# Patient Record
Sex: FEMALE | Race: WHITE | NOT HISPANIC OR LATINO | URBAN - METROPOLITAN AREA
[De-identification: names, ages, dates, MRNs, and addresses within clinical notes are randomized per-mention and may not be internally consistent; named-entity substitution may affect disease eponyms.]

---

## 2017-03-27 ENCOUNTER — OUTPATIENT (OUTPATIENT)
Dept: OUTPATIENT SERVICES | Facility: HOSPITAL | Age: 82
LOS: 1 days | End: 2017-03-27
Payer: MEDICARE

## 2017-03-27 ENCOUNTER — APPOINTMENT (OUTPATIENT)
Dept: CT IMAGING | Facility: CLINIC | Age: 82
End: 2017-03-27

## 2017-03-27 DIAGNOSIS — Z00.8 ENCOUNTER FOR OTHER GENERAL EXAMINATION: ICD-10-CM

## 2017-03-27 PROCEDURE — 71250 CT THORAX DX C-: CPT

## 2018-05-21 ENCOUNTER — EMERGENCY (EMERGENCY)
Facility: HOSPITAL | Age: 83
LOS: 1 days | Discharge: HOME | End: 2018-05-21
Attending: EMERGENCY MEDICINE
Payer: MEDICARE

## 2018-05-21 VITALS
RESPIRATION RATE: 17 BRPM | TEMPERATURE: 98 F | DIASTOLIC BLOOD PRESSURE: 68 MMHG | SYSTOLIC BLOOD PRESSURE: 120 MMHG | HEART RATE: 92 BPM | OXYGEN SATURATION: 95 %

## 2018-05-21 VITALS
WEIGHT: 95.02 LBS | TEMPERATURE: 98 F | OXYGEN SATURATION: 94 % | SYSTOLIC BLOOD PRESSURE: 114 MMHG | DIASTOLIC BLOOD PRESSURE: 70 MMHG | HEART RATE: 98 BPM | HEIGHT: 59 IN | RESPIRATION RATE: 20 BRPM

## 2018-05-21 LAB
ALBUMIN SERPL ELPH-MCNC: 3.5 G/DL — SIGNIFICANT CHANGE UP (ref 3.3–5)
ALP SERPL-CCNC: 94 U/L — SIGNIFICANT CHANGE UP (ref 40–120)
ALT FLD-CCNC: 9 U/L — LOW (ref 10–45)
ANION GAP SERPL CALC-SCNC: 11 MMOL/L — SIGNIFICANT CHANGE UP (ref 5–17)
AST SERPL-CCNC: 12 U/L — SIGNIFICANT CHANGE UP (ref 10–40)
BASOPHILS # BLD AUTO: 0 K/UL — SIGNIFICANT CHANGE UP (ref 0–0.2)
BASOPHILS NFR BLD AUTO: 0.3 % — SIGNIFICANT CHANGE UP (ref 0–2)
BILIRUB SERPL-MCNC: 0.3 MG/DL — SIGNIFICANT CHANGE UP (ref 0.2–1.2)
BUN SERPL-MCNC: 18 MG/DL — SIGNIFICANT CHANGE UP (ref 7–23)
CALCIUM SERPL-MCNC: 9.5 MG/DL — SIGNIFICANT CHANGE UP (ref 8.4–10.5)
CHLORIDE SERPL-SCNC: 100 MMOL/L — SIGNIFICANT CHANGE UP (ref 96–108)
CO2 SERPL-SCNC: 27 MMOL/L — SIGNIFICANT CHANGE UP (ref 22–31)
CREAT SERPL-MCNC: 1.09 MG/DL — SIGNIFICANT CHANGE UP (ref 0.5–1.3)
EOSINOPHIL # BLD AUTO: 0.1 K/UL — SIGNIFICANT CHANGE UP (ref 0–0.5)
EOSINOPHIL NFR BLD AUTO: 1.7 % — SIGNIFICANT CHANGE UP (ref 0–6)
GAS PNL BLDV: SIGNIFICANT CHANGE UP
GLUCOSE SERPL-MCNC: 107 MG/DL — HIGH (ref 70–99)
HCT VFR BLD CALC: 41.6 % — SIGNIFICANT CHANGE UP (ref 34.5–45)
HGB BLD-MCNC: 13.1 G/DL — SIGNIFICANT CHANGE UP (ref 11.5–15.5)
LYMPHOCYTES # BLD AUTO: 0.7 K/UL — LOW (ref 1–3.3)
LYMPHOCYTES # BLD AUTO: 8.4 % — LOW (ref 13–44)
MCHC RBC-ENTMCNC: 31.6 GM/DL — LOW (ref 32–36)
MCHC RBC-ENTMCNC: 31.9 PG — SIGNIFICANT CHANGE UP (ref 27–34)
MCV RBC AUTO: 101 FL — HIGH (ref 80–100)
MONOCYTES # BLD AUTO: 0.5 K/UL — SIGNIFICANT CHANGE UP (ref 0–0.9)
MONOCYTES NFR BLD AUTO: 6 % — SIGNIFICANT CHANGE UP (ref 2–14)
NEUTROPHILS # BLD AUTO: 6.6 K/UL — SIGNIFICANT CHANGE UP (ref 1.8–7.4)
NEUTROPHILS NFR BLD AUTO: 83.6 % — HIGH (ref 43–77)
NT-PROBNP SERPL-SCNC: 405 PG/ML — HIGH (ref 0–300)
PLATELET # BLD AUTO: 270 K/UL — SIGNIFICANT CHANGE UP (ref 150–400)
POTASSIUM SERPL-MCNC: 4.5 MMOL/L — SIGNIFICANT CHANGE UP (ref 3.5–5.3)
POTASSIUM SERPL-SCNC: 4.5 MMOL/L — SIGNIFICANT CHANGE UP (ref 3.5–5.3)
PROT SERPL-MCNC: 6.7 G/DL — SIGNIFICANT CHANGE UP (ref 6–8.3)
RBC # BLD: 4.12 M/UL — SIGNIFICANT CHANGE UP (ref 3.8–5.2)
RBC # FLD: 12.7 % — SIGNIFICANT CHANGE UP (ref 10.3–14.5)
SODIUM SERPL-SCNC: 138 MMOL/L — SIGNIFICANT CHANGE UP (ref 135–145)
TROPONIN T SERPL-MCNC: <0.01 NG/ML — SIGNIFICANT CHANGE UP (ref 0–0.06)
WBC # BLD: 7.8 K/UL — SIGNIFICANT CHANGE UP (ref 3.8–10.5)
WBC # FLD AUTO: 7.8 K/UL — SIGNIFICANT CHANGE UP (ref 3.8–10.5)

## 2018-05-21 PROCEDURE — 71046 X-RAY EXAM CHEST 2 VIEWS: CPT | Mod: 26

## 2018-05-21 PROCEDURE — 85027 COMPLETE CBC AUTOMATED: CPT

## 2018-05-21 PROCEDURE — 99283 EMERGENCY DEPT VISIT LOW MDM: CPT

## 2018-05-21 PROCEDURE — 83605 ASSAY OF LACTIC ACID: CPT

## 2018-05-21 PROCEDURE — 82435 ASSAY OF BLOOD CHLORIDE: CPT

## 2018-05-21 PROCEDURE — 82803 BLOOD GASES ANY COMBINATION: CPT

## 2018-05-21 PROCEDURE — 80053 COMPREHEN METABOLIC PANEL: CPT

## 2018-05-21 PROCEDURE — 84132 ASSAY OF SERUM POTASSIUM: CPT

## 2018-05-21 PROCEDURE — 84295 ASSAY OF SERUM SODIUM: CPT

## 2018-05-21 PROCEDURE — 99284 EMERGENCY DEPT VISIT MOD MDM: CPT

## 2018-05-21 PROCEDURE — 84484 ASSAY OF TROPONIN QUANT: CPT

## 2018-05-21 PROCEDURE — 83880 ASSAY OF NATRIURETIC PEPTIDE: CPT

## 2018-05-21 PROCEDURE — 82947 ASSAY GLUCOSE BLOOD QUANT: CPT

## 2018-05-21 PROCEDURE — 82330 ASSAY OF CALCIUM: CPT

## 2018-05-21 PROCEDURE — 85014 HEMATOCRIT: CPT

## 2018-05-21 PROCEDURE — 71046 X-RAY EXAM CHEST 2 VIEWS: CPT

## 2018-05-21 RX ORDER — ONDANSETRON 8 MG/1
4 TABLET, FILM COATED ORAL ONCE
Qty: 0 | Refills: 0 | Status: COMPLETED | OUTPATIENT
Start: 2018-05-21 | End: 2018-05-21

## 2018-05-21 NOTE — ED ADULT TRIAGE NOTE - CHIEF COMPLAINT QUOTE
Patient sent by Dr. Virgilio William (pulmonologist) for shortness of breath and subjective fever at home

## 2018-05-21 NOTE — ED PROVIDER NOTE - ATTENDING CONTRIBUTION TO CARE
I performed a history and physical exam of the patient and discussed their management with the resident and /or advanced care provider. I reviewed the resident and /or ACP's note and agree with the documented findings and plan of care. My medical decison making and observations are found above.  Abd soft, rales/ronchie on rt lungs

## 2018-05-21 NOTE — ED PROVIDER NOTE - PROGRESS NOTE DETAILS
Pt states she feels well and would like to go home. afebrile, cxr does not show consolidation, will have pt follow up with her pulmonologist.

## 2018-05-21 NOTE — ED PROVIDER NOTE - PMH
Adult Hypothyroidism    COPD Exacerbation    Fall  S/P fall with 3 RT rib fractures 12/30/2013  H/O: Lung Cancer    High cholesterol    Peripheral Vascular Disease    Pneumonia  Nov 2013  PVD (peripheral vascular disease)  S/P ;ower ext stents more than 10 years

## 2018-05-21 NOTE — ED ADULT NURSE NOTE - OBJECTIVE STATEMENT
83 yo female with PMH lung CA in remission presents to the ED from home c/o SOB since last night with N/V and fevers. patient states her highest fever of 100. patient is AAOx3. lung sounds clear to the left, rales on the lower right base of lung. patient is 88% RA and placed on 2L NC with an O2 of 100%. patient abdomen is soft, non tender, non-distended. patient denies chest pain, chills, diarrhea, HA, dizziness, cough, dysuria, hematuria, abdominal pain. SEAN. MD at the bedside.

## 2018-05-21 NOTE — ED PROVIDER NOTE - OBJECTIVE STATEMENT
85 yo F hx COPD on advair and spiriva (no home O2), lung cancer s/p L lobectomy, hx recurrent pneumonia (last episode several years ago, no episodes since prevna shot 5 years ago) presenting with 2d sob, nauseous, HA. Pt went to urgent care yesterday with cxr clear, started on amoxicillin, came in today for fever overnight to 100 and episode of vomiting as well as generalized weakness. Walks with walker at baseline, typically does not get SOB with walking but in last few days has been SOB as soon as she stands up.     Pulmonologist Dr. William  PCP Dr. Felipe Layne      hx spine surgery, shoulder replacement, cataracts, PAD w/ stents,   fall 6 weeks ago tripped over chair,pelvic fracture,  s/p physical therapy,  chronic constipation

## 2018-09-30 ENCOUNTER — INPATIENT (INPATIENT)
Facility: HOSPITAL | Age: 83
LOS: 4 days | Discharge: HOME | DRG: 280 | End: 2018-10-05
Attending: INTERNAL MEDICINE | Admitting: INTERNAL MEDICINE
Payer: MEDICARE

## 2018-09-30 VITALS
TEMPERATURE: 99 F | SYSTOLIC BLOOD PRESSURE: 119 MMHG | OXYGEN SATURATION: 90 % | RESPIRATION RATE: 21 BRPM | WEIGHT: 93.92 LBS | DIASTOLIC BLOOD PRESSURE: 72 MMHG | HEART RATE: 101 BPM | HEIGHT: 59 IN

## 2018-09-30 DIAGNOSIS — E03.9 HYPOTHYROIDISM, UNSPECIFIED: ICD-10-CM

## 2018-09-30 DIAGNOSIS — I21.4 NON-ST ELEVATION (NSTEMI) MYOCARDIAL INFARCTION: ICD-10-CM

## 2018-09-30 DIAGNOSIS — J44.9 CHRONIC OBSTRUCTIVE PULMONARY DISEASE, UNSPECIFIED: ICD-10-CM

## 2018-09-30 DIAGNOSIS — I73.9 PERIPHERAL VASCULAR DISEASE, UNSPECIFIED: ICD-10-CM

## 2018-09-30 DIAGNOSIS — R53.1 WEAKNESS: ICD-10-CM

## 2018-09-30 DIAGNOSIS — Z29.9 ENCOUNTER FOR PROPHYLACTIC MEASURES, UNSPECIFIED: ICD-10-CM

## 2018-09-30 DIAGNOSIS — R06.02 SHORTNESS OF BREATH: ICD-10-CM

## 2018-09-30 LAB
ALBUMIN SERPL ELPH-MCNC: 4 G/DL — SIGNIFICANT CHANGE UP (ref 3.3–5)
ALP SERPL-CCNC: 61 U/L — SIGNIFICANT CHANGE UP (ref 40–120)
ALT FLD-CCNC: 14 U/L — SIGNIFICANT CHANGE UP (ref 10–45)
ANION GAP SERPL CALC-SCNC: 14 MMOL/L — SIGNIFICANT CHANGE UP (ref 5–17)
APTT BLD: 29.9 SEC — SIGNIFICANT CHANGE UP (ref 27.5–37.4)
AST SERPL-CCNC: 24 U/L — SIGNIFICANT CHANGE UP (ref 10–40)
BASOPHILS # BLD AUTO: 0 K/UL — SIGNIFICANT CHANGE UP (ref 0–0.2)
BASOPHILS NFR BLD AUTO: 0.3 % — SIGNIFICANT CHANGE UP (ref 0–2)
BILIRUB SERPL-MCNC: 0.3 MG/DL — SIGNIFICANT CHANGE UP (ref 0.2–1.2)
BUN SERPL-MCNC: 25 MG/DL — HIGH (ref 7–23)
CALCIUM SERPL-MCNC: 9.9 MG/DL — SIGNIFICANT CHANGE UP (ref 8.4–10.5)
CHLORIDE SERPL-SCNC: 100 MMOL/L — SIGNIFICANT CHANGE UP (ref 96–108)
CO2 SERPL-SCNC: 25 MMOL/L — SIGNIFICANT CHANGE UP (ref 22–31)
CREAT SERPL-MCNC: 0.97 MG/DL — SIGNIFICANT CHANGE UP (ref 0.5–1.3)
EOSINOPHIL # BLD AUTO: 0 K/UL — SIGNIFICANT CHANGE UP (ref 0–0.5)
EOSINOPHIL NFR BLD AUTO: 0.4 % — SIGNIFICANT CHANGE UP (ref 0–6)
GLUCOSE SERPL-MCNC: 100 MG/DL — HIGH (ref 70–99)
HCT VFR BLD CALC: 45.5 % — HIGH (ref 34.5–45)
HGB BLD-MCNC: 15 G/DL — SIGNIFICANT CHANGE UP (ref 11.5–15.5)
INR BLD: 0.96 RATIO — SIGNIFICANT CHANGE UP (ref 0.88–1.16)
LYMPHOCYTES # BLD AUTO: 1.1 K/UL — SIGNIFICANT CHANGE UP (ref 1–3.3)
LYMPHOCYTES # BLD AUTO: 12.7 % — LOW (ref 13–44)
MCHC RBC-ENTMCNC: 32 PG — SIGNIFICANT CHANGE UP (ref 27–34)
MCHC RBC-ENTMCNC: 32.9 GM/DL — SIGNIFICANT CHANGE UP (ref 32–36)
MCV RBC AUTO: 97.3 FL — SIGNIFICANT CHANGE UP (ref 80–100)
MONOCYTES # BLD AUTO: 0.6 K/UL — SIGNIFICANT CHANGE UP (ref 0–0.9)
MONOCYTES NFR BLD AUTO: 6.6 % — SIGNIFICANT CHANGE UP (ref 2–14)
NEUTROPHILS # BLD AUTO: 7.2 K/UL — SIGNIFICANT CHANGE UP (ref 1.8–7.4)
NEUTROPHILS NFR BLD AUTO: 80.1 % — HIGH (ref 43–77)
PLATELET # BLD AUTO: 287 K/UL — SIGNIFICANT CHANGE UP (ref 150–400)
POTASSIUM SERPL-MCNC: 4.4 MMOL/L — SIGNIFICANT CHANGE UP (ref 3.5–5.3)
POTASSIUM SERPL-SCNC: 4.4 MMOL/L — SIGNIFICANT CHANGE UP (ref 3.5–5.3)
PROT SERPL-MCNC: 7.2 G/DL — SIGNIFICANT CHANGE UP (ref 6–8.3)
PROTHROM AB SERPL-ACNC: 10.5 SEC — SIGNIFICANT CHANGE UP (ref 9.8–12.7)
RAPID RVP RESULT: DETECTED
RBC # BLD: 4.68 M/UL — SIGNIFICANT CHANGE UP (ref 3.8–5.2)
RBC # FLD: 12.9 % — SIGNIFICANT CHANGE UP (ref 10.3–14.5)
RV+EV RNA SPEC QL NAA+PROBE: DETECTED
SODIUM SERPL-SCNC: 139 MMOL/L — SIGNIFICANT CHANGE UP (ref 135–145)
TROPONIN T, HIGH SENSITIVITY RESULT: 100 NG/L — HIGH (ref 0–51)
TROPONIN T, HIGH SENSITIVITY RESULT: 102 NG/L — HIGH (ref 0–51)
WBC # BLD: 9 K/UL — SIGNIFICANT CHANGE UP (ref 3.8–10.5)
WBC # FLD AUTO: 9 K/UL — SIGNIFICANT CHANGE UP (ref 3.8–10.5)

## 2018-09-30 PROCEDURE — 93010 ELECTROCARDIOGRAM REPORT: CPT

## 2018-09-30 PROCEDURE — 99223 1ST HOSP IP/OBS HIGH 75: CPT

## 2018-09-30 PROCEDURE — 99285 EMERGENCY DEPT VISIT HI MDM: CPT | Mod: GC,25

## 2018-09-30 PROCEDURE — 71045 X-RAY EXAM CHEST 1 VIEW: CPT | Mod: 26

## 2018-09-30 PROCEDURE — 99285 EMERGENCY DEPT VISIT HI MDM: CPT | Mod: GC

## 2018-09-30 PROCEDURE — 93306 TTE W/DOPPLER COMPLETE: CPT | Mod: 26

## 2018-09-30 RX ORDER — CLOPIDOGREL BISULFATE 75 MG/1
75 TABLET, FILM COATED ORAL DAILY
Qty: 0 | Refills: 0 | Status: DISCONTINUED | OUTPATIENT
Start: 2018-10-01 | End: 2018-10-03

## 2018-09-30 RX ORDER — INFLUENZA VIRUS VACCINE 15; 15; 15; 15 UG/.5ML; UG/.5ML; UG/.5ML; UG/.5ML
0.5 SUSPENSION INTRAMUSCULAR ONCE
Qty: 0 | Refills: 0 | Status: DISCONTINUED | OUTPATIENT
Start: 2018-09-30 | End: 2018-10-05

## 2018-09-30 RX ORDER — ASPIRIN/CALCIUM CARB/MAGNESIUM 324 MG
324 TABLET ORAL DAILY
Qty: 0 | Refills: 0 | Status: DISCONTINUED | OUTPATIENT
Start: 2018-09-30 | End: 2018-09-30

## 2018-09-30 RX ORDER — CLOPIDOGREL BISULFATE 75 MG/1
300 TABLET, FILM COATED ORAL ONCE
Qty: 0 | Refills: 0 | Status: COMPLETED | OUTPATIENT
Start: 2018-09-30 | End: 2018-09-30

## 2018-09-30 RX ORDER — SENNA PLUS 8.6 MG/1
2 TABLET ORAL AT BEDTIME
Qty: 0 | Refills: 0 | Status: DISCONTINUED | OUTPATIENT
Start: 2018-09-30 | End: 2018-10-05

## 2018-09-30 RX ORDER — HEPARIN SODIUM 5000 [USP'U]/ML
INJECTION INTRAVENOUS; SUBCUTANEOUS
Qty: 25000 | Refills: 0 | Status: DISCONTINUED | OUTPATIENT
Start: 2018-09-30 | End: 2018-10-01

## 2018-09-30 RX ORDER — ACETAMINOPHEN 500 MG
500 TABLET ORAL EVERY 6 HOURS
Qty: 0 | Refills: 0 | Status: DISCONTINUED | OUTPATIENT
Start: 2018-09-30 | End: 2018-10-05

## 2018-09-30 RX ORDER — HEPARIN SODIUM 5000 [USP'U]/ML
2700 INJECTION INTRAVENOUS; SUBCUTANEOUS ONCE
Qty: 0 | Refills: 0 | Status: COMPLETED | OUTPATIENT
Start: 2018-09-30 | End: 2018-09-30

## 2018-09-30 RX ORDER — METOPROLOL TARTRATE 50 MG
12.5 TABLET ORAL EVERY 12 HOURS
Qty: 0 | Refills: 0 | Status: DISCONTINUED | OUTPATIENT
Start: 2018-09-30 | End: 2018-10-02

## 2018-09-30 RX ORDER — ONDANSETRON 8 MG/1
4 TABLET, FILM COATED ORAL ONCE
Qty: 0 | Refills: 0 | Status: COMPLETED | OUTPATIENT
Start: 2018-09-30 | End: 2018-10-01

## 2018-09-30 RX ORDER — ASPIRIN/CALCIUM CARB/MAGNESIUM 324 MG
81 TABLET ORAL DAILY
Qty: 0 | Refills: 0 | Status: DISCONTINUED | OUTPATIENT
Start: 2018-10-01 | End: 2018-10-05

## 2018-09-30 RX ORDER — HEPARIN SODIUM 5000 [USP'U]/ML
2700 INJECTION INTRAVENOUS; SUBCUTANEOUS EVERY 6 HOURS
Qty: 0 | Refills: 0 | Status: DISCONTINUED | OUTPATIENT
Start: 2018-09-30 | End: 2018-10-03

## 2018-09-30 RX ORDER — BUDESONIDE AND FORMOTEROL FUMARATE DIHYDRATE 160; 4.5 UG/1; UG/1
2 AEROSOL RESPIRATORY (INHALATION)
Qty: 0 | Refills: 0 | Status: DISCONTINUED | OUTPATIENT
Start: 2018-09-30 | End: 2018-10-05

## 2018-09-30 RX ORDER — ATORVASTATIN CALCIUM 80 MG/1
20 TABLET, FILM COATED ORAL AT BEDTIME
Qty: 0 | Refills: 0 | Status: DISCONTINUED | OUTPATIENT
Start: 2018-09-30 | End: 2018-10-03

## 2018-09-30 RX ORDER — LEVOTHYROXINE SODIUM 125 MCG
75 TABLET ORAL DAILY
Qty: 0 | Refills: 0 | Status: DISCONTINUED | OUTPATIENT
Start: 2018-09-30 | End: 2018-10-05

## 2018-09-30 RX ADMIN — ATORVASTATIN CALCIUM 20 MILLIGRAM(S): 80 TABLET, FILM COATED ORAL at 20:59

## 2018-09-30 RX ADMIN — BUDESONIDE AND FORMOTEROL FUMARATE DIHYDRATE 2 PUFF(S): 160; 4.5 AEROSOL RESPIRATORY (INHALATION) at 20:58

## 2018-09-30 RX ADMIN — Medication 12.5 MILLIGRAM(S): at 21:00

## 2018-09-30 RX ADMIN — SENNA PLUS 2 TABLET(S): 8.6 TABLET ORAL at 20:59

## 2018-09-30 RX ADMIN — HEPARIN SODIUM 500 UNIT(S)/HR: 5000 INJECTION INTRAVENOUS; SUBCUTANEOUS at 17:54

## 2018-09-30 RX ADMIN — HEPARIN SODIUM 2700 UNIT(S): 5000 INJECTION INTRAVENOUS; SUBCUTANEOUS at 17:53

## 2018-09-30 RX ADMIN — CLOPIDOGREL BISULFATE 300 MILLIGRAM(S): 75 TABLET, FILM COATED ORAL at 12:15

## 2018-09-30 RX ADMIN — Medication 324 MILLIGRAM(S): at 12:15

## 2018-09-30 NOTE — H&P ADULT - NSHPLABSRESULTS_GEN_ALL_CORE
< from: TTE with Doppler (w/Cont) (09.30.18 @ 11:26) >    Conclusions:  1. Mitral annular calcification, otherwise normal mitral  valve. Minimal mitral regurgitation.  2. Aortic valve not well visualized; appears calcified.  Minimal aortic regurgitation.  3. Endocardial visualization enhanced with intravenous  injection of Ultrasonic Enhancing Agent (Definity).  Moderate segmental left ventricular systolic dysfunction.  EF approximately 40%. The mid inferolateral, mid lateral,  mid anterior, distal segments and the apical cap are  akinetic. The basal segments are hyperkinetic. No left  ventricular thrombus.  4. Mild diastolic dysfunction (Stage I).  5. The right ventricle is not well visualized; grossly  normal right ventricular size and systolic function.  6. Right pleural effusion.    < end of copied text >    < from: Xray Chest 1 View AP/PA (09.30.18 @ 13:05) >    IMPRESSION:     Clear lungs.    < end of copied text >    EKG: NSR@97, QTc 482, JASMIN in V2

## 2018-09-30 NOTE — H&P ADULT - PROBLEM SELECTOR PLAN 2
likely with URI last week.  Currently afebrile, normal wbc, non toxic, cxr clear without evidence of pna.  Hold abx at this time, if spikes fever low threshold to resume.  May also be component of late presentation MI.  Plan per problem 1  after cath will benefit from PT eval

## 2018-09-30 NOTE — H&P ADULT - COMMENTS
Gen: +NS/chills.  Decreased po intake  ENT: no dysphagia/odynophagia  Vision: no vision changes  CV: no CP/palpitations. +orthopnea chronic since lobectomy  Resp: +cough productive of yellow phlegm.  +dyspnea  GI: +n/v.  No diarrhea.  +constipation  : no dysuria  MSK: +muscle aches from coughing  Psych: no anxiety/depression  Neuro: no syncope/HA  Skin: no rash 2L

## 2018-09-30 NOTE — ED PROVIDER NOTE - PHYSICAL EXAMINATION
Resident:   Gen: elderly, frail, NAD  Head: NC, AT  ENT: PERRL, MMM, no uvular deviation, no tonsilar erythema  Neck: supple with full ROM   Chest: coarse BS on right, no retractions, rate normal, appears to breathe comfortably  Heart: RRR S1S2, No peripheral edema, bilateral pulses in arms and legs  Abd: Soft non-tender, no rebound or guarding  Back: No spinal deformity, no CVAT  Ext: Moving all 4 extremities without obvious impairment to ROM, no obvious weakness  Neuro: fluid speech  Psych: No anxiety, depression or pressured speech noted  Skin: no urticaria, no diffuse rash

## 2018-09-30 NOTE — CONSULT NOTE ADULT - ASSESSMENT
84F w/ pmhx noted below presenting with cough, low grade fevers and weakness for the past week. She had been seen by her PCP Dr. Negrete who has been prescribing her PO antibiotics. Despite the medication she has continued to feel weak w/ continued cough and shortness of breath. Denies any chest pain, pressure or palpitations. Consult called for ST elevation in V2.     #ST elevation - does not clearly meet STEMI criteria as her EKG does not have elevation in contiguous leads. She also, clinically, denies chest pain or pressure. Remains hemodynamically stable  - will get stat ECHO  - agree w/ ACS protocol medications for now  - f/u hST    DW Dr. Rebeca Hodges MD  Cardiology Fellow 84F w/ pmhx noted below presenting with cough, low grade fevers and weakness for the past week. She had been seen by her PCP Dr. Negrete who has been prescribing her PO antibiotics. Despite the medication she has continued to feel weak w/ continued cough and shortness of breath. Denies any chest pain, pressure or palpitations. Consult called for ST elevation in V2.     #ST elevation - does not clearly meet STEMI criteria as her EKG does not have elevation in contiguous leads. She also, clinically, denies chest pain or pressure. Remains hemodynamically stable  - will get stat ECHO  - agree w/ ACS protocol medications for now  - f/u hST    DW Dr. Rebeca Hodges MD  Cardiology Fellow    Addendum: Reviewed echocardiogram with attending. Patient does have anterior wall motion abnormalities and a moderately reduced EF. Full read to follow shortly. After reassessing the patient is possible that amidst the viral illness she had a MI and/or the symptoms she had been feeling were 2/2 to the MI and were mistaken for a viral illness. She has no chest pain at this time and appears comfortable. Cardiology service to follow and to arrange for cardiac cath this week. Admit to angel.     KIT Hodges MD  Cardiology Fellow 84F w/ pmhx noted below presenting with cough, low grade fevers and weakness for the past week. She had been seen by her PCP Dr. Negrete who has been prescribing her PO antibiotics. Despite the medication she has continued to feel weak w/ continued cough and shortness of breath. Denies any chest pain, pressure or palpitations. Consult called for ST elevation in V2.     #ST elevation - does not clearly meet STEMI criteria as her EKG does not have elevation in contiguous leads. She also, clinically, denies chest pain or pressure. Remains hemodynamically stable  - will get stat ECHO  - agree w/ ACS protocol medications for now  - f/u hST    DW Dr. Rebeca Hodges MD  Cardiology Fellow    Addendum: Reviewed echocardiogram with attending. Patient does have anterior wall motion abnormalities and a moderately reduced EF. Full read to follow shortly. After reassessing the patient is possible that amidst the viral illness she had a MI and/or the symptoms she had been feeling were 2/2 to the MI and were mistaken for a viral illness. She has no chest pain at this time and appears comfortable. Cardiology service to follow and to arrange for cardiac cath this week. Admit to tele.     KIRBY Hodges MD  Cardiology Fellow 84F w/ pmhx noted below presenting with cough, low grade fevers and weakness for the past week. She had been seen by her PCP Dr. Negrete who has been prescribing her PO antibiotics. Despite the medication she has continued to feel weak w/ continued cough and shortness of breath. Denies any chest pain, pressure or palpitations. Consult called for ST elevation in V2.     #ST elevation - does not clearly meet STEMI criteria as her EKG does not have elevation in contiguous leads. She also, clinically, denies chest pain or pressure. Remains hemodynamically stable  - will get stat ECHO  - agree w/ ACS protocol medications for now  - f/u hST    DW Dr. Rebeca Hodges MD  Cardiology Fellow    Addendum: Reviewed echocardiogram with attending. Patient does have anterior wall motion abnormalities and a moderately reduced EF. Full read to follow shortly. After reassessing the patient is possible that amidst the viral illness she had a MI and/or the symptoms she had been feeling were 2/2 to the MI and were mistaken for a viral illness. She has no chest pain at this time and appears comfortable. Cardiology service to follow and to arrange for cardiac cath this week. Medical management for now. Admit to tele.     DW Dr. Rebeca Hodges MD  Cardiology Fellow 84F w/ pmhx noted below presenting with cough, low grade fevers and weakness for the past week. She had been seen by her PCP Dr. Negrete who has been prescribing her PO antibiotics. Despite the medication she has continued to feel weak w/ continued cough and shortness of breath. Denies any chest pain, pressure or palpitations. Consult called for ST elevation in V2.     #ST elevation - does not clearly meet STEMI criteria as her EKG does not have elevation in contiguous leads. She also, clinically, denies chest pain or pressure. Remains hemodynamically stable  - will get stat ECHO  - agree w/ ACS protocol medications for now  - f/u hST    DW Dr. Rebeca Hodges MD  Cardiology Fellow    Addendum: Reviewed echocardiogram with attending. Patient does have anterior wall motion abnormalities and a moderately reduced EF. Full read to follow shortly. After reassessing the patient it is possible that amidst the viral illness she had an atypical presentation for MI, however, differential includes possible myopericarditis and stress induced cardiomyopathy. She has no chest pain at this time and appears comfortable. Cardiology service to follow and to arrange for cardiac cath this week. Medical management for now. Admit to tele. Trend CE's. Repeat EKG in AM.     KIRBY Hodges MD  Cardiology Fellow 84F w/ pmhx noted below presenting with cough, low grade fevers and weakness for the past week. She had been seen by her PCP Dr. Negrete who has been prescribing her PO antibiotics. Despite the medication she has continued to feel weak w/ continued cough and shortness of breath. Denies any chest pain, pressure or palpitations. Consult called for ST elevation in V2.     #ST elevation - does not clearly meet STEMI criteria as her EKG does not have elevation in contiguous leads. She also, clinically, denies chest pain or pressure. Remains hemodynamically stable  - will get stat ECHO  - agree w/ ACS protocol medications for now  - f/u hST    DW Dr. Rebeca Hodges MD  Cardiology Fellow    Addendum: Reviewed echocardiogram with attending. Patient does have anterior wall motion abnormalities and a moderately reduced EF. Full read to follow shortly. After reassessing the patient it is possible that amidst the viral illness she had an atypical presentation for MI, however, differential includes possible myopericarditis and stress induced cardiomyopathy. She has no chest pain at this time and appears comfortable. There is acute requirement for cardiac cath at this time. Cardiology service to follow and to arrange for cath this week. Medical management for now. Admit to tele. Trend CE's. Repeat EKG in AM.     KIRBY Hodges MD  Cardiology Fellow

## 2018-09-30 NOTE — H&P ADULT - RS GEN PE MLT RESP DETAILS PC
breath sounds equal/respirations non-labored/no intercostal retractions/no rhonchi/no wheezes/clear to auscultation bilaterally/normal/good air movement

## 2018-09-30 NOTE — ED ADULT NURSE NOTE - NSIMPLEMENTINTERV_GEN_ALL_ED
Implemented All Fall Risk Interventions:  [place] to call system. Call bell, personal items and telephone within reach. Instruct patient to call for assistance. Room bathroom lighting operational. Non-slip footwear when patient is off stretcher. Physically safe environment: no spills, clutter or unnecessary equipment. Stretcher in lowest position, wheels locked, appropriate side rails in place. Provide visual cue, wrist band, yellow gown, etc. Monitor gait and stability. Monitor for mental status changes and reorient to person, place, and time. Review medications for side effects contributing to fall risk. Reinforce activity limits and safety measures with patient and family.

## 2018-09-30 NOTE — H&P ADULT - ASSESSMENT
85yo F pmh COPD, lung cancer s/p lobectomy 13 years ago, PVD, hypothyroid, spinal stenosis recent URI treated with abx p/w fatigue, found to have elevated troponin, JASMIN in 1 lead, and TTE with wall motion abnormalities, concerning for late presentation MI vs NSTEMI vs myocarditis, on heparin gtt/ACS protocol plan for cath this week by cardiology.

## 2018-09-30 NOTE — H&P ADULT - HISTORY OF PRESENT ILLNESS
85yo F pmh COPD, lung cancer s/p lobectomy 13 years ago, PVD, hypothyroid, spinal stenosis presents feeling like she's 'been hit by a truck.'  She was in her usual state of health until 1 week PTA when she noted URI symptoms of cough/sneeze/dyspnea.  She went to an urgent care, was swabbed for flu which was negative, told she did not have pna, and was given zpac which she finished on Thursday PTA.  She felt no improvement and went to her PCP who prescribed doxycycline.  Since starting the abx, she has c/o daily nausea/vomiting.  Also reports night sweats/chills.  She denies CP/palpitations but feels overwhelmingly weak.    In the ED, EKG was concerning for JASMIN in 1 lead (not contiguous) and subsequent TTE with wall motion abnormalities: asa 324mg, plavix 300mg x 2, heparin gtt ACS protocol.

## 2018-09-30 NOTE — H&P ADULT - PROBLEM SELECTOR PLAN 1
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  Per d/w Dr Holcomb will trial lipitor 20mg  oxgyen as needed to maintain O2>92%  monitor on tele  cath planning per cardiology given wall motion abnormalities noted on TTE

## 2018-09-30 NOTE — CONSULT NOTE ADULT - SUBJECTIVE AND OBJECTIVE BOX
Registration Time:  Called by ED:  Saw patient at bedside:  Called Cath Attending:    Patient seen and evaluated at bedside    Chief Complaint:    HPI:      PMH:   PVD (peripheral vascular disease)  Fall  Pneumonia  High cholesterol  Peripheral Vascular Disease  Adult Hypothyroidism  COPD Exacerbation  H/O: Lung Cancer      PSH:   S/P Laminectomy with Spinal Fusion  S/P Shoulder Surgery  S/P Lobectomy of Lung      Medications:   aspirin  chewable 324 milliGRAM(s) Oral daily  clopidogrel Tablet 300 milliGRAM(s) Oral once      Allergies:  Levaquin (Unknown)  predniSONE (Unknown)  tetanus immune globulin (Rash)  tetnus (Rash)      FAMILY HISTORY:      Social History:  Smoking History:  Alcohol Use:  Drug Use:    Review of Systems:  REVIEW OF SYSTEMS:  CONSTITUTIONAL: No weakness, fevers or chills  EYES/ENT: No visual changes;  No dysphagia  NECK: No pain or stiffness  RESPIRATORY: No cough, wheezing, hemoptysis; No shortness of breath  CARDIOVASCULAR: No chest pain or palpitations; No lower extremity edema  GASTROINTESTINAL: No abdominal or epigastric pain. No nausea, vomiting, or hematemesis; No diarrhea or constipation. No melena or hematochezia.  BACK: No back pain  GENITOURINARY: No dysuria, frequency or hematuria  NEUROLOGICAL: No numbness or weakness  SKIN: No itching, burning, rashes, or lesions   All other review of systems is negative unless indicated above.    Physical Exam:  T(F): 98.9 (09-30), Max: 98.9 (09-30)  HR: 101 (09-30) (101 - 101)  BP: 119/72 (09-30) (119/72 - 119/72)  RR: 21 (09-30)  SpO2: 90% (09-30)  GENERAL: No acute distress, well-developed  HEAD:  Atraumatic, Normocephalic  ENT: EOMI, PERRLA, conjunctiva and sclera clear, Neck supple, No JVD, moist mucosa  CHEST/LUNG: Clear to auscultation bilaterally; No wheeze, equal breath sounds bilaterally   BACK: No spinal tenderness  HEART: Regular rate and rhythm; No murmurs, rubs, or gallops  ABDOMEN: Soft, Nontender, Nondistended; Bowel sounds present  EXTREMITIES:  No clubbing, cyanosis, or edema  PSYCH: Nl behavior, nl affect  NEUROLOGY: AAOx3, non-focal, cranial nerves intact  SKIN: Normal color, No rashes or lesions  LINES:    Cardiovascular Diagnostic Testing:    ECG: Personally reviewed    Echo:    Stress Testing:    Cath:    Interpretation of Telemetry:    Imaging:    Labs: Personally reviewed Registration Time: 10:15  Called by ED: 10:50  Saw patient at bedside:10:44  Called Cath Attendin:15    Patient seen and evaluated at bedside    Chief Complaint: Lethargy, weakness    HPI: 84F w/ pmhx noted below presenting with cough, low grade fevers and weakness for the past week. She had been seen by her PCP Dr. Negrete who has been prescribing her PO antibiotics. Despite the medication she has continued to feel weak w/ continued cough and shortness of breath. Denies any chest pain, pressure or palpitations. Consult called for ST elevation in V2.       PMH:   PVD (peripheral vascular disease)  Fall  Pneumonia  High cholesterol  Peripheral Vascular Disease  Adult Hypothyroidism  COPD Exacerbation  H/O: Lung Cancer      PSH:   S/P Laminectomy with Spinal Fusion  S/P Shoulder Surgery  S/P Lobectomy of Lung      Medications:   aspirin  chewable 324 milliGRAM(s) Oral daily  clopidogrel Tablet 300 milliGRAM(s) Oral once      Allergies:  Levaquin (Unknown)  predniSONE (Unknown)  tetanus immune globulin (Rash)  tetnus (Rash)      FAMILY HISTORY: unable to obtain      Social History:  Smoking History: prior history  Alcohol Use: social  Drug Use: no    Review of Systems:  REVIEW OF SYSTEMS:  CONSTITUTIONAL: No weakness, fevers or chills  EYES/ENT: No visual changes;  No dysphagia  NECK: No pain or stiffness  RESPIRATORY: No cough, wheezing, hemoptysis; No shortness of breath  CARDIOVASCULAR: No chest pain or palpitations; No lower extremity edema  GASTROINTESTINAL: No abdominal or epigastric pain. No nausea, vomiting, or hematemesis; No diarrhea or constipation. No melena or hematochezia.  BACK: No back pain  GENITOURINARY: No dysuria, frequency or hematuria  NEUROLOGICAL: No numbness or weakness  SKIN: No itching, burning, rashes, or lesions   All other review of systems is negative unless indicated above.    Physical Exam:  T(F): 98.9 (-), Max: 98.9 ()  HR: 101 () (101 - 101)  BP: 119/72 (-) (119/72 - 119/72)  RR: 21 (-)  SpO2: 90% ()  GENERAL: No acute distress, well-developed  HEAD:  Atraumatic, Normocephalic  ENT: EOMI, PERRLA, conjunctiva and sclera clear, Neck supple, No JVD, moist mucosa  CHEST/LUNG: Clear to auscultation bilaterally; No wheeze, equal breath sounds bilaterally   BACK: No spinal tenderness  HEART: Regular rate and rhythm; No murmurs, rubs, or gallops  ABDOMEN: Soft, Nontender, Nondistended; Bowel sounds present  EXTREMITIES:  No clubbing, cyanosis, or edema  PSYCH: Nl behavior, nl affect  NEUROLOGY: AAOx3, non-focal, cranial nerves intact  SKIN: Normal color, No rashes or lesions  LINES:    Cardiovascular Diagnostic Testing:    ECG: Personally reviewed    Echo:    Stress Testing:    Cath:    Interpretation of Telemetry:    Imaging:    Labs: Personally reviewed Registration Time: 10:15  Called by ED: 10:50  Saw patient at bedside:10:44  Called Cath Attendin:15    Patient seen and evaluated at bedside    Chief Complaint: Lethargy, weakness    HPI: 84F w/ pmhx noted below presenting with cough, low grade fevers and weakness for the past week. She had been seen by her PCP Dr. Negrete who has been prescribing her PO antibiotics. Despite the medication she has continued to feel weak w/ continued cough and shortness of breath. Denies any chest pain, pressure or palpitations. Consult called for ST elevation in V2.       PMH:   PVD (peripheral vascular disease)  Fall  Pneumonia  High cholesterol  Peripheral Vascular Disease  Adult Hypothyroidism  COPD Exacerbation  H/O: Lung Cancer      PSH:   S/P Laminectomy with Spinal Fusion  S/P Shoulder Surgery  S/P Lobectomy of Lung      Medications:   aspirin  chewable 324 milliGRAM(s) Oral daily  clopidogrel Tablet 300 milliGRAM(s) Oral once      Allergies:  Levaquin (Unknown)  predniSONE (Unknown)  tetanus immune globulin (Rash)  tetnus (Rash)      FAMILY HISTORY: unable to obtain      Social History:  Smoking History: prior history  Alcohol Use: social  Drug Use: no    Review of Systems:  REVIEW OF SYSTEMS:  CONSTITUTIONAL: No weakness, fevers or chills  EYES/ENT: No visual changes;  No dysphagia  NECK: No pain or stiffness  RESPIRATORY: No cough, wheezing, hemoptysis; No shortness of breath  CARDIOVASCULAR: No chest pain or palpitations; No lower extremity edema  GASTROINTESTINAL: No abdominal or epigastric pain. No nausea, vomiting, or hematemesis; No diarrhea or constipation. No melena or hematochezia.  BACK: No back pain  GENITOURINARY: No dysuria, frequency or hematuria  NEUROLOGICAL: No numbness or weakness  SKIN: No itching, burning, rashes, or lesions   All other review of systems is negative unless indicated above.    Physical Exam:  T(F): 98.9 (-), Max: 98.9 ()  HR: 101 () (101 - 101)  BP: 119/72 (-) (119/72 - 119/72)  RR: 21 (-)  SpO2: 90% ()  GENERAL: No acute distress, well-developed  HEAD:  Atraumatic, Normocephalic  ENT: EOMI, PERRLA, conjunctiva and sclera clear, Neck supple, No JVD, moist mucosa  CHEST/LUNG: Clear to auscultation bilaterally; No wheeze, equal breath sounds bilaterally   BACK: No spinal tenderness  HEART: Regular rate and rhythm; No murmurs, rubs, or gallops  ABDOMEN: Soft, Nontender, Nondistended; Bowel sounds present  EXTREMITIES:  No clubbing, cyanosis, or edema  PSYCH: Nl behavior, nl affect  NEUROLOGY: AAOx3, non-focal, cranial nerves intact  SKIN: Normal color, No rashes or lesions  LINES:    Cardiovascular Diagnostic Testing:    ECG: Personally reviewed        Labs: Personally reviewed

## 2018-09-30 NOTE — ED ADULT NURSE NOTE - OBJECTIVE STATEMENT
84 year old female presents to ED via wheel chair through waiting room from home with  and son complaining of shortness of breath & cough x 1 week. History of PVD, PNA, HLD, hypothyroid, COPD, lung ca 12 years ago. States she went to an urgent care a week ago and was told she had a URI and given z-pack. followed up with PMD who placed her on doxy. States she still has cough, SOB, sneezing despite abx. Denies HA, CP, abd pain, NVD, chills, body aches, dysuria, hematuria. 18g PIV placed in R AC. Lives home with , ambulates with walker at baseline, had single mechanical fall a few months ago. Patient undressed and placed into gown, call bell in hand and side rails up with bed in lowest position for safety. blanket provided. Comfort and safety provided.

## 2018-09-30 NOTE — ED PROVIDER NOTE - PROGRESS NOTE DETAILS
Resident: EKG concerning for STEMI, cards called, dual antiplatelet therapy ordered. Chika: cards recommend echo. TROP is 100. cards aware. Diya: echo shows anterior wall defect. no cath emergently. will admit for further eval and likely cath tomorrow.

## 2018-09-30 NOTE — ED PROVIDER NOTE - MEDICAL DECISION MAKING DETAILS
Resident: concern for pna. labs, cxr, ekg, reassess. Resident: concern for pna. labs, cxr, ekg, reassess.  Diya: Patient with sob, runny nose, worsening cough, here for r/o pna. ekg concerning for STEMI. cards consulted immediately. recommend echo at this time. no emergent cath. will get labs, cardiac enzymes, cxr, admit.

## 2018-09-30 NOTE — ED PROVIDER NOTE - OBJECTIVE STATEMENT
Resident: 84y F PMH hypothyroidism presents with worsening shortness of breath x 1 wk. Complains of cough, runny nose, sneezing x 1 wk Resident: 84y F PM hypothyroidism presents with worsening shortness of breath x 1 wk. Complains of cough, runny nose, sneezing x 1 wk, symptoms did not improve with z-apk, started on doxycycline, sent in by PMD with concerns for pna.    PMD Virgilio Paniagua

## 2018-10-01 DIAGNOSIS — J44.9 CHRONIC OBSTRUCTIVE PULMONARY DISEASE, UNSPECIFIED: ICD-10-CM

## 2018-10-01 DIAGNOSIS — J06.9 ACUTE UPPER RESPIRATORY INFECTION, UNSPECIFIED: ICD-10-CM

## 2018-10-01 LAB
ANION GAP SERPL CALC-SCNC: 9 MMOL/L — SIGNIFICANT CHANGE UP (ref 5–17)
APTT BLD: 42.8 SEC — HIGH (ref 27.5–37.4)
APTT BLD: 49.8 SEC — HIGH (ref 27.5–37.4)
APTT BLD: 49.9 SEC — HIGH (ref 27.5–37.4)
APTT BLD: 60.9 SEC — HIGH (ref 27.5–37.4)
BASOPHILS # BLD AUTO: 0.05 K/UL — SIGNIFICANT CHANGE UP (ref 0–0.2)
BASOPHILS NFR BLD AUTO: 0.5 % — SIGNIFICANT CHANGE UP (ref 0–2)
BUN SERPL-MCNC: 26 MG/DL — HIGH (ref 7–23)
CALCIUM SERPL-MCNC: 9.3 MG/DL — SIGNIFICANT CHANGE UP (ref 8.4–10.5)
CHLORIDE SERPL-SCNC: 103 MMOL/L — SIGNIFICANT CHANGE UP (ref 96–108)
CK MB CFR SERPL CALC: 5 NG/ML — HIGH (ref 0–3.8)
CK MB CFR SERPL CALC: 5.5 NG/ML — HIGH (ref 0–3.8)
CK SERPL-CCNC: 171 U/L — HIGH (ref 25–170)
CO2 SERPL-SCNC: 27 MMOL/L — SIGNIFICANT CHANGE UP (ref 22–31)
CREAT SERPL-MCNC: 0.98 MG/DL — SIGNIFICANT CHANGE UP (ref 0.5–1.3)
EOSINOPHIL # BLD AUTO: 0.02 K/UL — SIGNIFICANT CHANGE UP (ref 0–0.5)
EOSINOPHIL NFR BLD AUTO: 0.2 % — SIGNIFICANT CHANGE UP (ref 0–6)
GLUCOSE SERPL-MCNC: 103 MG/DL — HIGH (ref 70–99)
HCT VFR BLD CALC: 40.2 % — SIGNIFICANT CHANGE UP (ref 34.5–45)
HCT VFR BLD CALC: 41.2 % — SIGNIFICANT CHANGE UP (ref 34.5–45)
HCT VFR BLD CALC: 41.5 % — SIGNIFICANT CHANGE UP (ref 34.5–45)
HGB BLD-MCNC: 13 G/DL — SIGNIFICANT CHANGE UP (ref 11.5–15.5)
HGB BLD-MCNC: 13.2 G/DL — SIGNIFICANT CHANGE UP (ref 11.5–15.5)
HGB BLD-MCNC: 13.6 G/DL — SIGNIFICANT CHANGE UP (ref 11.5–15.5)
IMM GRANULOCYTES NFR BLD AUTO: 0.8 % — SIGNIFICANT CHANGE UP (ref 0–1.5)
LYMPHOCYTES # BLD AUTO: 1.27 K/UL — SIGNIFICANT CHANGE UP (ref 1–3.3)
LYMPHOCYTES # BLD AUTO: 12.8 % — LOW (ref 13–44)
MAGNESIUM SERPL-MCNC: 1.5 MG/DL — LOW (ref 1.6–2.6)
MCHC RBC-ENTMCNC: 30.7 PG — SIGNIFICANT CHANGE UP (ref 27–34)
MCHC RBC-ENTMCNC: 31.3 GM/DL — LOW (ref 32–36)
MCHC RBC-ENTMCNC: 32 PG — SIGNIFICANT CHANGE UP (ref 27–34)
MCHC RBC-ENTMCNC: 32.1 PG — SIGNIFICANT CHANGE UP (ref 27–34)
MCHC RBC-ENTMCNC: 32.9 GM/DL — SIGNIFICANT CHANGE UP (ref 32–36)
MCHC RBC-ENTMCNC: 33.1 GM/DL — SIGNIFICANT CHANGE UP (ref 32–36)
MCV RBC AUTO: 96.7 FL — SIGNIFICANT CHANGE UP (ref 80–100)
MCV RBC AUTO: 97.4 FL — SIGNIFICANT CHANGE UP (ref 80–100)
MCV RBC AUTO: 97.9 FL — SIGNIFICANT CHANGE UP (ref 80–100)
MONOCYTES # BLD AUTO: 0.97 K/UL — HIGH (ref 0–0.9)
MONOCYTES NFR BLD AUTO: 9.7 % — SIGNIFICANT CHANGE UP (ref 2–14)
NEUTROPHILS # BLD AUTO: 7.56 K/UL — HIGH (ref 1.8–7.4)
NEUTROPHILS NFR BLD AUTO: 76 % — SIGNIFICANT CHANGE UP (ref 43–77)
PHOSPHATE SERPL-MCNC: 3.6 MG/DL — SIGNIFICANT CHANGE UP (ref 2.5–4.5)
PLATELET # BLD AUTO: 276 K/UL — SIGNIFICANT CHANGE UP (ref 150–400)
PLATELET # BLD AUTO: 279 K/UL — SIGNIFICANT CHANGE UP (ref 150–400)
PLATELET # BLD AUTO: 295 K/UL — SIGNIFICANT CHANGE UP (ref 150–400)
POTASSIUM SERPL-MCNC: 4.3 MMOL/L — SIGNIFICANT CHANGE UP (ref 3.5–5.3)
POTASSIUM SERPL-SCNC: 4.3 MMOL/L — SIGNIFICANT CHANGE UP (ref 3.5–5.3)
RBC # BLD: 4.13 M/UL — SIGNIFICANT CHANGE UP (ref 3.8–5.2)
RBC # BLD: 4.24 M/UL — SIGNIFICANT CHANGE UP (ref 3.8–5.2)
RBC # BLD: 4.26 M/UL — SIGNIFICANT CHANGE UP (ref 3.8–5.2)
RBC # FLD: 12.4 % — SIGNIFICANT CHANGE UP (ref 10.3–14.5)
RBC # FLD: 12.8 % — SIGNIFICANT CHANGE UP (ref 10.3–14.5)
RBC # FLD: 13.9 % — SIGNIFICANT CHANGE UP (ref 10.3–14.5)
SODIUM SERPL-SCNC: 139 MMOL/L — SIGNIFICANT CHANGE UP (ref 135–145)
TROPONIN T, HIGH SENSITIVITY RESULT: 88 NG/L — HIGH (ref 0–51)
TROPONIN T, HIGH SENSITIVITY RESULT: 94 NG/L — HIGH (ref 0–51)
TSH SERPL-MCNC: 10.29 UIU/ML — HIGH (ref 0.27–4.2)
WBC # BLD: 9.2 K/UL — SIGNIFICANT CHANGE UP (ref 3.8–10.5)
WBC # BLD: 9.3 K/UL — SIGNIFICANT CHANGE UP (ref 3.8–10.5)
WBC # BLD: 9.95 K/UL — SIGNIFICANT CHANGE UP (ref 3.8–10.5)
WBC # FLD AUTO: 9.2 K/UL — SIGNIFICANT CHANGE UP (ref 3.8–10.5)
WBC # FLD AUTO: 9.3 K/UL — SIGNIFICANT CHANGE UP (ref 3.8–10.5)
WBC # FLD AUTO: 9.95 K/UL — SIGNIFICANT CHANGE UP (ref 3.8–10.5)

## 2018-10-01 PROCEDURE — 99233 SBSQ HOSP IP/OBS HIGH 50: CPT

## 2018-10-01 RX ORDER — HEPARIN SODIUM 5000 [USP'U]/ML
600 INJECTION INTRAVENOUS; SUBCUTANEOUS
Qty: 25000 | Refills: 0 | Status: DISCONTINUED | OUTPATIENT
Start: 2018-10-01 | End: 2018-10-03

## 2018-10-01 RX ORDER — ONDANSETRON 8 MG/1
4 TABLET, FILM COATED ORAL ONCE
Qty: 0 | Refills: 0 | Status: COMPLETED | OUTPATIENT
Start: 2018-10-01 | End: 2018-10-01

## 2018-10-01 RX ORDER — IPRATROPIUM/ALBUTEROL SULFATE 18-103MCG
3 AEROSOL WITH ADAPTER (GRAM) INHALATION EVERY 6 HOURS
Qty: 0 | Refills: 0 | Status: DISCONTINUED | OUTPATIENT
Start: 2018-10-01 | End: 2018-10-05

## 2018-10-01 RX ORDER — MAGNESIUM SULFATE 500 MG/ML
2 VIAL (ML) INJECTION ONCE
Qty: 0 | Refills: 0 | Status: COMPLETED | OUTPATIENT
Start: 2018-10-01 | End: 2018-10-01

## 2018-10-01 RX ORDER — SODIUM CHLORIDE 9 MG/ML
1000 INJECTION INTRAMUSCULAR; INTRAVENOUS; SUBCUTANEOUS
Qty: 0 | Refills: 0 | Status: DISCONTINUED | OUTPATIENT
Start: 2018-10-01 | End: 2018-10-05

## 2018-10-01 RX ORDER — ONDANSETRON 8 MG/1
4 TABLET, FILM COATED ORAL EVERY 6 HOURS
Qty: 0 | Refills: 0 | Status: DISCONTINUED | OUTPATIENT
Start: 2018-10-01 | End: 2018-10-05

## 2018-10-01 RX ORDER — PANTOPRAZOLE SODIUM 20 MG/1
40 TABLET, DELAYED RELEASE ORAL ONCE
Qty: 0 | Refills: 0 | Status: COMPLETED | OUTPATIENT
Start: 2018-10-01 | End: 2018-10-01

## 2018-10-01 RX ADMIN — BUDESONIDE AND FORMOTEROL FUMARATE DIHYDRATE 2 PUFF(S): 160; 4.5 AEROSOL RESPIRATORY (INHALATION) at 17:25

## 2018-10-01 RX ADMIN — ONDANSETRON 4 MILLIGRAM(S): 8 TABLET, FILM COATED ORAL at 01:21

## 2018-10-01 RX ADMIN — Medication 1 DROP(S): at 11:10

## 2018-10-01 RX ADMIN — HEPARIN SODIUM 600 UNIT(S)/HR: 5000 INJECTION INTRAVENOUS; SUBCUTANEOUS at 01:26

## 2018-10-01 RX ADMIN — Medication 1200 MILLIGRAM(S): at 17:25

## 2018-10-01 RX ADMIN — Medication 50 GRAM(S): at 15:41

## 2018-10-01 RX ADMIN — ONDANSETRON 4 MILLIGRAM(S): 8 TABLET, FILM COATED ORAL at 12:31

## 2018-10-01 RX ADMIN — Medication 100 MILLIGRAM(S): at 01:21

## 2018-10-01 RX ADMIN — Medication 75 MICROGRAM(S): at 05:10

## 2018-10-01 RX ADMIN — BUDESONIDE AND FORMOTEROL FUMARATE DIHYDRATE 2 PUFF(S): 160; 4.5 AEROSOL RESPIRATORY (INHALATION) at 05:10

## 2018-10-01 RX ADMIN — ATORVASTATIN CALCIUM 20 MILLIGRAM(S): 80 TABLET, FILM COATED ORAL at 21:55

## 2018-10-01 RX ADMIN — Medication 12.5 MILLIGRAM(S): at 05:10

## 2018-10-01 RX ADMIN — HEPARIN SODIUM 800 UNIT(S)/HR: 5000 INJECTION INTRAVENOUS; SUBCUTANEOUS at 22:25

## 2018-10-01 RX ADMIN — ONDANSETRON 4 MILLIGRAM(S): 8 TABLET, FILM COATED ORAL at 15:42

## 2018-10-01 RX ADMIN — SENNA PLUS 2 TABLET(S): 8.6 TABLET ORAL at 21:55

## 2018-10-01 RX ADMIN — CLOPIDOGREL BISULFATE 75 MILLIGRAM(S): 75 TABLET, FILM COATED ORAL at 11:10

## 2018-10-01 RX ADMIN — Medication 81 MILLIGRAM(S): at 11:10

## 2018-10-01 RX ADMIN — SODIUM CHLORIDE 50 MILLILITER(S): 9 INJECTION INTRAMUSCULAR; INTRAVENOUS; SUBCUTANEOUS at 06:44

## 2018-10-01 RX ADMIN — Medication 3 MILLILITER(S): at 17:24

## 2018-10-01 RX ADMIN — HEPARIN SODIUM 700 UNIT(S)/HR: 5000 INJECTION INTRAVENOUS; SUBCUTANEOUS at 09:07

## 2018-10-01 RX ADMIN — HEPARIN SODIUM 700 UNIT(S)/HR: 5000 INJECTION INTRAVENOUS; SUBCUTANEOUS at 15:42

## 2018-10-01 RX ADMIN — Medication 12.5 MILLIGRAM(S): at 17:24

## 2018-10-01 RX ADMIN — PANTOPRAZOLE SODIUM 40 MILLIGRAM(S): 20 TABLET, DELAYED RELEASE ORAL at 06:44

## 2018-10-01 RX ADMIN — Medication 3 MILLILITER(S): at 23:20

## 2018-10-01 NOTE — PROVIDER CONTACT NOTE (OTHER) - ASSESSMENT
Upon assessment, patient is resting comfortably in bed, NAD, with no complaints at this time.  Patient denies presence of chest pain, palpitations, lightheadness/dizziness, dyspnea at this time.

## 2018-10-01 NOTE — CONSULT NOTE ADULT - ASSESSMENT
85 y/o F with PMH of COPD, lung cancer s/p lobectomy 13 years ago, PVD, hypothyroid, spinal stenosis presents feeling like she's 'been hit by a truck.'  She was in her usual state of health until 1 week PTA when she noted URI symptoms of cough/sneeze/dyspnea.  She went to an urgent care, was swabbed for flu which was negative, given 1 week of Azithromycin which she finished on Thursday PTA.  She felt no improvement and went to her PCP who prescribed doxycycline. She woke up on Sunday feeling as if she had been "hit by a truck" and came to ER. RVP +rhino-enterovirus. EKG changes and elevated troponin-concern for ACS.

## 2018-10-01 NOTE — CONSULT NOTE ADULT - ASSESSMENT
85yo F pmh COPD, lung cancer s/p lobectomy 13 years ago, PVD, hypothyroid, spinal stenosis recent URI treated with abx p/w fatigue, found to have elevated troponin, JASMIN in 1 lead, and TTE with wall motion abnormalities, concerning for late presentation MI vs NSTEMI vs myocarditis, on heparin gtt/ACS protocol plan for cath this week by cardiology.      NSTEMI (non-ST elevated myocardial infarction).    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  Per d/w Dr Holcomb will trial lipitor 20mg  - oxgyen as needed to maintain O2>92%  - monitor on tele  - cath planning per cardiology given wall motion abnormalities noted on TTE.      Weaknes secondary to URI    -Currently afebrile, normal wbc, non toxic, cxr clear without evidence of pna.  Hold abx at this time,    Chronic obstructive pulmonary disease   - no  acute exacerbation  - continue home advair equivalent.     PVD (peripheral vascular disease).    - no home meds.  Has h/o stents 2004    Hypothyroidism,  - check tsh  - continue home synthroid.     DVT px  - on hep gtt acs protocol 83yo F pmh COPD, lung cancer s/p lobectomy 13 years ago, PVD, hypothyroid, spinal stenosis recent URI treated with abx p/w fatigue, found to have elevated troponin, JASMIN in 1 lead, and TTE with wall motion abnormalities, concerning for late presentation MI vs NSTEMI vs myocarditis, on heparin gtt/ACS protocol plan for cath this week by cardiology.      NSTEMI (non-ST elevated myocardial infarction).    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  Per d/w Dr Holcomb will trial lipitor 20mg  - oxgyen as needed to maintain O2>92%  - monitor on tele  - cath planning per cardiology given wall motion abnormalities noted on TTE.      Weaknes secondary to URI    -Currently afebrile, normal wbc, non toxic, cxr clear without evidence of pna.  Hold abx at this time,    Chronic obstructive pulmonary disease   - no  acute exacerbation  - continue home advair equivalent.   - pt is known to dr simons    PVD (peripheral vascular disease).    - no home meds.  Has h/o stents 2004    Hypothyroidism,  - check tsh  - continue home synthroid.     DVT px  - on hep gtt acs protocol

## 2018-10-01 NOTE — PROVIDER CONTACT NOTE (OTHER) - BACKGROUND
Patient admitted for shortness of breath.  PMH of PVD, fall, pneumonia, high cholesterol, PVD, adult hypothyroidism, COPD exacerbation.

## 2018-10-01 NOTE — CHART NOTE - NSCHARTNOTEFT_GEN_A_CORE
HPI: Notified by RN that patient c/o nausea. Seen and examined the patient at bedside. Patient c/o being nausea for a week since started to have symptoms of URI. Patient expresses that she is not drinking enough water due to nausea and c/o cough and feeling weak. Denies fever, chills, chest pain, SOB, abdominal pain, constipation, and dysuria.    CC" nausea, poor oral intake"    Vital Signs Last 24 Hrs  T(C): 36.6 (01 Oct 2018 04:11), Max: 37.2 (30 Sep 2018 10:27)  T(F): 97.9 (01 Oct 2018 04:11), Max: 99 (30 Sep 2018 19:17)  HR: 84 (01 Oct 2018 04:11) (84 - 101)  BP: 105/65 (01 Oct 2018 04:11) (105/65 - 154/85)  BP(mean): 78 (01 Oct 2018 04:11) (78 - 103)  RR: 18 (01 Oct 2018 04:11) (16 - 21)  SpO2: 96% (01 Oct 2018 04:11) (90% - 98%)      Labs:                          13.6   9.2   )-----------( 279      ( 30 Sep 2018 23:53 )             41.2     09-30    139  |  100  |  25<H>  ----------------------------<  100<H>  4.4   |  25  |  0.97    Ca    9.9      30 Sep 2018 12:06    TPro  7.2  /  Alb  4.0  /  TBili  0.3  /  DBili  x   /  AST  24  /  ALT  14  /  AlkPhos  61  09-30    Radiology:< from: Xray Chest 1 View AP/PA (09.30.18 @ 13:05) >    MEDICATIONS  (STANDING):  artificial tears (preservative free) Ophthalmic Solution 1 Drop(s) Both EYES daily  aspirin enteric coated 81 milliGRAM(s) Oral daily  atorvastatin 20 milliGRAM(s) Oral at bedtime  buDESOnide 160 MICROgram(s)/formoterol 4.5 MICROgram(s) Inhaler 2 Puff(s) Inhalation two times a day  clopidogrel Tablet 75 milliGRAM(s) Oral daily  heparin  Infusion. 600 Unit(s)/Hr (6 mL/Hr) IV Continuous <Continuous>  influenza   Vaccine 0.5 milliLiter(s) IntraMuscular once  levothyroxine 75 MICROGram(s) Oral daily  metoprolol tartrate 12.5 milliGRAM(s) Oral every 12 hours  pantoprazole  Injectable 40 milliGRAM(s) IV Push once  senna 2 Tablet(s) Oral at bedtime  sodium chloride 0.9%. 1000 milliLiter(s) (50 mL/Hr) IV Continuous <Continuous>    MEDICATIONS  (PRN):  acetaminophen   Tablet .. 500 milliGRAM(s) Oral every 6 hours PRN Temp greater or equal to 38C (100.4F), Moderate Pain (4 - 6)  heparin  Injectable 2700 Unit(s) IV Push every 6 hours PRN For aPTT less than 40    Patient History:    Past Medical History:  Adult Hypothyroidism    COPD Exacerbation    Fall  S/P fall with 3 RT rib fractures 12/30/2013  H/O: Lung Cancer    High cholesterol    Peripheral Vascular Disease    Pneumonia  Nov 2013  PVD (peripheral vascular disease)  S/P ;ower ext stents more than 10 years.     Past Surgical History:  S/P Laminectomy with Spinal Fusion    S/P Lobectomy of Lung    S/P Shoulder Surgery.    Gen:  Decreased po intake  ENT: no dysphagia/odynophagia  Vision: no vision changes  CV: no CP/palpitations. +orthopnea chronic since lobectomy  Resp: +cough productive of yellow phlegm.  +dyspnea  GI: +n/v.  No diarrhea.  +constipation  : no dysuria  MSK: +muscle aches from coughing  Psych: no anxiety/depression  Neuro: no syncope/HA    Physical Exam:  General: Lying in bed, alert, fatigued  Neurology: A&Ox3, nonfocal, TURNER x 4  Respiratory: CTA B/L  CV: RRR, S1S2, no murmur  Abdominal: Soft, NT, ND no palpable mass  MSK: No edema, + peripheral pulses, FROM all 4 extremity    Assessment & Plan:    85yo F pmh COPD, lung cancer s/p lobectomy 13 years ago, PVD, hypothyroid, spinal stenosis recent URI treated with abx p/w fatigue, found to have elevated troponin, JASMIN in 1 lead, and TTE with wall motion abnormalities, concerning for late presentation MI vs NSTEMI vs myocarditis, on heparin gtt/ACS protocol plan for cath this week by cardiology. patient now with c/o feeling weak, nausea, poor oral intake, RVP sent overnight came back positive for Entero/Rhino virus.     URI /viral syndrome/RVP positive for Enterovirus: patient with no leukocytosis, CXR clear, no fever  Zofran 4 mg IVP received for nausea  Gentle hydration with Iv fluid Ns @ 50ml/hr started for 12 hours (EF 40%)  Protonix 40mg IV one dose now  Continue heparin gtt/aspirin/statin  Will follow closely  Will update with primary team    Time spent with the patient: 25 minutes    Asa Comer P BC  [id number]

## 2018-10-01 NOTE — CONSULT NOTE ADULT - SUBJECTIVE AND OBJECTIVE BOX
CARDIOLOGY CONSULT - Dr. Holcomb     CHIEF COMPLAINT: NSTEMI     HPI:  85yo F pmh COPD, lung cancer s/p lobectomy 13 years ago, PVD, hypothyroid, spinal stenosis presents with upper respiratory infection found to have elevated troponins, JASMIN in V2 on EKG, and echo revealing moderate segmental LV systolic dysfunction, +motion wall abnormalities, mild diastolic dysfunction, EF 40%. Pt. states she has been experiencing sudden nausea that wakes her up from her sleep for the past week and overall weakness. Pt. cardiologist is Dr. Negrete, he recently saw him in the office one week ago and was empirically treted with antibiotics for URI , poss pna. Pt. denies cp/palafox, back pain, abd pain, States she is SOB and chronically sleeps with two pillow every since her lobectomy. Also reports night sweats and chills.   In the ED, EKG was concerning for JASMIN in 1 lead (not contiguous) and subsequent TTE with wall motion abnormalities: asa 324mg, plavix 300mg x 2, heparin gtt ACS protocol.     PAST MEDICAL & SURGICAL HISTORY:  PVD (peripheral vascular disease): S/P ;ower ext stents more than 10 years  Fall: S/P fall with 3 RT rib fractures 12/30/2013  Pneumonia: Nov 2013  High cholesterol  Peripheral Vascular Disease  Adult Hypothyroidism  COPD Exacerbation  H/O: Lung Cancer  S/P Laminectomy with Spinal Fusion  S/P Shoulder Surgery  S/P Lobectomy of Lung          PREVIOUS DIAGNOSTIC TESTING:    [ ] Echocardiogram: < from: TTE with Doppler (w/Cont) (09.30.18 @ 11:26) >    Conclusions:  1. Mitral annular calcification, otherwise normal mitral  valve. Minimal mitral regurgitation.  2. Aortic valve not well visualized; appears calcified.  Minimal aortic regurgitation.  3. Endocardial visualization enhanced with intravenous  injection of Ultrasonic Enhancing Agent (Definity).  Moderate segmental left ventricular systolic dysfunction.  EF approximately 40%. The mid inferolateral, mid lateral,  mid anterior, distal segments and the apical cap are  akinetic. The basal segments are hyperkinetic. No left  ventricular thrombus.  4. Mild diastolic dysfunction (Stage I).  5. The right ventricle is not well visualized; grossly  normal right ventricular size and systolic function.  6. Right pleural effusion.  Results discussed with cardiology fellow, Dr. Hodges.  *** Compared with echocardiogram of 11/14/2013, moderate LV  dysfunction with new wall motion abnormalities are seen on  today's study.    < end of copied text >    [ ]  Catheterization:   [ ] Stress Test:     MEDICATIONS:  MEDICATIONS  (STANDING):  artificial tears (preservative free) Ophthalmic Solution 1 Drop(s) Both EYES daily  aspirin enteric coated 81 milliGRAM(s) Oral daily  atorvastatin 20 milliGRAM(s) Oral at bedtime  buDESOnide 160 MICROgram(s)/formoterol 4.5 MICROgram(s) Inhaler 2 Puff(s) Inhalation two times a day  clopidogrel Tablet 75 milliGRAM(s) Oral daily  heparin  Infusion. 600 Unit(s)/Hr (6 mL/Hr) IV Continuous <Continuous>  influenza   Vaccine 0.5 milliLiter(s) IntraMuscular once  levothyroxine 75 MICROGram(s) Oral daily  metoprolol tartrate 12.5 milliGRAM(s) Oral every 12 hours  senna 2 Tablet(s) Oral at bedtime  sodium chloride 0.9%. 1000 milliLiter(s) (50 mL/Hr) IV Continuous <Continuous>      FAMILY HISTORY:  No pertinent family history in first degree relatives      SOCIAL HISTORY:    [ ] Non-smoker  [x ] former Smoker  [ ] Alcohol    Allergies    Levaquin (Unknown)  predniSONE (Unknown)  tetanus immune globulin (Rash)  tetnus (Rash)    Intolerances    	    REVIEW OF SYSTEMS:  CONSTITUTIONAL: + fever, weight loss, + fatigue  EYES: No eye pain, visual disturbances, or discharge  ENMT:  No difficulty hearing, tinnitus, vertigo; No sinus or throat pain  NECK: No pain or stiffness  RESPIRATORY: + cough, wheezing, chills or hemoptysis; , + Shortness of Breath  CARDIOVASCULAR: No chest pain, palpitations, passing out, dizziness, or leg swelling  GASTROINTESTINAL: No abdominal or epigastric pain. No nausea, vomiting, or hematemesis; No diarrhea or constipation. No melena or hematochezia.  GENITOURINARY: No dysuria, frequency, hematuria, or incontinence  NEUROLOGICAL: No headaches, memory loss, loss of strength, numbness, or tremors  SKIN: No itching, burning, rashes, or lesions   	    [x] All others negative	  [ ] Unable to obtain    PHYSICAL EXAM:  T(C): 36.6 (10-01-18 @ 04:11), Max: 37.2 (09-30-18 @ 19:17)  HR: 84 (10-01-18 @ 04:11) (84 - 99)  BP: 105/65 (10-01-18 @ 04:11) (105/65 - 154/85)  RR: 18 (10-01-18 @ 04:11) (16 - 18)  SpO2: 96% (10-01-18 @ 04:11) (93% - 98%)  Wt(kg): --  I&O's Summary    30 Sep 2018 07:01  -  01 Oct 2018 07:00  --------------------------------------------------------  IN: 350 mL / OUT: 1400 mL / NET: -1050 mL    01 Oct 2018 07:01  -  01 Oct 2018 11:02  --------------------------------------------------------  IN: 220 mL / OUT: 100 mL / NET: 120 mL        Appearance: Normal	  Psychiatry: A & O x 3, Mood & affect appropriate  HEENT:   Normal oral mucosa, PERRL, EOMI	  Lymphatic: No lymphadenopathy  Cardiovascular: Normal S1 S2,RRR, No JVD, No murmurs  Respiratory: Lungs clear to auscultation	  Gastrointestinal:  Soft, Non-tender, + BS	  Skin: No rashes, No ecchymoses, No cyanosis	  Neurologic: Non-focal  Extremities: Normal range of motion, No clubbing, cyanosis or edema  Vascular: Peripheral pulses palpable 2+ bilaterally    TELEMETRY: NSR 70-80s	    ECG:  	NSR, 97, JASMIN in V2 ( not contiguous)  RADIOLOGY:   OTHER: 	  	< from: Xray Chest 1 View AP/PA (09.30.18 @ 13:05) >    IMPRESSION:     Clear lungs.    < end of copied text >    LABS:	 	    CARDIAC MARKERS:    HST - 100, 102                                13.2   9.3   )-----------( 276      ( 01 Oct 2018 08:00 )             40.2     10-01    139  |  103  |  26<H>  ----------------------------<  103<H>  4.3   |  27  |  0.98    Ca    9.3      01 Oct 2018 06:01  Phos  3.6     10-01  Mg     1.5     10-01    TPro  7.2  /  Alb  4.0  /  TBili  0.3  /  DBili  x   /  AST  24  /  ALT  14  /  AlkPhos  61  09-30    PT/INR - ( 30 Sep 2018 12:06 )   PT: 10.5 sec;   INR: 0.96 ratio         PTT - ( 01 Oct 2018 08:00 )  PTT:49.8 sec  proBNP:   Lipid Profile:   HgA1c:   TSH: Thyroid Stimulating Hormone, Serum: 10.29 uIU/mL (10-01 @ 07:54)

## 2018-10-01 NOTE — CONSULT NOTE ADULT - SUBJECTIVE AND OBJECTIVE BOX
83yo F pmh COPD, lung cancer s/p lobectomy 13 years ago, PVD, hypothyroid, spinal stenosis presents feeling like she's 'been hit by a truck.'  She was in her usual state of health until 1 week PTA when she noted URI symptoms of cough/sneeze/dyspnea.  She went to an urgent care, was swabbed for flu which was negative, told she did not have pna, and was given zpac which she finished on Thursday PTA.  She felt no improvement and went to her PCP who prescribed doxycycline.  Since starting the abx, she has c/o daily nausea/vomiting.  Also reports night sweats/chills.  She denies CP/palpitations but feels overwhelmingly weak.    In the ED, EKG was concerning for JASMIN in 1 lead (not contiguous) and subsequent TTE with wall motion abnormalities: asa 324mg, plavix 300mg x 2, heparin gtt ACS protocol. (30 Sep 2018 19:17)      PAST MEDICAL & SURGICAL HISTORY:  PVD (peripheral vascular disease): S/P ;ower ext stents more than 10 years  Fall: S/P fall with 3 RT rib fractures 12/30/2013  Pneumonia: Nov 2013  High cholesterol  Peripheral Vascular Disease  Adult Hypothyroidism  COPD Exacerbation  H/O: Lung Cancer  S/P Laminectomy with Spinal Fusion  S/P Shoulder Surgery  S/P Lobectomy of Lung      Review of Systems:   CONSTITUTIONAL: No fever, weight loss, or fatigue  EYES: No eye pain, visual disturbances, or discharge  ENMT:  No difficulty hearing, tinnitus, vertigo; No sinus or throat pain  NECK: No pain or stiffness  BREASTS: No pain, masses, or nipple discharge  RESPIRATORY: No cough, wheezing, chills or hemoptysis; No shortness of breath  CARDIOVASCULAR: No chest pain, palpitations, dizziness, or leg swelling  GASTROINTESTINAL: No abdominal or epigastric pain. No nausea, vomiting, or hematemesis; No diarrhea or constipation. No melena or hematochezia.  GENITOURINARY: No dysuria, frequency, hematuria, or incontinence  NEUROLOGICAL: No headaches, memory loss, loss of strength, numbness, or tremors  SKIN: No itching, burning, rashes, or lesions   LYMPH NODES: No enlarged glands  ENDOCRINE: No heat or cold intolerance; No hair loss  MUSCULOSKELETAL: No joint pain or swelling; No muscle, back, or extremity pain  PSYCHIATRIC: No depression, anxiety, mood swings, or difficulty sleeping  HEME/LYMPH: No easy bruising, or bleeding gums  ALLERY AND IMMUNOLOGIC: No hives or eczema    Allergies    Levaquin (Unknown)  predniSONE (Unknown)  tetanus immune globulin (Rash)  tetnus (Rash)    Intolerances        Social History:     FAMILY HISTORY:  No pertinent family history in first degree relatives      MEDICATIONS  (STANDING):  artificial tears (preservative free) Ophthalmic Solution 1 Drop(s) Both EYES daily  aspirin enteric coated 81 milliGRAM(s) Oral daily  atorvastatin 20 milliGRAM(s) Oral at bedtime  buDESOnide 160 MICROgram(s)/formoterol 4.5 MICROgram(s) Inhaler 2 Puff(s) Inhalation two times a day  clopidogrel Tablet 75 milliGRAM(s) Oral daily  heparin  Infusion. 600 Unit(s)/Hr (6 mL/Hr) IV Continuous <Continuous>  influenza   Vaccine 0.5 milliLiter(s) IntraMuscular once  levothyroxine 75 MICROGram(s) Oral daily  metoprolol tartrate 12.5 milliGRAM(s) Oral every 12 hours  senna 2 Tablet(s) Oral at bedtime  sodium chloride 0.9%. 1000 milliLiter(s) (50 mL/Hr) IV Continuous <Continuous>    MEDICATIONS  (PRN):  acetaminophen   Tablet .. 500 milliGRAM(s) Oral every 6 hours PRN Temp greater or equal to 38C (100.4F), Moderate Pain (4 - 6)  heparin  Injectable 2700 Unit(s) IV Push every 6 hours PRN For aPTT less than 40      Vital Signs Last 24 Hrs  T(C): 36.6 (01 Oct 2018 04:11), Max: 37.2 (30 Sep 2018 10:27)  T(F): 97.9 (01 Oct 2018 04:11), Max: 99 (30 Sep 2018 19:17)  HR: 84 (01 Oct 2018 04:11) (84 - 101)  BP: 105/65 (01 Oct 2018 04:11) (105/65 - 154/85)  BP(mean): 78 (01 Oct 2018 04:11) (78 - 103)  RR: 18 (01 Oct 2018 04:11) (16 - 21)  SpO2: 96% (01 Oct 2018 04:11) (90% - 98%)  CAPILLARY BLOOD GLUCOSE      POCT Blood Glucose.: 103 mg/dL (30 Sep 2018 11:16)    I&O's Summary    30 Sep 2018 07:01  -  01 Oct 2018 07:00  --------------------------------------------------------  IN: 350 mL / OUT: 1400 mL / NET: -1050 mL    01 Oct 2018 07:01  -  01 Oct 2018 10:07  --------------------------------------------------------  IN: 220 mL / OUT: 100 mL / NET: 120 mL        PHYSICAL EXAM:  GENERAL: NAD, well-developed  HEAD:  Atraumatic, Normocephalic  EYES: EOMI, PERRLA, conjunctiva and sclera clear  NECK: Supple, No JVD  CHEST/LUNG: Clear to auscultation bilaterally; No wheeze  HEART: Regular rate and rhythm; No murmurs, rubs, or gallops  ABDOMEN: Soft, Nontender, Nondistended; Bowel sounds present  EXTREMITIES:  2+ Peripheral Pulses, No clubbing, cyanosis, or edema  PSYCH: AAOx3  NEUROLOGY: non focal  skin : noleisions

## 2018-10-01 NOTE — PROVIDER CONTACT NOTE (OTHER) - SITUATION
Received a call from Araceli Hurley in Chem Lab regarding patient's high sensitivity troponin. hsTrop = 88.

## 2018-10-01 NOTE — CONSULT NOTE ADULT - SUBJECTIVE AND OBJECTIVE BOX
PULMONARY CONSULT    HPI: 83 y/o F with PMH of COPD, lung cancer s/p lobectomy 13 years ago, PVD, hypothyroid, spinal stenosis presents feeling like she's 'been hit by a truck.'  She was in her usual state of health until 1 week PTA when she noted URI symptoms of cough/sneeze/dyspnea.  She went to an urgent care, was swabbed for flu which was negative, given 1 week of Azithromycin which she finished on Thursday PTA.  She felt no improvement and went to her PCP who prescribed doxycycline.  Since starting the abx, she has c/o daily nausea/vomiting.  Also reports night sweats/chills.  She denies CP/palpitations but feels overwhelmingly weak. c/o non-productive cough, retching, nausea and 2 episodes of vomiting.         PAST MEDICAL & SURGICAL HISTORY:  PVD (peripheral vascular disease): S/P ;ower ext stents more than 10 years  Fall: S/P fall with 3 RT rib fractures 12/30/2013  Pneumonia: Nov 2013  High cholesterol  Peripheral Vascular Disease  Adult Hypothyroidism  COPD Exacerbation  H/O: Lung Cancer  S/P Laminectomy with Spinal Fusion  S/P Shoulder Surgery  S/P Lobectomy of Lung    Allergies    Levaquin (Unknown)  predniSONE (Unknown)  tetanus immune globulin (Rash)  tetnus (Rash)    Intolerances      FAMILY HISTORY:  No pertinent family history in first degree relatives    Social history: Former smoker     Review of Systems:  CONSTITUTIONAL: No fever, chills, or fatigue  EYES: No eye pain, visual disturbances, or discharge  ENMT:  No difficulty hearing, tinnitus, vertigo; No sinus or throat pain  NECK: No pain or stiffness  RESPIRATORY: Per above  CARDIOVASCULAR: No chest pain, palpitations, dizziness, or leg swelling  GASTROINTESTINAL: No abdominal or epigastric pain. No nausea, vomiting, or hematemesis; No diarrhea or constipation. No melena or hematochezia.  GENITOURINARY: No dysuria, frequency, hematuria, or incontinence  NEUROLOGICAL: No headaches, memory loss, loss of strength, numbness, or tremors  SKIN: No itching, burning, rashes, or lesions   MUSCULOSKELETAL: No joint pain or swelling; No muscle, back, or extremity pain  PSYCHIATRIC: No depression, anxiety, mood swings, or difficulty sleeping      Medications:  MEDICATIONS  (STANDING):  ALBUTerol/ipratropium for Nebulization 3 milliLiter(s) Nebulizer every 6 hours  artificial tears (preservative free) Ophthalmic Solution 1 Drop(s) Both EYES daily  aspirin enteric coated 81 milliGRAM(s) Oral daily  atorvastatin 20 milliGRAM(s) Oral at bedtime  buDESOnide 160 MICROgram(s)/formoterol 4.5 MICROgram(s) Inhaler 2 Puff(s) Inhalation two times a day  clopidogrel Tablet 75 milliGRAM(s) Oral daily  guaiFENesin ER 1200 milliGRAM(s) Oral every 12 hours  heparin  Infusion. 600 Unit(s)/Hr (6 mL/Hr) IV Continuous <Continuous>  influenza   Vaccine 0.5 milliLiter(s) IntraMuscular once  levothyroxine 75 MICROGram(s) Oral daily  metoprolol tartrate 12.5 milliGRAM(s) Oral every 12 hours  ondansetron Injectable 4 milliGRAM(s) IV Push once  senna 2 Tablet(s) Oral at bedtime  sodium chloride 0.9%. 1000 milliLiter(s) (50 mL/Hr) IV Continuous <Continuous>    MEDICATIONS  (PRN):  acetaminophen   Tablet .. 500 milliGRAM(s) Oral every 6 hours PRN Temp greater or equal to 38C (100.4F), Moderate Pain (4 - 6)  heparin  Injectable 2700 Unit(s) IV Push every 6 hours PRN For aPTT less than 40  ondansetron Injectable 4 milliGRAM(s) IV Push every 6 hours PRN Nausea and/or Vomiting            Vital Signs Last 24 Hrs  T(C): 36.5 (01 Oct 2018 12:02), Max: 37.2 (30 Sep 2018 19:17)  T(F): 97.7 (01 Oct 2018 12:02), Max: 99 (30 Sep 2018 19:17)  HR: 81 (01 Oct 2018 12:02) (81 - 99)  BP: 94/60 (01 Oct 2018 12:02) (94/60 - 151/80)  BP(mean): 78 (01 Oct 2018 04:11) (78 - 103)  RR: 18 (01 Oct 2018 12:02) (16 - 18)  SpO2: 98% (01 Oct 2018 12:02) (93% - 98%) on 2L NC            09-30 @ 07:01  -  10-01 @ 07:00  --------------------------------------------------------  IN: 350 mL / OUT: 1400 mL / NET: -1050 mL          LABS:                        13.2   9.3   )-----------( 276      ( 01 Oct 2018 08:00 )             40.2     10-01    139  |  103  |  26<H>  ----------------------------<  103<H>  4.3   |  27  |  0.98    Ca    9.3      01 Oct 2018 06:01  Phos  3.6     10-01  Mg     1.5     10-01    TPro  7.2  /  Alb  4.0  /  TBili  0.3  /  DBili  x   /  AST  24  /  ALT  14  /  AlkPhos  61  09-30      CARDIAC MARKERS ( 01 Oct 2018 06:01 )  x     / x     / x     / x     / 5.5 ng/mL      CAPILLARY BLOOD GLUCOSE      POCT Blood Glucose.: 103 mg/dL (30 Sep 2018 11:16)    PT/INR - ( 30 Sep 2018 12:06 )   PT: 10.5 sec;   INR: 0.96 ratio         PTT - ( 01 Oct 2018 08:00 )  PTT:49.8 sec                  CULTURES: (if applicable)  RVP: +rhino-enterovirus       Physical Examination:    General: No acute distress.      HEENT: Pupils equal, reactive to light.  Symmetric.    PULM: Rhonchi bilaterally     CVS: S1, S2    ABD: Soft, nondistended, nontender, normoactive bowel sounds, no masses    EXT: No edema, nontender    SKIN: Warm and well perfused, no rashes noted.    NEURO: Alert, oriented, interactive, nonfocal    RADIOLOGY REVIEWED  CXR: Elevated L hemidiaphragm   Grossly clear PULMONARY CONSULT    HPI: 83 y/o F with PMH of COPD, lung cancer s/p lobectomy 13 years ago, PVD, hypothyroid, spinal stenosis presents feeling like she's 'been hit by a truck.'  She was in her usual state of health until 1 week PTA when she noted URI symptoms of cough/sneeze/dyspnea.  She went to an urgent care, was swabbed for flu which was negative, given 1 week of Azithromycin which she finished on Thursday PTA.  She felt no improvement and went to her PCP who prescribed doxycycline.  Since starting the abx, she has c/o daily nausea/vomiting.  Also reports night sweats/chills. She denies CP/palpitations but feels overwhelmingly weak. c/o non-productive cough, retching, nausea and 2 episodes of vomiting.     PAST MEDICAL & SURGICAL HISTORY:  PVD (peripheral vascular disease): S/P ext stents more than 10 years  Fall: S/P fall with 3 RT rib fractures 12/30/2013  Pneumonia: Nov 2013  High cholesterol  Peripheral Vascular Disease  Adult Hypothyroidism  COPD Exacerbation  H/O: Lung Cancer  S/P Laminectomy with Spinal Fusion  S/P Shoulder Surgery  S/P Lobectomy of Lung    Allergies    Levaquin (Unknown)  predniSONE (Unknown)  tetanus immune globulin (Rash)  tetnus (Rash)    Intolerances      FAMILY HISTORY:  No pertinent family history in first degree relatives    Social history: Former smoker     Review of Systems:  CONSTITUTIONAL: No fever, chills, or fatigue  EYES: No eye pain, visual disturbances, or discharge  ENMT:  No difficulty hearing, tinnitus, vertigo; No sinus or throat pain  NECK: No pain or stiffness  RESPIRATORY: Per above  CARDIOVASCULAR: No chest pain, palpitations, dizziness, or leg swelling  GASTROINTESTINAL: No abdominal or epigastric pain. No nausea, vomiting, or hematemesis; No diarrhea or constipation. No melena or hematochezia.  GENITOURINARY: No dysuria, frequency, hematuria, or incontinence  NEUROLOGICAL: No headaches, memory loss, loss of strength, numbness, or tremors  SKIN: No itching, burning, rashes, or lesions   MUSCULOSKELETAL: weakness  PSYCHIATRIC: No depression, anxiety, mood swings, or difficulty sleeping      Medications:  MEDICATIONS  (STANDING):  ALBUTerol/ipratropium for Nebulization 3 milliLiter(s) Nebulizer every 6 hours  artificial tears (preservative free) Ophthalmic Solution 1 Drop(s) Both EYES daily  aspirin enteric coated 81 milliGRAM(s) Oral daily  atorvastatin 20 milliGRAM(s) Oral at bedtime  buDESOnide 160 MICROgram(s)/formoterol 4.5 MICROgram(s) Inhaler 2 Puff(s) Inhalation two times a day  clopidogrel Tablet 75 milliGRAM(s) Oral daily  guaiFENesin ER 1200 milliGRAM(s) Oral every 12 hours  heparin  Infusion. 600 Unit(s)/Hr (6 mL/Hr) IV Continuous <Continuous>  influenza   Vaccine 0.5 milliLiter(s) IntraMuscular once  levothyroxine 75 MICROGram(s) Oral daily  metoprolol tartrate 12.5 milliGRAM(s) Oral every 12 hours  ondansetron Injectable 4 milliGRAM(s) IV Push once  senna 2 Tablet(s) Oral at bedtime  sodium chloride 0.9%. 1000 milliLiter(s) (50 mL/Hr) IV Continuous <Continuous>    MEDICATIONS  (PRN):  acetaminophen   Tablet .. 500 milliGRAM(s) Oral every 6 hours PRN Temp greater or equal to 38C (100.4F), Moderate Pain (4 - 6)  heparin  Injectable 2700 Unit(s) IV Push every 6 hours PRN For aPTT less than 40  ondansetron Injectable 4 milliGRAM(s) IV Push every 6 hours PRN Nausea and/or Vomiting    Vital Signs Last 24 Hrs  T(C): 36.5 (01 Oct 2018 12:02), Max: 37.2 (30 Sep 2018 19:17)  T(F): 97.7 (01 Oct 2018 12:02), Max: 99 (30 Sep 2018 19:17)  HR: 81 (01 Oct 2018 12:02) (81 - 99)  BP: 94/60 (01 Oct 2018 12:02) (94/60 - 151/80)  BP(mean): 78 (01 Oct 2018 04:11) (78 - 103)  RR: 18 (01 Oct 2018 12:02) (16 - 18)  SpO2: 98% (01 Oct 2018 12:02) (93% - 98%) on 2L NC    09-30 @ 07:01  -  10-01 @ 07:00  --------------------------------------------------------  IN: 350 mL / OUT: 1400 mL / NET: -1050 mL    LABS:                        13.2   9.3   )-----------( 276      ( 01 Oct 2018 08:00 )             40.2     10-01    139  |  103  |  26<H>  ----------------------------<  103<H>  4.3   |  27  |  0.98    Ca    9.3      01 Oct 2018 06:01  Phos  3.6     10-01  Mg     1.5     10-01    TPro  7.2  /  Alb  4.0  /  TBili  0.3  /  DBili  x   /  AST  24  /  ALT  14  /  AlkPhos  61  09-30      CARDIAC MARKERS ( 01 Oct 2018 06:01 )  x     / x     / x     / x     / 5.5 ng/mL      CAPILLARY BLOOD GLUCOSE      POCT Blood Glucose.: 103 mg/dL (30 Sep 2018 11:16)    PT/INR - ( 30 Sep 2018 12:06 )   PT: 10.5 sec;   INR: 0.96 ratio         PTT - ( 01 Oct 2018 08:00 )  PTT:49.8 sec    CULTURES: (if applicable)  RVP: +rhino-enterovirus       Physical Examination:    General: No acute distress.      HEENT: Pupils equal, reactive to light.  Symmetric.    PULM: Rhonchi bilaterally     CVS: S1, S2    ABD: Soft, nondistended, nontender, normoactive bowel sounds, no masses    EXT: No edema, nontender    SKIN: Warm and well perfused, no rashes noted.    NEURO: Alert, oriented, interactive, nonfocal    RADIOLOGY REVIEWED  CXR: Elevated L hemidiaphragm   Grossly clear

## 2018-10-01 NOTE — CONSULT NOTE ADULT - ASSESSMENT
83yo F pmh COPD, lung cancer s/p lobectomy 13 years ago, PVD, hypothyroid, spinal stenosis recent URI treated with abx p/w fatigue, found to have elevated troponin, JASMIN in 1 lead, and TTE with wall motion abnormalities, concerning for late presentation MI vs NSTEMI vs myocarditis, on heparin gtt/ACS protocol.     1. NSTEMI   atypical presentation, no cp/sob, +weakness   +HST with moderate LV dysfunction and + wall motion abnormalities on EDUARDO   add CK, CKMB   s/p plavix load  continue plavix, asa, statin, bb  continue heparin gtt   will plan for cardiac cath tomorrow am     2. URI infection   stable  continue to observe off abx     3. COPD  stable  continue inhalers     dvt ppx 85yo F pmh COPD, lung cancer s/p lobectomy 13 years ago, PVD, hypothyroid, spinal stenosis recent URI treated with abx p/w fatigue, found to have elevated troponin, JASMIN in 1 lead, and TTE with wall motion abnormalities, concerning for late presentation MI vs NSTEMI vs myocarditis, on heparin gtt/ACS protocol.     1. NSTEMI   atypical presentation, no cp/sob, +weakness   +HST with moderate segmental LV dysfunction and + wall motion abnormalities on EDUARDO   add CK, CKMB   s/p plavix load  continue plavix, asa, statin, bb  continue heparin gtt   will plan for cardiac cath tomorrow am     2. URI infection   stable  continue to observe off abx     3. COPD  stable  continue inhalers     dvt ppx 85yo F pmh COPD, lung cancer s/p lobectomy 13 years ago, PVD, hypothyroid, spinal stenosis recent URI treated with abx p/w fatigue, found to have elevated troponin, JASMIN in 1 lead, and TTE with wall motion abnormalities, concerning for late presentation MI vs NSTEMI vs myocarditis, on heparin gtt/ACS protocol.     1. NSTEMI   atypical presentation, no cp/sob, +weakness   +HST with moderate segmental LV dysfunction and + wall motion abnormalities on EDUARDO   add CK, CKMB   s/p plavix load  continue plavix, asa, statin, bb  continue heparin gtt   will plan for coronary CT tomorrow   will consider cath pending coronary CT results     2. URI infection   stable  continue to observe off abx     3. COPD  stable  continue inhalers     dvt ppx

## 2018-10-01 NOTE — CONSULT NOTE ADULT - PROBLEM SELECTOR RECOMMENDATION 9
Concern for PNA given the duration of her symptoms and her suddenly feeling worse over the weekend with purulent sputum   -CT chest non-contrast   -Check procalcitonin in AM   -Check sputum culture

## 2018-10-02 LAB
ANION GAP SERPL CALC-SCNC: 12 MMOL/L — SIGNIFICANT CHANGE UP (ref 5–17)
APTT BLD: 38.3 SEC — HIGH (ref 27.5–37.4)
APTT BLD: 54.3 SEC — HIGH (ref 27.5–37.4)
APTT BLD: 84.8 SEC — HIGH (ref 27.5–37.4)
BUN SERPL-MCNC: 23 MG/DL — SIGNIFICANT CHANGE UP (ref 7–23)
CALCIUM SERPL-MCNC: 9.1 MG/DL — SIGNIFICANT CHANGE UP (ref 8.4–10.5)
CHLORIDE SERPL-SCNC: 103 MMOL/L — SIGNIFICANT CHANGE UP (ref 96–108)
CK SERPL-CCNC: 182 U/L — HIGH (ref 25–170)
CO2 SERPL-SCNC: 24 MMOL/L — SIGNIFICANT CHANGE UP (ref 22–31)
CREAT SERPL-MCNC: 1.03 MG/DL — SIGNIFICANT CHANGE UP (ref 0.5–1.3)
FOLATE SERPL-MCNC: 14.6 NG/ML — SIGNIFICANT CHANGE UP
GLUCOSE SERPL-MCNC: 116 MG/DL — HIGH (ref 70–99)
GRAM STN FLD: SIGNIFICANT CHANGE UP
HCT VFR BLD CALC: 39.5 % — SIGNIFICANT CHANGE UP (ref 34.5–45)
HGB BLD-MCNC: 12.1 G/DL — SIGNIFICANT CHANGE UP (ref 11.5–15.5)
LEGIONELLA AG UR QL: NEGATIVE — SIGNIFICANT CHANGE UP
MAGNESIUM SERPL-MCNC: 1.8 MG/DL — SIGNIFICANT CHANGE UP (ref 1.6–2.6)
MCHC RBC-ENTMCNC: 30.4 PG — SIGNIFICANT CHANGE UP (ref 27–34)
MCHC RBC-ENTMCNC: 30.6 GM/DL — LOW (ref 32–36)
MCV RBC AUTO: 99.2 FL — SIGNIFICANT CHANGE UP (ref 80–100)
PHOSPHATE SERPL-MCNC: 2.8 MG/DL — SIGNIFICANT CHANGE UP (ref 2.5–4.5)
PLATELET # BLD AUTO: 260 K/UL — SIGNIFICANT CHANGE UP (ref 150–400)
POTASSIUM SERPL-MCNC: 4.4 MMOL/L — SIGNIFICANT CHANGE UP (ref 3.5–5.3)
POTASSIUM SERPL-SCNC: 4.4 MMOL/L — SIGNIFICANT CHANGE UP (ref 3.5–5.3)
PROCALCITONIN SERPL-MCNC: 0.1 NG/ML — SIGNIFICANT CHANGE UP (ref 0.02–0.1)
RBC # BLD: 3.98 M/UL — SIGNIFICANT CHANGE UP (ref 3.8–5.2)
RBC # FLD: 13.9 % — SIGNIFICANT CHANGE UP (ref 10.3–14.5)
SODIUM SERPL-SCNC: 139 MMOL/L — SIGNIFICANT CHANGE UP (ref 135–145)
SPECIMEN SOURCE: SIGNIFICANT CHANGE UP
T3 SERPL-MCNC: 47 NG/DL — LOW (ref 80–200)
T4 AB SER-ACNC: 6.5 UG/DL — SIGNIFICANT CHANGE UP (ref 4.6–12)
TSH SERPL-MCNC: 6.63 UIU/ML — HIGH (ref 0.27–4.2)
TSH SERPL-MCNC: 6.73 UIU/ML — HIGH (ref 0.27–4.2)
VIT B12 SERPL-MCNC: 512 PG/ML — SIGNIFICANT CHANGE UP (ref 232–1245)
WBC # BLD: 11.04 K/UL — HIGH (ref 3.8–10.5)
WBC # FLD AUTO: 11.04 K/UL — HIGH (ref 3.8–10.5)

## 2018-10-02 PROCEDURE — 75574 CT ANGIO HRT W/3D IMAGE: CPT | Mod: 26

## 2018-10-02 PROCEDURE — 93010 ELECTROCARDIOGRAM REPORT: CPT

## 2018-10-02 RX ORDER — METOPROLOL TARTRATE 50 MG
12.5 TABLET ORAL ONCE
Qty: 0 | Refills: 0 | Status: DISCONTINUED | OUTPATIENT
Start: 2018-10-02 | End: 2018-10-02

## 2018-10-02 RX ORDER — METOPROLOL TARTRATE 50 MG
25 TABLET ORAL
Qty: 0 | Refills: 0 | Status: DISCONTINUED | OUTPATIENT
Start: 2018-10-02 | End: 2018-10-05

## 2018-10-02 RX ORDER — SODIUM CHLORIDE 9 MG/ML
250 INJECTION INTRAMUSCULAR; INTRAVENOUS; SUBCUTANEOUS ONCE
Qty: 0 | Refills: 0 | Status: COMPLETED | OUTPATIENT
Start: 2018-10-02 | End: 2018-10-02

## 2018-10-02 RX ORDER — METOPROLOL TARTRATE 50 MG
5 TABLET ORAL ONCE
Qty: 0 | Refills: 0 | Status: COMPLETED | OUTPATIENT
Start: 2018-10-02 | End: 2018-10-03

## 2018-10-02 RX ORDER — METOPROLOL TARTRATE 50 MG
12.5 TABLET ORAL ONCE
Qty: 0 | Refills: 0 | Status: COMPLETED | OUTPATIENT
Start: 2018-10-02 | End: 2018-10-02

## 2018-10-02 RX ORDER — METOPROLOL TARTRATE 50 MG
5 TABLET ORAL ONCE
Qty: 0 | Refills: 0 | Status: COMPLETED | OUTPATIENT
Start: 2018-10-02 | End: 2018-10-02

## 2018-10-02 RX ADMIN — Medication 25 MILLIGRAM(S): at 19:11

## 2018-10-02 RX ADMIN — Medication 75 MICROGRAM(S): at 05:40

## 2018-10-02 RX ADMIN — HEPARIN SODIUM 700 UNIT(S)/HR: 5000 INJECTION INTRAVENOUS; SUBCUTANEOUS at 13:00

## 2018-10-02 RX ADMIN — Medication 3 MILLILITER(S): at 12:00

## 2018-10-02 RX ADMIN — SODIUM CHLORIDE 250 MILLILITER(S): 9 INJECTION INTRAMUSCULAR; INTRAVENOUS; SUBCUTANEOUS at 16:39

## 2018-10-02 RX ADMIN — Medication 3 MILLILITER(S): at 17:19

## 2018-10-02 RX ADMIN — ONDANSETRON 4 MILLIGRAM(S): 8 TABLET, FILM COATED ORAL at 02:58

## 2018-10-02 RX ADMIN — ONDANSETRON 4 MILLIGRAM(S): 8 TABLET, FILM COATED ORAL at 23:36

## 2018-10-02 RX ADMIN — Medication 3 MILLILITER(S): at 05:40

## 2018-10-02 RX ADMIN — HEPARIN SODIUM 2700 UNIT(S): 5000 INJECTION INTRAVENOUS; SUBCUTANEOUS at 19:49

## 2018-10-02 RX ADMIN — BUDESONIDE AND FORMOTEROL FUMARATE DIHYDRATE 2 PUFF(S): 160; 4.5 AEROSOL RESPIRATORY (INHALATION) at 17:19

## 2018-10-02 RX ADMIN — HEPARIN SODIUM 0 UNIT(S)/HR: 5000 INJECTION INTRAVENOUS; SUBCUTANEOUS at 05:40

## 2018-10-02 RX ADMIN — Medication 5 MILLIGRAM(S): at 16:39

## 2018-10-02 RX ADMIN — ATORVASTATIN CALCIUM 20 MILLIGRAM(S): 80 TABLET, FILM COATED ORAL at 21:42

## 2018-10-02 RX ADMIN — Medication 12.5 MILLIGRAM(S): at 16:39

## 2018-10-02 RX ADMIN — BUDESONIDE AND FORMOTEROL FUMARATE DIHYDRATE 2 PUFF(S): 160; 4.5 AEROSOL RESPIRATORY (INHALATION) at 05:40

## 2018-10-02 RX ADMIN — CLOPIDOGREL BISULFATE 75 MILLIGRAM(S): 75 TABLET, FILM COATED ORAL at 12:00

## 2018-10-02 RX ADMIN — HEPARIN SODIUM 700 UNIT(S)/HR: 5000 INJECTION INTRAVENOUS; SUBCUTANEOUS at 06:14

## 2018-10-02 RX ADMIN — Medication 1200 MILLIGRAM(S): at 05:40

## 2018-10-02 RX ADMIN — Medication 1200 MILLIGRAM(S): at 17:20

## 2018-10-02 RX ADMIN — Medication 1 DROP(S): at 21:42

## 2018-10-02 RX ADMIN — HEPARIN SODIUM 850 UNIT(S)/HR: 5000 INJECTION INTRAVENOUS; SUBCUTANEOUS at 19:48

## 2018-10-02 RX ADMIN — Medication 12.5 MILLIGRAM(S): at 10:29

## 2018-10-02 RX ADMIN — SENNA PLUS 2 TABLET(S): 8.6 TABLET ORAL at 21:42

## 2018-10-02 RX ADMIN — Medication 12.5 MILLIGRAM(S): at 05:40

## 2018-10-02 RX ADMIN — Medication 81 MILLIGRAM(S): at 12:00

## 2018-10-02 NOTE — PROGRESS NOTE ADULT - SUBJECTIVE AND OBJECTIVE BOX
Patient is a 84y old  Female who presents with a chief complaint of 'I feel like I was hit by a truck' (02 Oct 2018 10:48)      SUBJECTIVE / OVERNIGHT EVENTS:  coughing.  feels weal    MEDICATIONS  (STANDING):  ALBUTerol/ipratropium for Nebulization 3 milliLiter(s) Nebulizer every 6 hours  artificial tears (preservative free) Ophthalmic Solution 1 Drop(s) Both EYES daily  aspirin enteric coated 81 milliGRAM(s) Oral daily  atorvastatin 20 milliGRAM(s) Oral at bedtime  buDESOnide 160 MICROgram(s)/formoterol 4.5 MICROgram(s) Inhaler 2 Puff(s) Inhalation two times a day  clopidogrel Tablet 75 milliGRAM(s) Oral daily  guaiFENesin ER 1200 milliGRAM(s) Oral every 12 hours  heparin  Infusion. 600 Unit(s)/Hr (6 mL/Hr) IV Continuous <Continuous>  influenza   Vaccine 0.5 milliLiter(s) IntraMuscular once  levothyroxine 75 MICROGram(s) Oral daily  metoprolol tartrate 12.5 milliGRAM(s) Oral every 12 hours  senna 2 Tablet(s) Oral at bedtime  sodium chloride 0.9%. 1000 milliLiter(s) (50 mL/Hr) IV Continuous <Continuous>    MEDICATIONS  (PRN):  acetaminophen   Tablet .. 500 milliGRAM(s) Oral every 6 hours PRN Temp greater or equal to 38C (100.4F), Moderate Pain (4 - 6)  heparin  Injectable 2700 Unit(s) IV Push every 6 hours PRN For aPTT less than 40  ondansetron Injectable 4 milliGRAM(s) IV Push every 6 hours PRN Nausea and/or Vomiting      Vital Signs Last 24 Hrs  T(C): 36.9 (02 Oct 2018 03:58), Max: 36.9 (02 Oct 2018 03:58)  T(F): 98.4 (02 Oct 2018 03:58), Max: 98.4 (02 Oct 2018 03:58)  HR: 80 (02 Oct 2018 10:12) (70 - 93)  BP: 109/62 (02 Oct 2018 10:12) (94/60 - 119/72)  BP(mean): --  RR: 18 (02 Oct 2018 03:58) (18 - 18)  SpO2: 95% (02 Oct 2018 03:58) (95% - 98%)  CAPILLARY BLOOD GLUCOSE        I&O's Summary    01 Oct 2018 07:01  -  02 Oct 2018 07:00  --------------------------------------------------------  IN: 750 mL / OUT: 200 mL / NET: 550 mL    02 Oct 2018 07:01  -  02 Oct 2018 11:36  --------------------------------------------------------  IN: 240 mL / OUT: 200 mL / NET: 40 mL        PHYSICAL EXAM:  GENERAL: NAD, well-developed  HEAD:  Atraumatic, Normocephalic  EYES: EOMI, PERRLA, conjunctiva and sclera clear  NECK: Supple, No JVD  CHEST/LUNG: Clear to auscultation bilaterally; No wheeze  HEART: Regular rate and rhythm; No murmurs, rubs, or gallops  ABDOMEN: Soft, Nontender, Nondistended; Bowel sounds present  EXTREMITIES:  2+ Peripheral Pulses, No clubbing, cyanosis, or edema  PSYCH: AAOx3  NEUROLOGY: non-focal  SKIN: No rashes or lesions    LABS:                        12.1   11.04 )-----------( 260      ( 02 Oct 2018 07:51 )             39.5     10-02    139  |  103  |  23  ----------------------------<  116<H>  4.4   |  24  |  1.03    Ca    9.1      02 Oct 2018 05:12  Phos  2.8     10-02  Mg     1.8     10-02    TPro  7.2  /  Alb  4.0  /  TBili  0.3  /  DBili  x   /  AST  24  /  ALT  14  /  AlkPhos  61  09-30    PT/INR - ( 30 Sep 2018 12:06 )   PT: 10.5 sec;   INR: 0.96 ratio         PTT - ( 02 Oct 2018 05:12 )  PTT:84.8 sec  CARDIAC MARKERS ( 01 Oct 2018 17:23 )  x     / x     / 171 U/L / x     / 5.0 ng/mL  CARDIAC MARKERS ( 01 Oct 2018 06:01 )  x     / x     / x     / x     / 5.5 ng/mL          RADIOLOGY & ADDITIONAL TESTS:    Imaging Personally Reviewed:    Consultant(s) Notes Reviewed:      Care Discussed with Consultants/Other Providers:

## 2018-10-02 NOTE — PROGRESS NOTE ADULT - SUBJECTIVE AND OBJECTIVE BOX
CARDIOLOGY FOLLOW UP - Dr. Holcomb    CC no cp/sob  breathing improved       PHYSICAL EXAM:  T(C): 36.9 (10-02-18 @ 03:58), Max: 36.9 (10-02-18 @ 03:58)  HR: 80 (10-02-18 @ 10:12) (70 - 93)  BP: 109/62 (10-02-18 @ 10:12) (104/54 - 119/72)  RR: 18 (10-02-18 @ 03:58) (18 - 18)  SpO2: 95% (10-02-18 @ 03:58) (95% - 96%)  Wt(kg): --  I&O's Summary    01 Oct 2018 07:01  -  02 Oct 2018 07:00  --------------------------------------------------------  IN: 750 mL / OUT: 200 mL / NET: 550 mL    02 Oct 2018 07:01  -  02 Oct 2018 12:13  --------------------------------------------------------  IN: 240 mL / OUT: 200 mL / NET: 40 mL        Appearance: Normal	  Cardiovascular: Normal S1 S2,RRR, No JVD, No murmurs  Respiratory: Lungs clear to auscultation	  Gastrointestinal:  Soft, Non-tender, + BS	  Extremities: Normal range of motion, No clubbing, cyanosis or edema        MEDICATIONS  (STANDING):  ALBUTerol/ipratropium for Nebulization 3 milliLiter(s) Nebulizer every 6 hours  artificial tears (preservative free) Ophthalmic Solution 1 Drop(s) Both EYES daily  aspirin enteric coated 81 milliGRAM(s) Oral daily  atorvastatin 20 milliGRAM(s) Oral at bedtime  buDESOnide 160 MICROgram(s)/formoterol 4.5 MICROgram(s) Inhaler 2 Puff(s) Inhalation two times a day  clopidogrel Tablet 75 milliGRAM(s) Oral daily  guaiFENesin ER 1200 milliGRAM(s) Oral every 12 hours  heparin  Infusion. 600 Unit(s)/Hr (6 mL/Hr) IV Continuous <Continuous>  influenza   Vaccine 0.5 milliLiter(s) IntraMuscular once  levothyroxine 75 MICROGram(s) Oral daily  metoprolol tartrate 12.5 milliGRAM(s) Oral every 12 hours  senna 2 Tablet(s) Oral at bedtime  sodium chloride 0.9%. 1000 milliLiter(s) (50 mL/Hr) IV Continuous <Continuous>      TELEMETRY: NSR 70-80	    ECG:  	  RADIOLOGY:   DIAGNOSTIC TESTING:  [ ] Echocardiogram:  [ ]  Catheterization:  [ ] Stress Test:    OTHER: 	    LABS:	 	                                12.1   11.04 )-----------( 260      ( 02 Oct 2018 07:51 )             39.5     10-02    139  |  103  |  23  ----------------------------<  116<H>  4.4   |  24  |  1.03    Ca    9.1      02 Oct 2018 05:12  Phos  2.8     10-02  Mg     1.8     10-02      PTT - ( 02 Oct 2018 05:12 )  PTT:84.8 sec

## 2018-10-02 NOTE — PROGRESS NOTE ADULT - SUBJECTIVE AND OBJECTIVE BOX
Follow-up Pulm Progress Note    Mostly non-productive cough  97% on 2L NC  Nausea slightly improved     Medications:  MEDICATIONS  (STANDING):  ALBUTerol/ipratropium for Nebulization 3 milliLiter(s) Nebulizer every 6 hours  artificial tears (preservative free) Ophthalmic Solution 1 Drop(s) Both EYES daily  aspirin enteric coated 81 milliGRAM(s) Oral daily  atorvastatin 20 milliGRAM(s) Oral at bedtime  buDESOnide 160 MICROgram(s)/formoterol 4.5 MICROgram(s) Inhaler 2 Puff(s) Inhalation two times a day  clopidogrel Tablet 75 milliGRAM(s) Oral daily  guaiFENesin ER 1200 milliGRAM(s) Oral every 12 hours  heparin  Infusion. 600 Unit(s)/Hr (6 mL/Hr) IV Continuous <Continuous>  influenza   Vaccine 0.5 milliLiter(s) IntraMuscular once  levothyroxine 75 MICROGram(s) Oral daily  metoprolol tartrate 12.5 milliGRAM(s) Oral every 12 hours  senna 2 Tablet(s) Oral at bedtime  sodium chloride 0.9%. 1000 milliLiter(s) (50 mL/Hr) IV Continuous <Continuous>    MEDICATIONS  (PRN):  acetaminophen   Tablet .. 500 milliGRAM(s) Oral every 6 hours PRN Temp greater or equal to 38C (100.4F), Moderate Pain (4 - 6)  heparin  Injectable 2700 Unit(s) IV Push every 6 hours PRN For aPTT less than 40  ondansetron Injectable 4 milliGRAM(s) IV Push every 6 hours PRN Nausea and/or Vomiting          Vital Signs Last 24 Hrs  T(C): 36.9 (02 Oct 2018 03:58), Max: 36.9 (02 Oct 2018 03:58)  T(F): 98.4 (02 Oct 2018 03:58), Max: 98.4 (02 Oct 2018 03:58)  HR: 80 (02 Oct 2018 10:12) (70 - 93)  BP: 109/62 (02 Oct 2018 10:12) (94/60 - 119/72)  BP(mean): --  RR: 18 (02 Oct 2018 03:58) (18 - 18)  SpO2: 95% (02 Oct 2018 03:58) (95% - 98%) on 2L NC          10-01 @ 07:01  -  10-02 @ 07:00  --------------------------------------------------------  IN: 750 mL / OUT: 200 mL / NET: 550 mL          LABS:                        12.1   11.04 )-----------( 260      ( 02 Oct 2018 07:51 )             39.5     10-02    139  |  103  |  23  ----------------------------<  116<H>  4.4   |  24  |  1.03    Ca    9.1      02 Oct 2018 05:12  Phos  2.8     10-02  Mg     1.8     10-02    TPro  7.2  /  Alb  4.0  /  TBili  0.3  /  DBili  x   /  AST  24  /  ALT  14  /  AlkPhos  61  09-30      CARDIAC MARKERS ( 01 Oct 2018 17:23 )  x     / x     / 171 U/L / x     / 5.0 ng/mL  CARDIAC MARKERS ( 01 Oct 2018 06:01 )  x     / x     / x     / x     / 5.5 ng/mL      CAPILLARY BLOOD GLUCOSE      POCT Blood Glucose.: 103 mg/dL (30 Sep 2018 11:16)    PT/INR - ( 30 Sep 2018 12:06 )   PT: 10.5 sec;   INR: 0.96 ratio         PTT - ( 02 Oct 2018 05:12 )  PTT:84.8 sec    Procalcitonin, Serum: 0.10 ng/mL (10-02-18 @ 05:12)                  CULTURES: (if applicable)  RVP: +rhino-enterovirus           Physical Examination:  PULM: Rhonchi R>L  CVS: S1, S2 heard    RADIOLOGY REVIEWED  CXR: Elevated L hemidiaphragm   Grossly clear

## 2018-10-02 NOTE — PROGRESS NOTE ADULT - PROBLEM SELECTOR PLAN 1
Concern for PNA given the duration of her symptoms and her suddenly feeling worse over the weekend with purulent sputum   -CT coronary will not visualize all lung braga. d/w Dr. UMM Michele from chest radiology-protocol CT coronary to open field of view to combine CT chest and CT coronary   -f/u sputum culture

## 2018-10-02 NOTE — PROGRESS NOTE ADULT - ASSESSMENT
85yo F pmh COPD, lung cancer s/p lobectomy 13 years ago, PVD, hypothyroid, spinal stenosis recent URI treated with abx p/w fatigue, found to have elevated troponin, JASMIN in 1 lead, and TTE with wall motion abnormalities, concerning for late presentation MI vs NSTEMI vs myocarditis, on heparin gtt/ACS protocol.     1. NSTEMI   atypical presentation, no cp/sob, +weakness   found to have elevated trop as well as abnl ECG with one isolated elevated st segment   echo with new segmental lv dysfxn  clinically pt without hf on exam, no chest pain, cough  echo findings c/w an acute nonischemic, likely stress induced cmp in light of multi territory wall motion abnl seen   continue asa, plavix, iv heparin for now  pending CT coronaries   if no sig disease, will d/c plavix, hep and treat for acute nicmp  cont bb    2. URI infection   stable  continue to observe off abx     3. COPD  stable  continue inhalers     dvt ppx

## 2018-10-02 NOTE — PROGRESS NOTE ADULT - PROBLEM SELECTOR PLAN 4
Elevated CE with new segmental LV dysfunction on TTE  Heparin gtt per cards  -Pending CT coronary and possible cardiac cath

## 2018-10-02 NOTE — PROGRESS NOTE ADULT - ASSESSMENT
85yo F pmh COPD, lung cancer s/p lobectomy 13 years ago, PVD, hypothyroid, spinal stenosis recent URI treated with abx p/w fatigue, found to have elevated troponin, JASMIN in 1 lead, and TTE with wall motion abnormalities, concerning for late presentation MI vs NSTEMI vs myocarditis, on heparin gtt/ACS protocol plan for cath this week by cardiology.      NSTEMI (non-ST elevated myocardial infarction).    - continue heparin gtt protocol  - s/p plavix loading, continue plavix 75qd  - continue asa 81qd  -  low dose metoprolol 12.5bid with hold parameters  -  lipitor 20mg  - oxgyen as needed to maintain O2>92%  - monitor on tele. tele sinus 70 to 80  - ct of the coronaries and poss cardiac cath     Weaknes secondary to URI    -Currently afebrile, normal wbc, non toxic, cxr clear without evidence of pna.  Hold abx at this time,  - check CT of chest to evaluate for pna    Chronic obstructive pulmonary disease   - no  acute exacerbation  - continue home advair equivalent.   - duonebs  - mucinex  - pt was seen by dr simons    PVD (peripheral vascular disease).    - no home meds.  Has h/o stents 2004    Hypothyroidism,  - continue home synthroid.     DVT px  - on hep gtt acs protocol

## 2018-10-02 NOTE — CHART NOTE - NSCHARTNOTEFT_GEN_A_CORE
ROB DANIEL  84y Female  83yo F pmh COPD, lung cancer s/p lobectomy 13 years ago, PVD, hypothyroid, spinal stenosis recent URI treated with abx p/w fatigue, found to have elevated troponin, JASMIN in 1 lead, and TTE with wall motion abnormalities, concerning for late presentation MI vs NSTEMI vs myocarditis, on heparin gtt/ACS protocol.     Event Summary:  Pt ordered for CT coronaries today, however with HR between 78-86 (Imaging requires goal HR closer to 60s). Pt currently on Metoprolol 12.5mg PO BID.  -Discussed with Cards-Dr. Holcomb, recommending additional Metoprolol 12.5mg PO x1, Lopressor 5mg IVP x 1 with NS 250ccs to prevent hypotension.   -Medications ordered and administered, evaluated for 2-3 hours on telemetry, remains with HR between 78-mid 80s.   -Spoke with CT coronaries imaging Dept at 1700, recommends rescheduling to tomorrow with appropriate rate control.   -Dr. Holcomb made aware, agrees with rescheduling tomorrow AM, recommends increasing BB to 25mg PO BID, with IV Lopressor 5mg IVP x 1 prior to imaging with parameters to hold for HR<60.      Nathaniel Saintus [facility]-BC  #[phone]

## 2018-10-03 LAB
-  AMIKACIN: SIGNIFICANT CHANGE UP
-  AZTREONAM: SIGNIFICANT CHANGE UP
-  CEFEPIME: SIGNIFICANT CHANGE UP
-  CEFTAZIDIME: SIGNIFICANT CHANGE UP
-  CIPROFLOXACIN: SIGNIFICANT CHANGE UP
-  GENTAMICIN: SIGNIFICANT CHANGE UP
-  IMIPENEM: SIGNIFICANT CHANGE UP
-  LEVOFLOXACIN: SIGNIFICANT CHANGE UP
-  MEROPENEM: SIGNIFICANT CHANGE UP
-  PIPERACILLIN/TAZOBACTAM: SIGNIFICANT CHANGE UP
-  TOBRAMYCIN: SIGNIFICANT CHANGE UP
APTT BLD: 126.2 SEC — CRITICAL HIGH (ref 27.5–37.4)
APTT BLD: 40.8 SEC — HIGH (ref 27.5–37.4)
APTT BLD: 54.1 SEC — HIGH (ref 27.5–37.4)
CULTURE RESULTS: SIGNIFICANT CHANGE UP
HCT VFR BLD CALC: 40.2 % — SIGNIFICANT CHANGE UP (ref 34.5–45)
HGB BLD-MCNC: 12.5 G/DL — SIGNIFICANT CHANGE UP (ref 11.5–15.5)
MCHC RBC-ENTMCNC: 31.1 GM/DL — LOW (ref 32–36)
MCHC RBC-ENTMCNC: 31.4 PG — SIGNIFICANT CHANGE UP (ref 27–34)
MCV RBC AUTO: 101 FL — HIGH (ref 80–100)
METHOD TYPE: SIGNIFICANT CHANGE UP
ORGANISM # SPEC MICROSCOPIC CNT: SIGNIFICANT CHANGE UP
ORGANISM # SPEC MICROSCOPIC CNT: SIGNIFICANT CHANGE UP
PLATELET # BLD AUTO: 262 K/UL — SIGNIFICANT CHANGE UP (ref 150–400)
RBC # BLD: 3.98 M/UL — SIGNIFICANT CHANGE UP (ref 3.8–5.2)
RBC # FLD: 13.7 % — SIGNIFICANT CHANGE UP (ref 10.3–14.5)
SPECIMEN SOURCE: SIGNIFICANT CHANGE UP
WBC # BLD: 11.21 K/UL — HIGH (ref 3.8–10.5)
WBC # FLD AUTO: 11.21 K/UL — HIGH (ref 3.8–10.5)

## 2018-10-03 PROCEDURE — 75574 CT ANGIO HRT W/3D IMAGE: CPT | Mod: 26

## 2018-10-03 PROCEDURE — 71250 CT THORAX DX C-: CPT | Mod: 26

## 2018-10-03 PROCEDURE — 99222 1ST HOSP IP/OBS MODERATE 55: CPT

## 2018-10-03 RX ORDER — HEPARIN SODIUM 5000 [USP'U]/ML
5000 INJECTION INTRAVENOUS; SUBCUTANEOUS EVERY 12 HOURS
Qty: 0 | Refills: 0 | Status: DISCONTINUED | OUTPATIENT
Start: 2018-10-03 | End: 2018-10-05

## 2018-10-03 RX ORDER — CEFEPIME 1 G/1
1000 INJECTION, POWDER, FOR SOLUTION INTRAMUSCULAR; INTRAVENOUS EVERY 12 HOURS
Qty: 0 | Refills: 0 | Status: DISCONTINUED | OUTPATIENT
Start: 2018-10-04 | End: 2018-10-05

## 2018-10-03 RX ORDER — CEFEPIME 1 G/1
1000 INJECTION, POWDER, FOR SOLUTION INTRAMUSCULAR; INTRAVENOUS ONCE
Qty: 0 | Refills: 0 | Status: COMPLETED | OUTPATIENT
Start: 2018-10-03 | End: 2018-10-03

## 2018-10-03 RX ORDER — MEROPENEM 1 G/30ML
1000 INJECTION INTRAVENOUS ONCE
Qty: 0 | Refills: 0 | Status: COMPLETED | OUTPATIENT
Start: 2018-10-03 | End: 2018-10-03

## 2018-10-03 RX ORDER — MEROPENEM 1 G/30ML
INJECTION INTRAVENOUS
Qty: 0 | Refills: 0 | Status: DISCONTINUED | OUTPATIENT
Start: 2018-10-03 | End: 2018-10-03

## 2018-10-03 RX ORDER — CEFEPIME 1 G/1
INJECTION, POWDER, FOR SOLUTION INTRAMUSCULAR; INTRAVENOUS
Qty: 0 | Refills: 0 | Status: DISCONTINUED | OUTPATIENT
Start: 2018-10-03 | End: 2018-10-05

## 2018-10-03 RX ORDER — CEFEPIME 1 G/1
2000 INJECTION, POWDER, FOR SOLUTION INTRAMUSCULAR; INTRAVENOUS DAILY
Qty: 0 | Refills: 0 | Status: DISCONTINUED | OUTPATIENT
Start: 2018-10-03 | End: 2018-10-03

## 2018-10-03 RX ADMIN — Medication 1 DROP(S): at 10:34

## 2018-10-03 RX ADMIN — Medication 3 MILLILITER(S): at 05:44

## 2018-10-03 RX ADMIN — CLOPIDOGREL BISULFATE 75 MILLIGRAM(S): 75 TABLET, FILM COATED ORAL at 10:33

## 2018-10-03 RX ADMIN — Medication 5 MILLIGRAM(S): at 10:23

## 2018-10-03 RX ADMIN — Medication 75 MICROGRAM(S): at 05:45

## 2018-10-03 RX ADMIN — HEPARIN SODIUM 700 UNIT(S)/HR: 5000 INJECTION INTRAVENOUS; SUBCUTANEOUS at 03:45

## 2018-10-03 RX ADMIN — Medication 25 MILLIGRAM(S): at 05:44

## 2018-10-03 RX ADMIN — CEFEPIME 100 MILLIGRAM(S): 1 INJECTION, POWDER, FOR SOLUTION INTRAMUSCULAR; INTRAVENOUS at 18:12

## 2018-10-03 RX ADMIN — MEROPENEM 100 MILLIGRAM(S): 1 INJECTION INTRAVENOUS at 17:49

## 2018-10-03 RX ADMIN — Medication 25 MILLIGRAM(S): at 21:30

## 2018-10-03 RX ADMIN — HEPARIN SODIUM 700 UNIT(S)/HR: 5000 INJECTION INTRAVENOUS; SUBCUTANEOUS at 10:33

## 2018-10-03 RX ADMIN — Medication 1200 MILLIGRAM(S): at 17:14

## 2018-10-03 RX ADMIN — HEPARIN SODIUM 0 UNIT(S)/HR: 5000 INJECTION INTRAVENOUS; SUBCUTANEOUS at 02:42

## 2018-10-03 RX ADMIN — SENNA PLUS 2 TABLET(S): 8.6 TABLET ORAL at 21:31

## 2018-10-03 RX ADMIN — Medication 3 MILLILITER(S): at 17:14

## 2018-10-03 RX ADMIN — Medication 81 MILLIGRAM(S): at 10:33

## 2018-10-03 RX ADMIN — BUDESONIDE AND FORMOTEROL FUMARATE DIHYDRATE 2 PUFF(S): 160; 4.5 AEROSOL RESPIRATORY (INHALATION) at 17:14

## 2018-10-03 RX ADMIN — BUDESONIDE AND FORMOTEROL FUMARATE DIHYDRATE 2 PUFF(S): 160; 4.5 AEROSOL RESPIRATORY (INHALATION) at 05:44

## 2018-10-03 RX ADMIN — Medication 1200 MILLIGRAM(S): at 05:41

## 2018-10-03 RX ADMIN — ONDANSETRON 4 MILLIGRAM(S): 8 TABLET, FILM COATED ORAL at 05:40

## 2018-10-03 RX ADMIN — Medication 3 MILLILITER(S): at 01:27

## 2018-10-03 NOTE — PROGRESS NOTE ADULT - ASSESSMENT
83yo F pmh COPD, lung cancer s/p lobectomy 13 years ago, PVD, hypothyroid, spinal stenosis recent URI treated with abx p/w fatigue, found to have elevated troponin, JASMIN in 1 lead, and TTE with wall motion abnormalities, concerning for late presentation MI vs NSTEMI vs myocarditis, on heparin gtt/ACS protocol plan for cath this week by cardiology.      NSTEMI (non-ST elevated myocardial infarction).    - continue heparin gtt protocol  - s/p plavix loading, continue plavix 75qd  - continue asa 81qd  -  low dose metoprolol 12.5bid with hold parameters  -  lipitor 20mg  - oxgyen as needed to maintain O2>92%  - monitor on tele. tele sinus 70 to 80  - ct of the coronaries and poss cardiac cath     Weaknes secondary to URI    -Currently afebrile, normal wbc, non toxic, cxr clear without evidence of pna.  Hold abx at this time,  - check CT of chest to evaluate for pna    Chronic obstructive pulmonary disease   - no  acute exacerbation  - continue home advair equivalent.   - duonebs  - mucinex  - pt was seen by dr simons    PVD (peripheral vascular disease).    - no home meds.  Has h/o stents 2004    Hypothyroidism,  - continue home synthroid.     DVT px  - on hep gtt acs protocol

## 2018-10-03 NOTE — CHART NOTE - NSCHARTNOTEFT_GEN_A_CORE
MEDICINE NP NOTE  Spectra [id number]      Received phone call from radiology with CT Chest results showing right lower lung bronchopneumonia. Recommend follow-up imaging in 4 weeks after treatment to ensure resolution.  Small right pleural effusion.  Results discussed with Мария KO from pulmonary, will start patient on Merepenem.  Recommend ID consult.  Dr Nazario aware.  House ID consult called.  Will continue to monitor.

## 2018-10-03 NOTE — CHART NOTE - NSCHARTNOTEFT_GEN_A_CORE
PTT - ( 03 Oct 2018 01:52 )  PTT:126.2 sec      Follow heparin gtt ACS normogram  monitor for bleeding  trend cbc, ptt  Will follow closely  will update with primary team    Asa Comer FNP [place]  [phone]

## 2018-10-03 NOTE — PROGRESS NOTE ADULT - PROBLEM SELECTOR PLAN 4
Elevated CE with new segmental LV dysfunction on TTE  Heparin gtt per cards  -Pending CT coronary and possible cardiac cath Elevated CE with new segmental LV dysfunction on TTE  Heparin gtt per cards  -CT coronary done- f/u cards recs

## 2018-10-03 NOTE — PROVIDER CONTACT NOTE (CRITICAL VALUE NOTIFICATION) - BACKGROUND
Patient admitted with SOB and NSTEMI. Previously on heparin gtt running at 8.5 ml/hr. PMH: PVD, COPD, URI.

## 2018-10-03 NOTE — PROVIDER CONTACT NOTE (CRITICAL VALUE NOTIFICATION) - ACTION/TREATMENT ORDERED:
NP aware. Follow nomogram as ordered. Heparin gtt held for 1h, now restarted to 7 ml/hr. Will continue to monitor.

## 2018-10-03 NOTE — CONSULT NOTE ADULT - ASSESSMENT
83yo F pmh COPD, lung cancer s/p lobectomy 13 years ago, PVD, hypothyroid, spinal stenosis recent URI treated with abx p/w fatigue, found to have elevated troponin, JASMIN in 1 lead, and TTE with wall motion abnormalities, concerning for late presentation MI vs NSTEMI vs myocarditis. She also has evidence of pneumonia on CT chest with recent URI symptoms and is positive for Rhinovirus/Enterovirus. Sputum cultures positive for mod. pseudomonas. Recent abx use is azithro and doxy, has intolerance not a true allergy to quinolones.    Suggest: 85yo F pmh COPD, lung cancer s/p lobectomy 13 years ago, PVD, hypothyroid, spinal stenosis recent URI treated with abx p/w fatigue, found to have elevated troponin, JASMIN in 1 lead, and TTE with wall motion abnormalities, concerning for late presentation MI vs NSTEMI vs myocarditis. She also has evidence of pneumonia on CT chest with recent URI symptoms and is positive for Rhinovirus/Enterovirus. Sputum cultures positive for mod. pseudomonas. Recent abx use is azithro and doxy, has intolerance not a true allergy to quinolones.    Suggest:  -Stop Meropenem  -Start Cefepime 2g q24hrs likely for 5-7 days   -follow up on sputum culture sensitivities   -trend WBC and fever curve  -case discussed with NP 83yo F pmh COPD, lung cancer s/p lobectomy 13 years ago, PVD, hypothyroid, spinal stenosis recent URI treated with abx p/w fatigue, found to have elevated troponin, JASMIN in 1 lead, and TTE with wall motion abnormalities, concerning for late presentation MI vs NSTEMI vs myocarditis. She also has evidence of pneumonia on CT chest with recent URI symptoms and is positive for Rhinovirus/Enterovirus. Sputum cultures positive for mod. pseudomonas. Recent abx use is azithro and doxy, has intolerance not a true allergy to quinolones.    Suggest:  -Stop Meropenem  -Start Cefepime 1g q12hrs (based on creatinine clearance) likely for 5-7 days   -follow up on sputum culture sensitivities   -trend WBC and fever curve  -case discussed with NP 85yo F pmh COPD, lung cancer s/p lobectomy 13 years ago, PVD, hypothyroid, spinal stenosis recent URI treated with abx p/w fatigue, found to have elevated troponin, JASMIN in 1 lead, and TTE with wall motion abnormalities, concerning for late presentation MI vs NSTEMI vs myocarditis. She also has evidence of pneumonia on CT chest with recent URI symptoms and is positive for Rhinovirus/Enterovirus. Sputum cultures positive for mod. pseudomonas. Recent abx use is azithro and doxy, has intolerance not a true allergy to quinolones.  Overall Sepsis (tachycardia, tachypnea, hypoxia) due to pseudomonas pneumonia superimposed on viral infection. Leucocytosis.       Suggest:  -Stop Meropenem  -Start Cefepime 1g q12hrs (based on creatinine clearance) likely for 7-8 days   -follow up on sputum culture sensitivities   -trend WBC and fever curve  -case discussed with NP

## 2018-10-03 NOTE — CONSULT NOTE ADULT - ATTENDING COMMENTS
Patient seen and examined.  Agree with above NP note.  patient is an 84 year old woman with copd hx admitted with increased fatigue, dyspnea, nausea  recently tx as outpt for poss PNA  found to have elevated trop as well as abnl ECG with one isolated elevated st segment   echo with new segmental lv dysfxn  clinically pt without hf on exam  no chest pain, cough  echo findings c/w an acute nonischemic, likely stress induced cmp in light of multi territory wall motion abnl seen   will treat as NSTEMI for now with asa, plavix, iv heparin for now  d/w dtr and son regarding ischemic eval   all decided on ct angio of coronaries in light of mother's age, frailty, and very sensitive nature to any meds taken etc  awaits ct non chest to eval for infiltrate  will await ct angio cors and eval both lung and coronaries   if no sig disease, will d/c plavix, hep and treat for acute nicmp  cont bb  d/c if sbp can not tolerate  no ace/arb due to low bp
Andrez Latham  Pager: [phone]. If no response or past 5 pm call [phone].
Agree with plan as outlined above.  Asymptomatic with JASMIN and new WMAs.   Possibilities include MI, or Stress CM, Focal Myocarditis.  Will plan for C.   Dr. Holcomb to take over case.
as above  no steroids 2 to patient side effects and upcoming cath

## 2018-10-03 NOTE — PROGRESS NOTE ADULT - SUBJECTIVE AND OBJECTIVE BOX
CARDIOLOGY FOLLOW UP - Dr. Holcomb    CC no cp/sob       PHYSICAL EXAM:  T(C): 36.4 (10-03-18 @ 04:05), Max: 36.9 (10-02-18 @ 12:50)  HR: 91 (10-03-18 @ 10:21) (81 - 91)  BP: 114/66 (10-03-18 @ 10:21) (91/48 - 119/68)  RR: 18 (10-03-18 @ 04:05) (18 - 19)  SpO2: 97% (10-03-18 @ 04:05) (92% - 97%)  Wt(kg): --  I&O's Summary    02 Oct 2018 07:01  -  03 Oct 2018 07:00  --------------------------------------------------------  IN: 1267.5 mL / OUT: 1080 mL / NET: 187.5 mL    03 Oct 2018 07:01  -  03 Oct 2018 10:27  --------------------------------------------------------  IN: 240 mL / OUT: 600 mL / NET: -360 mL        Appearance: Normal	  Cardiovascular: Normal S1 S2,RRR, No JVD, No murmurs  Respiratory: Lungs clear to auscultation	  Gastrointestinal:  Soft, Non-tender, + BS	  Extremities: Normal range of motion, No clubbing, cyanosis or edema        MEDICATIONS  (STANDING):  ALBUTerol/ipratropium for Nebulization 3 milliLiter(s) Nebulizer every 6 hours  artificial tears (preservative free) Ophthalmic Solution 1 Drop(s) Both EYES daily  aspirin enteric coated 81 milliGRAM(s) Oral daily  atorvastatin 20 milliGRAM(s) Oral at bedtime  buDESOnide 160 MICROgram(s)/formoterol 4.5 MICROgram(s) Inhaler 2 Puff(s) Inhalation two times a day  clopidogrel Tablet 75 milliGRAM(s) Oral daily  guaiFENesin ER 1200 milliGRAM(s) Oral every 12 hours  heparin  Infusion. 600 Unit(s)/Hr (6 mL/Hr) IV Continuous <Continuous>  influenza   Vaccine 0.5 milliLiter(s) IntraMuscular once  levothyroxine 75 MICROGram(s) Oral daily  metoprolol tartrate 25 milliGRAM(s) Oral two times a day  senna 2 Tablet(s) Oral at bedtime  sodium chloride 0.9%. 1000 milliLiter(s) (50 mL/Hr) IV Continuous <Continuous>      TELEMETRY: NSR 70-90s 	    ECG:  	  RADIOLOGY:   DIAGNOSTIC TESTING:  [ ] Echocardiogram:  [ ]  Catheterization:  [ ] Stress Test:    OTHER: 	    LABS:	 	                                12.1   11.04 )-----------( 260      ( 02 Oct 2018 07:51 )             39.5     10-02    139  |  103  |  23  ----------------------------<  116<H>  4.4   |  24  |  1.03    Ca    9.1      02 Oct 2018 05:12  Phos  2.8     10-02  Mg     1.8     10-02      PTT - ( 03 Oct 2018 09:41 )  PTT:54.1 sec

## 2018-10-03 NOTE — PROGRESS NOTE ADULT - ASSESSMENT
83yo F pmh COPD, lung cancer s/p lobectomy 13 years ago, PVD, hypothyroid, spinal stenosis recent URI treated with abx p/w fatigue, found to have elevated troponin, JASMIN in 1 lead, and TTE with wall motion abnormalities, concerning for late presentation MI vs NSTEMI vs myocarditis, on heparin gtt/ACS protocol.     1. NSTEMI   atypical presentation, no cp/sob, +weakness   found to have elevated trop as well as abnl ECG with one isolated elevated st segment   echo with new segmental lv dysfxn  clinically pt without hf on exam, no chest pain, cough  echo findings c/w an acute nonischemic, likely stress induced cmp in light of multi territory wall motion abnl seen   continue asa, plavix, iv heparin for now  pending CT coronaries   if no sig disease, will d/c plavix, hep and treat for acute nicmp  cont bb    2. URI infection   stable  continue to observe off abx     3. COPD  stable  continue inhalers     dvt ppx

## 2018-10-03 NOTE — PROGRESS NOTE ADULT - SUBJECTIVE AND OBJECTIVE BOX
Patient is a 84y old  Female who presents with a chief complaint of 'I feel like I was hit by a truck' (03 Oct 2018 11:38)      SUBJECTIVE / OVERNIGHT EVENTS:  No chest pain. No shortness of breath. No complaints. No events overnight.     MEDICATIONS  (STANDING):  ALBUTerol/ipratropium for Nebulization 3 milliLiter(s) Nebulizer every 6 hours  artificial tears (preservative free) Ophthalmic Solution 1 Drop(s) Both EYES daily  aspirin enteric coated 81 milliGRAM(s) Oral daily  atorvastatin 20 milliGRAM(s) Oral at bedtime  buDESOnide 160 MICROgram(s)/formoterol 4.5 MICROgram(s) Inhaler 2 Puff(s) Inhalation two times a day  clopidogrel Tablet 75 milliGRAM(s) Oral daily  guaiFENesin ER 1200 milliGRAM(s) Oral every 12 hours  heparin  Infusion. 600 Unit(s)/Hr (6 mL/Hr) IV Continuous <Continuous>  influenza   Vaccine 0.5 milliLiter(s) IntraMuscular once  levothyroxine 75 MICROGram(s) Oral daily  metoprolol tartrate 25 milliGRAM(s) Oral two times a day  senna 2 Tablet(s) Oral at bedtime  sodium chloride 0.9%. 1000 milliLiter(s) (50 mL/Hr) IV Continuous <Continuous>    MEDICATIONS  (PRN):  acetaminophen   Tablet .. 500 milliGRAM(s) Oral every 6 hours PRN Temp greater or equal to 38C (100.4F), Moderate Pain (4 - 6)  heparin  Injectable 2700 Unit(s) IV Push every 6 hours PRN For aPTT less than 40  ondansetron Injectable 4 milliGRAM(s) IV Push every 6 hours PRN Nausea and/or Vomiting      Vital Signs Last 24 Hrs  T(C): 36.4 (03 Oct 2018 12:46), Max: 36.7 (02 Oct 2018 20:18)  T(F): 97.6 (03 Oct 2018 12:46), Max: 98 (02 Oct 2018 20:18)  HR: 77 (03 Oct 2018 12:46) (72 - 91)  BP: 100/61 (03 Oct 2018 12:46) (91/48 - 119/68)  BP(mean): --  RR: 18 (03 Oct 2018 12:46) (18 - 19)  SpO2: 97% (03 Oct 2018 12:46) (92% - 97%)  CAPILLARY BLOOD GLUCOSE        I&O's Summary    02 Oct 2018 07:01  -  03 Oct 2018 07:00  --------------------------------------------------------  IN: 1267.5 mL / OUT: 1080 mL / NET: 187.5 mL    03 Oct 2018 07:01  -  03 Oct 2018 13:07  --------------------------------------------------------  IN: 240 mL / OUT: 600 mL / NET: -360 mL        PHYSICAL EXAM:  GENERAL: NAD, well-developed  HEAD:  Atraumatic, Normocephalic  EYES: EOMI, PERRLA, conjunctiva and sclera clear  NECK: Supple, No JVD  CHEST/LUNG: Clear to auscultation bilaterally; No wheeze  HEART: Regular rate and rhythm; No murmurs, rubs, or gallops  ABDOMEN: Soft, Nontender, Nondistended; Bowel sounds present  EXTREMITIES:  2+ Peripheral Pulses, No clubbing, cyanosis, or edema  PSYCH: AAOx3  NEUROLOGY: non-focal  SKIN: No rashes or lesions    LABS:                        12.1   11.04 )-----------( 260      ( 02 Oct 2018 07:51 )             39.5     10-02    139  |  103  |  23  ----------------------------<  116<H>  4.4   |  24  |  1.03    Ca    9.1      02 Oct 2018 05:12  Phos  2.8     10-02  Mg     1.8     10-02      PTT - ( 03 Oct 2018 09:41 )  PTT:54.1 sec  CARDIAC MARKERS ( 01 Oct 2018 17:23 )  x     / x     / 171 U/L / x     / 5.0 ng/mL          RADIOLOGY & ADDITIONAL TESTS:    Imaging Personally Reviewed:    Consultant(s) Notes Reviewed:      Care Discussed with Consultants/Other Providers:

## 2018-10-03 NOTE — PROGRESS NOTE ADULT - PROBLEM SELECTOR PLAN 1
Concern for PNA given the duration of her symptoms and her suddenly feeling worse over the weekend with purulent sputum   -CT coronary will not visualize all lung braga. d/w Dr. UMM Michele from chest radiology-protocol CT coronary to open field of view to combine CT chest and CT coronary   -Sputum culture: Pseudomonas   -Still awaiting CT chest ct chest c/w RLL PNA  abx as per ID

## 2018-10-03 NOTE — CONSULT NOTE ADULT - REASON FOR ADMISSION
'I feel like I was hit by a truck'

## 2018-10-03 NOTE — PROGRESS NOTE ADULT - SUBJECTIVE AND OBJECTIVE BOX
Follow-up Pulm Progress Note    Cough is slowly improving but still junky   92% on 4L NC    Medications:  MEDICATIONS  (STANDING):  ALBUTerol/ipratropium for Nebulization 3 milliLiter(s) Nebulizer every 6 hours  artificial tears (preservative free) Ophthalmic Solution 1 Drop(s) Both EYES daily  aspirin enteric coated 81 milliGRAM(s) Oral daily  atorvastatin 20 milliGRAM(s) Oral at bedtime  buDESOnide 160 MICROgram(s)/formoterol 4.5 MICROgram(s) Inhaler 2 Puff(s) Inhalation two times a day  clopidogrel Tablet 75 milliGRAM(s) Oral daily  guaiFENesin ER 1200 milliGRAM(s) Oral every 12 hours  heparin  Infusion. 600 Unit(s)/Hr (6 mL/Hr) IV Continuous <Continuous>  influenza   Vaccine 0.5 milliLiter(s) IntraMuscular once  levothyroxine 75 MICROGram(s) Oral daily  metoprolol tartrate 25 milliGRAM(s) Oral two times a day  senna 2 Tablet(s) Oral at bedtime  sodium chloride 0.9%. 1000 milliLiter(s) (50 mL/Hr) IV Continuous <Continuous>    MEDICATIONS  (PRN):  acetaminophen   Tablet .. 500 milliGRAM(s) Oral every 6 hours PRN Temp greater or equal to 38C (100.4F), Moderate Pain (4 - 6)  heparin  Injectable 2700 Unit(s) IV Push every 6 hours PRN For aPTT less than 40  ondansetron Injectable 4 milliGRAM(s) IV Push every 6 hours PRN Nausea and/or Vomiting          Vital Signs Last 24 Hrs  T(C): 36.4 (03 Oct 2018 04:05), Max: 36.9 (02 Oct 2018 12:50)  T(F): 97.5 (03 Oct 2018 04:05), Max: 98.4 (02 Oct 2018 12:50)  HR: 72 (03 Oct 2018 10:45) (72 - 91)  BP: 110/72 (03 Oct 2018 10:45) (91/48 - 119/68)  BP(mean): --  RR: 18 (03 Oct 2018 04:05) (18 - 19)  SpO2: 97% (03 Oct 2018 04:05) (92% - 97%) on 4L NC          10-02 @ 07:01  -  10-03 @ 07:00  --------------------------------------------------------  IN: 1267.5 mL / OUT: 1080 mL / NET: 187.5 mL          LABS:                        12.1   11.04 )-----------( 260      ( 02 Oct 2018 07:51 )             39.5     10-02    139  |  103  |  23  ----------------------------<  116<H>  4.4   |  24  |  1.03    Ca    9.1      02 Oct 2018 05:12  Phos  2.8     10-02  Mg     1.8     10-02        CARDIAC MARKERS ( 01 Oct 2018 17:23 )  x     / x     / 171 U/L / x     / 5.0 ng/mL      CAPILLARY BLOOD GLUCOSE        PTT - ( 03 Oct 2018 09:41 )  PTT:54.1 sec    Procalcitonin, Serum: 0.10 ng/mL (10-02-18 @ 05:12)                  CULTURES: (if applicable)  Culture Results:   Moderate Pseudomonas aeruginosa  Normal Respiratory Vijaya present (10-02 @ 02:18)    Most recent blood culture -- 10-02 @ 02:18   -- -- .Sputum Sputum 10-02 @ 02:18    Blood culture 10-02 @ 02:18  --    Numerous polymorphonuclear leukocytes  Few Squamous epithelial cells per low power field  Numerous Gram Negative Rods  Numerous Gram positive cocci in pairs, chains and clusters per oil power  field  --  --  --    Urine culture    -->      Physical Examination:  PULM: Rhonchi R>L  CVS: S1, S2 heard    RADIOLOGY REVIEWED  CXR: Elevated L hemidiaphragm Follow-up Pulm Progress Note    Cough is slowly improving but still junky   92% on 4L NC    Medications:  MEDICATIONS  (STANDING):  ALBUTerol/ipratropium for Nebulization 3 milliLiter(s) Nebulizer every 6 hours  artificial tears (preservative free) Ophthalmic Solution 1 Drop(s) Both EYES daily  aspirin enteric coated 81 milliGRAM(s) Oral daily  atorvastatin 20 milliGRAM(s) Oral at bedtime  buDESOnide 160 MICROgram(s)/formoterol 4.5 MICROgram(s) Inhaler 2 Puff(s) Inhalation two times a day  clopidogrel Tablet 75 milliGRAM(s) Oral daily  guaiFENesin ER 1200 milliGRAM(s) Oral every 12 hours  heparin  Infusion. 600 Unit(s)/Hr (6 mL/Hr) IV Continuous <Continuous>  influenza   Vaccine 0.5 milliLiter(s) IntraMuscular once  levothyroxine 75 MICROGram(s) Oral daily  metoprolol tartrate 25 milliGRAM(s) Oral two times a day  senna 2 Tablet(s) Oral at bedtime  sodium chloride 0.9%. 1000 milliLiter(s) (50 mL/Hr) IV Continuous <Continuous>    MEDICATIONS  (PRN):  acetaminophen   Tablet .. 500 milliGRAM(s) Oral every 6 hours PRN Temp greater or equal to 38C (100.4F), Moderate Pain (4 - 6)  heparin  Injectable 2700 Unit(s) IV Push every 6 hours PRN For aPTT less than 40  ondansetron Injectable 4 milliGRAM(s) IV Push every 6 hours PRN Nausea and/or Vomiting          Vital Signs Last 24 Hrs  T(C): 36.4 (03 Oct 2018 04:05), Max: 36.9 (02 Oct 2018 12:50)  T(F): 97.5 (03 Oct 2018 04:05), Max: 98.4 (02 Oct 2018 12:50)  HR: 72 (03 Oct 2018 10:45) (72 - 91)  BP: 110/72 (03 Oct 2018 10:45) (91/48 - 119/68)  BP(mean): --  RR: 18 (03 Oct 2018 04:05) (18 - 19)  SpO2: 97% (03 Oct 2018 04:05) (92% - 97%) on 4L NC          10-02 @ 07:01  -  10-03 @ 07:00  --------------------------------------------------------  IN: 1267.5 mL / OUT: 1080 mL / NET: 187.5 mL    LABS:                        12.1   11.04 )-----------( 260      ( 02 Oct 2018 07:51 )             39.5     10-02    139  |  103  |  23  ----------------------------<  116<H>  4.4   |  24  |  1.03    Ca    9.1      02 Oct 2018 05:12  Phos  2.8     10-02  Mg     1.8     10-02        CARDIAC MARKERS ( 01 Oct 2018 17:23 )  x     / x     / 171 U/L / x     / 5.0 ng/mL      CAPILLARY BLOOD GLUCOSE        PTT - ( 03 Oct 2018 09:41 )  PTT:54.1 sec    Procalcitonin, Serum: 0.10 ng/mL (10-02-18 @ 05:12)    CULTURES: (if applicable)  Culture Results:   Moderate Pseudomonas aeruginosa  Normal Respiratory Vijaya present (10-02 @ 02:18)    Most recent blood culture -- 10-02 @ 02:18   -- -- .Sputum Sputum 10-02 @ 02:18    Physical Examination:  PULM: Rhonchi R>L  CVS: S1, S2 heard    RADIOLOGY REVIEWED  CXR: Elevated L hemidiaphragm

## 2018-10-04 DIAGNOSIS — J18.9 PNEUMONIA, UNSPECIFIED ORGANISM: ICD-10-CM

## 2018-10-04 LAB
HCT VFR BLD CALC: 37.5 % — SIGNIFICANT CHANGE UP (ref 34.5–45)
HGB BLD-MCNC: 11.9 G/DL — SIGNIFICANT CHANGE UP (ref 11.5–15.5)
MCHC RBC-ENTMCNC: 31.7 GM/DL — LOW (ref 32–36)
MCHC RBC-ENTMCNC: 31.9 PG — SIGNIFICANT CHANGE UP (ref 27–34)
MCV RBC AUTO: 100.5 FL — HIGH (ref 80–100)
PLATELET # BLD AUTO: 259 K/UL — SIGNIFICANT CHANGE UP (ref 150–400)
RBC # BLD: 3.73 M/UL — LOW (ref 3.8–5.2)
RBC # FLD: 13.9 % — SIGNIFICANT CHANGE UP (ref 10.3–14.5)
WBC # BLD: 11.88 K/UL — HIGH (ref 3.8–10.5)
WBC # FLD AUTO: 11.88 K/UL — HIGH (ref 3.8–10.5)

## 2018-10-04 PROCEDURE — 99232 SBSQ HOSP IP/OBS MODERATE 35: CPT

## 2018-10-04 RX ORDER — DOCUSATE SODIUM 100 MG
100 CAPSULE ORAL
Qty: 0 | Refills: 0 | Status: DISCONTINUED | OUTPATIENT
Start: 2018-10-04 | End: 2018-10-04

## 2018-10-04 RX ORDER — LACTULOSE 10 G/15ML
20 SOLUTION ORAL ONCE
Qty: 0 | Refills: 0 | Status: COMPLETED | OUTPATIENT
Start: 2018-10-04 | End: 2018-10-04

## 2018-10-04 RX ADMIN — HEPARIN SODIUM 5000 UNIT(S): 5000 INJECTION INTRAVENOUS; SUBCUTANEOUS at 17:42

## 2018-10-04 RX ADMIN — CEFEPIME 100 MILLIGRAM(S): 1 INJECTION, POWDER, FOR SOLUTION INTRAMUSCULAR; INTRAVENOUS at 17:43

## 2018-10-04 RX ADMIN — BUDESONIDE AND FORMOTEROL FUMARATE DIHYDRATE 2 PUFF(S): 160; 4.5 AEROSOL RESPIRATORY (INHALATION) at 17:43

## 2018-10-04 RX ADMIN — Medication 75 MICROGRAM(S): at 05:08

## 2018-10-04 RX ADMIN — Medication 1200 MILLIGRAM(S): at 05:08

## 2018-10-04 RX ADMIN — Medication 25 MILLIGRAM(S): at 05:08

## 2018-10-04 RX ADMIN — HEPARIN SODIUM 5000 UNIT(S): 5000 INJECTION INTRAVENOUS; SUBCUTANEOUS at 05:09

## 2018-10-04 RX ADMIN — Medication 3 MILLILITER(S): at 00:08

## 2018-10-04 RX ADMIN — Medication 3 MILLILITER(S): at 23:46

## 2018-10-04 RX ADMIN — CEFEPIME 100 MILLIGRAM(S): 1 INJECTION, POWDER, FOR SOLUTION INTRAMUSCULAR; INTRAVENOUS at 05:09

## 2018-10-04 RX ADMIN — SENNA PLUS 2 TABLET(S): 8.6 TABLET ORAL at 23:46

## 2018-10-04 RX ADMIN — LACTULOSE 20 GRAM(S): 10 SOLUTION ORAL at 11:15

## 2018-10-04 RX ADMIN — Medication 3 MILLILITER(S): at 05:08

## 2018-10-04 RX ADMIN — Medication 1200 MILLIGRAM(S): at 17:42

## 2018-10-04 RX ADMIN — ONDANSETRON 4 MILLIGRAM(S): 8 TABLET, FILM COATED ORAL at 14:58

## 2018-10-04 RX ADMIN — BUDESONIDE AND FORMOTEROL FUMARATE DIHYDRATE 2 PUFF(S): 160; 4.5 AEROSOL RESPIRATORY (INHALATION) at 05:08

## 2018-10-04 RX ADMIN — Medication 81 MILLIGRAM(S): at 11:16

## 2018-10-04 RX ADMIN — Medication 1 DROP(S): at 17:04

## 2018-10-04 RX ADMIN — Medication 3 MILLILITER(S): at 17:40

## 2018-10-04 RX ADMIN — Medication 3 MILLILITER(S): at 11:16

## 2018-10-04 NOTE — PHYSICAL THERAPY INITIAL EVALUATION ADULT - CRITERIA FOR SKILLED THERAPEUTIC INTERVENTIONS
functional limitations in following categories/predicted duration of therapy intervention/anticipated equipment needs at discharge/impairments found/risk reduction/prevention/therapy frequency/rehab potential/anticipated discharge recommendation

## 2018-10-04 NOTE — PROGRESS NOTE ADULT - ASSESSMENT
85yo F pmh COPD, lung cancer s/p lobectomy 13 years ago, PVD, hypothyroid, spinal stenosis recent URI treated with abx p/w fatigue, found to have elevated troponin, JASMIN in 1 lead, and TTE with wall motion abnormalities, concerning for late presentation MI vs NSTEMI vs myocarditis, on heparin gtt/ACS protocol.     1. NSTEMI   atypical presentation, no cp/sob, +weakness   found to have elevated trop as well as abnl ECG with one isolated elevated st segment   CT Coronaries results noted, no significant CAD, LVEF is normal  continue asa, bb     2. Pneumonia  Superimposed RLL bacterial PNA  on IV abx   wean off O2 as tolerated     3. COPD  stable  continue inhalers     dvt ppx 83yo F pmh COPD, lung cancer s/p lobectomy 13 years ago, PVD, hypothyroid, spinal stenosis recent URI treated with abx p/w fatigue, found to have elevated troponin, JASMIN in 1 lead, and TTE with wall motion abnormalities, concerning for late presentation MI vs NSTEMI vs myocarditis, on heparin gtt/ACS protocol.     1. NSTEMI secondary to stress induced cardiomyopathy (takotsubo)  atypical presentation, no cp/sob, +weakness   found to have elevated trop as well as abnl ECG with one isolated elevated st segment   CT Coronaries results noted, no significant CAD, LVEF is normal  continue asa, bb     2. acute systolic HF, nonischemic cmp  stable, lv fxn improved on CTA  cont bb as bp tolerates  no ace due to low bp     3. Pneumonia  Superimposed RLL bacterial PNA  on IV abx   wean off O2 as tolerated     4. COPD  stable  continue inhalers     dvt ppx

## 2018-10-04 NOTE — PROGRESS NOTE ADULT - SUBJECTIVE AND OBJECTIVE BOX
Follow-up Pulm Progress Note    No new respiratory events overnight.  Denies SOB/CP.   Feeling much better  94% on 4L NC    Medications:  MEDICATIONS  (STANDING):  ALBUTerol/ipratropium for Nebulization 3 milliLiter(s) Nebulizer every 6 hours  artificial tears (preservative free) Ophthalmic Solution 1 Drop(s) Both EYES daily  aspirin enteric coated 81 milliGRAM(s) Oral daily  buDESOnide 160 MICROgram(s)/formoterol 4.5 MICROgram(s) Inhaler 2 Puff(s) Inhalation two times a day  cefepime   IVPB      cefepime   IVPB 1000 milliGRAM(s) IV Intermittent every 12 hours  guaiFENesin ER 1200 milliGRAM(s) Oral every 12 hours  heparin  Injectable 5000 Unit(s) SubCutaneous every 12 hours  influenza   Vaccine 0.5 milliLiter(s) IntraMuscular once  levothyroxine 75 MICROGram(s) Oral daily  metoprolol tartrate 25 milliGRAM(s) Oral two times a day  senna 2 Tablet(s) Oral at bedtime  sodium chloride 0.9%. 1000 milliLiter(s) (50 mL/Hr) IV Continuous <Continuous>    MEDICATIONS  (PRN):  acetaminophen   Tablet .. 500 milliGRAM(s) Oral every 6 hours PRN Temp greater or equal to 38C (100.4F), Moderate Pain (4 - 6)  ondansetron Injectable 4 milliGRAM(s) IV Push every 6 hours PRN Nausea and/or Vomiting          Vital Signs Last 24 Hrs  T(C): 36.9 (04 Oct 2018 04:28), Max: 37.1 (03 Oct 2018 21:29)  T(F): 98.4 (04 Oct 2018 04:28), Max: 98.7 (03 Oct 2018 21:29)  HR: 92 (04 Oct 2018 04:28) (72 - 100)  BP: 102/62 (04 Oct 2018 04:28) (92/55 - 102/62)  BP(mean): --  RR: 18 (04 Oct 2018 04:28) (18 - 18)  SpO2: 96% (04 Oct 2018 04:28) (93% - 97%) on 4L NC          10-03 @ 07:01  -  10-04 @ 07:00  --------------------------------------------------------  IN: 990 mL / OUT: 1720 mL / NET: -730 mL          LABS:                        11.9   11.88 )-----------( 259      ( 04 Oct 2018 07:58 )             37.5                 CAPILLARY BLOOD GLUCOSE        PTT - ( 03 Oct 2018 17:29 )  PTT:40.8 sec    Procalcitonin, Serum: 0.10 ng/mL (10-02-18 @ 05:12)                  CULTURES: (if applicable)  Culture Results:   Numerous Pseudomonas aeruginosa  Normal Respiratory Vijaya present (10-02 @ 02:18)    Most recent blood culture -- 10-02 @ 02:18   Pseudomonas aeruginosa Pseudomonas aeruginosa .Sputum Sputum 10-02 @ 02:18    Blood culture 10-02 @ 02:18  --    Numerous polymorphonuclear leukocytes  Few Squamous epithelial cells per low power field  Numerous Gram Negative Rods  Numerous Gram positive cocci in pairs, chains and clusters per oil power  field  COREEN  Pseudomonas aeruginosa  Pseudomonas aeruginosa    Urine culture    -->      Physical Examination:  PULM: Rhonchi/crackles R base  CVS: S1, S2 heard    RADIOLOGY REVIEWED  CT chest: < from: CT Chest No Cont (10.03.18 @ 11:20) >  LUNGS AND LARGE AIRWAYS:     Interval obliteration of the segmental branches of the right lower lobe   bronchus likely secondary to retained secretions.    New dense consolidation in the right lower lobe. There are also right   lower lobe and right middle lobe tree-in-bud opacities, increased from   prior. These findings collectively represent bronchopneumonia of the   right lower lobe.    Tracheomegaly.  Surgical changes from prior left upper lobe lobectomy without evidence of   recurrence. Unchanged elevation of left hemidiaphragm.    PLEURA: Small new right parapneumonic pleural effusion. No pneumothorax.    VESSELS: Calcific atherosclerotic disease of the aorta. The pulmonary   artery is not enlarged. There is no aortic aneurysm. Blood pool in the   aorta is hypodense relative to the myocardium and vessel wall, suggestive   of anemia.    HEART: Heart size is normal. No pericardial effusion.    MEDIASTINUM AND ALEXANDRA: There is a 1 cm left lower paratracheal lymph   nodes, increased in size since the prior exam (series 4 image 316). There   is also a 6 mm right hilar lymph node.  These lymph nodes can be seen in   the context of infection. The esophagus is unremarkable.    CHEST WALL AND LOWER NECK: Limited evaluation of the lower neck secondary   to left shoulder orthopedic hardware metal artifact. However, within the   limits of this exam, the visualized lower neck and chest wall are within   normal limits.    VISUALIZED UPPER ABDOMEN: Hyperdense left renal lesion (series 4 image   848) stable since its abdomen pelvis 5/12/2015. The remainder of the   upper abdomen is unremarkable    BONES: Degenerative changes of the spine. Old healed fractures of the   right sixth through 10th ribs.    IMPRESSION:   1.  Right lower lung bronchopneumonia. Recommend follow-up imaging in 4   weeks after treatment to ensure resolution.  2.  Small right pleural effusion.    < end of copied text > Follow-up Pulm Progress Note    No new respiratory events overnight.  Denies SOB/CP.   Feeling much better  94% on 4L NC    Medications:  MEDICATIONS  (STANDING):  ALBUTerol/ipratropium for Nebulization 3 milliLiter(s) Nebulizer every 6 hours  artificial tears (preservative free) Ophthalmic Solution 1 Drop(s) Both EYES daily  aspirin enteric coated 81 milliGRAM(s) Oral daily  buDESOnide 160 MICROgram(s)/formoterol 4.5 MICROgram(s) Inhaler 2 Puff(s) Inhalation two times a day  cefepime   IVPB      cefepime   IVPB 1000 milliGRAM(s) IV Intermittent every 12 hours  guaiFENesin ER 1200 milliGRAM(s) Oral every 12 hours  heparin  Injectable 5000 Unit(s) SubCutaneous every 12 hours  influenza   Vaccine 0.5 milliLiter(s) IntraMuscular once  levothyroxine 75 MICROGram(s) Oral daily  metoprolol tartrate 25 milliGRAM(s) Oral two times a day  senna 2 Tablet(s) Oral at bedtime  sodium chloride 0.9%. 1000 milliLiter(s) (50 mL/Hr) IV Continuous <Continuous>    MEDICATIONS  (PRN):  acetaminophen   Tablet .. 500 milliGRAM(s) Oral every 6 hours PRN Temp greater or equal to 38C (100.4F), Moderate Pain (4 - 6)  ondansetron Injectable 4 milliGRAM(s) IV Push every 6 hours PRN Nausea and/or Vomiting    Vital Signs Last 24 Hrs  T(C): 36.9 (04 Oct 2018 04:28), Max: 37.1 (03 Oct 2018 21:29)  T(F): 98.4 (04 Oct 2018 04:28), Max: 98.7 (03 Oct 2018 21:29)  HR: 92 (04 Oct 2018 04:28) (72 - 100)  BP: 102/62 (04 Oct 2018 04:28) (92/55 - 102/62)  BP(mean): --  RR: 18 (04 Oct 2018 04:28) (18 - 18)  SpO2: 96% (04 Oct 2018 04:28) (93% - 97%) on 4L NC    10-03 @ 07:01  -  10-04 @ 07:00  --------------------------------------------------------  IN: 990 mL / OUT: 1720 mL / NET: -730 mL    LABS:                        11.9   11.88 )-----------( 259      ( 04 Oct 2018 07:58 )             37.5     CAPILLARY BLOOD GLUCOSE    PTT - ( 03 Oct 2018 17:29 )  PTT:40.8 sec    Procalcitonin, Serum: 0.10 ng/mL (10-02-18 @ 05:12)    CULTURES: (if applicable)  Culture Results:   Numerous Pseudomonas aeruginosa  Normal Respiratory Vijaya present (10-02 @ 02:18)    Most recent blood culture -- 10-02 @ 02:18   Pseudomonas aeruginosa Pseudomonas aeruginosa .Sputum Sputum 10-02 @ 02:18    Blood culture 10-02 @ 02:18    Physical Examination:  PULM: Rhonchi/crackles R base  CVS: S1, S2 heard    RADIOLOGY REVIEWED  CT chest: < from: CT Chest No Cont (10.03.18 @ 11:20) >  LUNGS AND LARGE AIRWAYS:     Interval obliteration of the segmental branches of the right lower lobe   bronchus likely secondary to retained secretions.    New dense consolidation in the right lower lobe. There are also right   lower lobe and right middle lobe tree-in-bud opacities, increased from   prior. These findings collectively represent bronchopneumonia of the   right lower lobe.    Tracheomegaly.  Surgical changes from prior left upper lobe lobectomy without evidence of   recurrence. Unchanged elevation of left hemidiaphragm.    PLEURA: Small new right parapneumonic pleural effusion. No pneumothorax.    VESSELS: Calcific atherosclerotic disease of the aorta. The pulmonary   artery is not enlarged. There is no aortic aneurysm. Blood pool in the   aorta is hypodense relative to the myocardium and vessel wall, suggestive   of anemia.    HEART: Heart size is normal. No pericardial effusion.    MEDIASTINUM AND ALEXANDRA: There is a 1 cm left lower paratracheal lymph   nodes, increased in size since the prior exam (series 4 image 316). There   is also a 6 mm right hilar lymph node.  These lymph nodes can be seen in   the context of infection. The esophagus is unremarkable.    CHEST WALL AND LOWER NECK: Limited evaluation of the lower neck secondary   to left shoulder orthopedic hardware metal artifact. However, within the   limits of this exam, the visualized lower neck and chest wall are within   normal limits.    VISUALIZED UPPER ABDOMEN: Hyperdense left renal lesion (series 4 image   848) stable since its abdomen pelvis 5/12/2015. The remainder of the   upper abdomen is unremarkable    BONES: Degenerative changes of the spine. Old healed fractures of the   right sixth through 10th ribs.    IMPRESSION:   1.  Right lower lung bronchopneumonia. Recommend follow-up imaging in 4   weeks after treatment to ensure resolution.  2.  Small right pleural effusion.    < end of copied text >

## 2018-10-04 NOTE — PROGRESS NOTE ADULT - SUBJECTIVE AND OBJECTIVE BOX
CARDIOLOGY FOLLOW UP - Dr. Holcomb    CC no cp/sob       PHYSICAL EXAM:  T(C): 36.9 (10-04-18 @ 04:28), Max: 37.1 (10-03-18 @ 21:29)  HR: 92 (10-04-18 @ 04:28) (72 - 100)  BP: 102/62 (10-04-18 @ 04:28) (92/55 - 102/62)  RR: 18 (10-04-18 @ 04:28) (18 - 18)  SpO2: 96% (10-04-18 @ 04:28) (93% - 97%)  Wt(kg): --  I&O's Summary    03 Oct 2018 07:01  -  04 Oct 2018 07:00  --------------------------------------------------------  IN: 990 mL / OUT: 1720 mL / NET: -730 mL    04 Oct 2018 07:01  -  04 Oct 2018 11:23  --------------------------------------------------------  IN: 315 mL / OUT: 300 mL / NET: 15 mL        Appearance: Normal	  Cardiovascular: Normal S1 S2,RRR, No JVD, No murmurs  Respiratory: Lungs clear to auscultation	  Gastrointestinal:  Soft, Non-tender, + BS	  Extremities: Normal range of motion, No clubbing, cyanosis or edema        MEDICATIONS  (STANDING):  ALBUTerol/ipratropium for Nebulization 3 milliLiter(s) Nebulizer every 6 hours  artificial tears (preservative free) Ophthalmic Solution 1 Drop(s) Both EYES daily  aspirin enteric coated 81 milliGRAM(s) Oral daily  buDESOnide 160 MICROgram(s)/formoterol 4.5 MICROgram(s) Inhaler 2 Puff(s) Inhalation two times a day  cefepime   IVPB      cefepime   IVPB 1000 milliGRAM(s) IV Intermittent every 12 hours  guaiFENesin ER 1200 milliGRAM(s) Oral every 12 hours  heparin  Injectable 5000 Unit(s) SubCutaneous every 12 hours  influenza   Vaccine 0.5 milliLiter(s) IntraMuscular once  levothyroxine 75 MICROGram(s) Oral daily  metoprolol tartrate 25 milliGRAM(s) Oral two times a day  senna 2 Tablet(s) Oral at bedtime  sodium chloride 0.9%. 1000 milliLiter(s) (50 mL/Hr) IV Continuous <Continuous>      TELEMETRY: SR 70-90, 2.4 secs PAT     ECG:  	  RADIOLOGY:   DIAGNOSTIC TESTING:  [ ] Echocardiogram:  [ ]  Catheterization:  [ ] Stress Test:    OTHER: 	    LABS:	 	                                11.9   11.88 )-----------( 259      ( 04 Oct 2018 07:58 )             37.5           PTT - ( 03 Oct 2018 17:29 )  PTT:40.8 sec

## 2018-10-04 NOTE — PHYSICAL THERAPY INITIAL EVALUATION ADULT - ADDITIONAL COMMENTS
84 year old female who PTA pt was living in an apartment with spouse elevator access and was independent in all functional mobility and ADL's. RW for gait.

## 2018-10-04 NOTE — PROGRESS NOTE ADULT - SUBJECTIVE AND OBJECTIVE BOX
Patient is a 84y old  Female who presents with a chief complaint of 'I feel like I was hit by a truck' (04 Oct 2018 10:16)      SUBJECTIVE / OVERNIGHT EVENTS:  feels much better. want to get oob. no nausea or vomiting.  last BM 5 days ago.    MEDICATIONS  (STANDING):  ALBUTerol/ipratropium for Nebulization 3 milliLiter(s) Nebulizer every 6 hours  artificial tears (preservative free) Ophthalmic Solution 1 Drop(s) Both EYES daily  aspirin enteric coated 81 milliGRAM(s) Oral daily  buDESOnide 160 MICROgram(s)/formoterol 4.5 MICROgram(s) Inhaler 2 Puff(s) Inhalation two times a day  cefepime   IVPB      cefepime   IVPB 1000 milliGRAM(s) IV Intermittent every 12 hours  guaiFENesin ER 1200 milliGRAM(s) Oral every 12 hours  heparin  Injectable 5000 Unit(s) SubCutaneous every 12 hours  influenza   Vaccine 0.5 milliLiter(s) IntraMuscular once  levothyroxine 75 MICROGram(s) Oral daily  metoprolol tartrate 25 milliGRAM(s) Oral two times a day  senna 2 Tablet(s) Oral at bedtime  sodium chloride 0.9%. 1000 milliLiter(s) (50 mL/Hr) IV Continuous <Continuous>    MEDICATIONS  (PRN):  acetaminophen   Tablet .. 500 milliGRAM(s) Oral every 6 hours PRN Temp greater or equal to 38C (100.4F), Moderate Pain (4 - 6)  ondansetron Injectable 4 milliGRAM(s) IV Push every 6 hours PRN Nausea and/or Vomiting      Vital Signs Last 24 Hrs  T(C): 36.9 (04 Oct 2018 04:28), Max: 37.1 (03 Oct 2018 21:29)  T(F): 98.4 (04 Oct 2018 04:28), Max: 98.7 (03 Oct 2018 21:29)  HR: 92 (04 Oct 2018 04:28) (72 - 100)  BP: 102/62 (04 Oct 2018 04:28) (92/55 - 110/72)  BP(mean): --  RR: 18 (04 Oct 2018 04:28) (18 - 18)  SpO2: 96% (04 Oct 2018 04:28) (93% - 97%)  CAPILLARY BLOOD GLUCOSE        I&O's Summary    03 Oct 2018 07:01  -  04 Oct 2018 07:00  --------------------------------------------------------  IN: 990 mL / OUT: 1720 mL / NET: -730 mL    04 Oct 2018 07:  -  04 Oct 2018 10:35  --------------------------------------------------------  IN: 315 mL / OUT: 300 mL / NET: 15 mL        PHYSICAL EXAM:  GENERAL: NAD, well-developed  HEAD:  Atraumatic, Normocephalic  EYES: EOMI, PERRLA, conjunctiva and sclera clear  NECK: Supple, No JVD  CHEST/LUNG: Clear to auscultation bilaterally; No wheeze, dec BS right base  HEART: Regular rate and rhythm; No murmurs, rubs, or gallops  ABDOMEN: Soft, Nontender, Nondistended; Bowel sounds present  EXTREMITIES:  2+ Peripheral Pulses, No clubbing, cyanosis, or edema  PSYCH: AAOx3  NEUROLOGY: non-focal  SKIN: No rashes or lesions    LABS:                        11.9   11.88 )-----------( 259      ( 04 Oct 2018 07:58 )             37.5           PTT - ( 03 Oct 2018 17:29 )  PTT:40.8 sec          RADIOLOGY & ADDITIONAL TESTS:    Imaging Personally Reviewed:    < from: CT Heart with Coronaries (10.03.18 @ 11:39) >    CALCIUM score: The calculated Agatston score is 626.  Left main: 47  Left anterior descendin  Left circumflex: 182  Right coronary: 0    CORONARY: Right coronary artery dominance. No evidence for anomalous   coronary arteries.    LEFT MAIN: Medium caliber short length vessel. Normal trifurcation into   LAD, LCX and ramus intermedius.  Calcified plaque in the left main   coronary artery causes less than 10% luminal narrowing. No significant   stenosis.    LEFT ANTERIOR DESCENDING: There are two medium caliberpatent diagonal   branches.  Distal vessel wraps around apex.      The calcified plaque in the proximal and mid segments the left anterior   descending coronary artery causes minimal luminal narrowing (less than   30%).     No significant stenosis.    LEFT CIRCUMFLEX: No significant stenosis or plaques. Three patent obtuse   marginal branches. The circumflex terminates as a third obtuse marginal   division.    Calcified plaque in the proximal and mid to distal segments of the left   circumflex coronary artery causes minimal luminal narrowing (less than   30%).    Calcified plaque within the proximal aspect of the second obtuse marginal   division causes minimal luminal narrowing (less than 30%).     No significant stenosis.    RAMUS INTERMEDIUS: Medium caliber division supplying the anterolateral   wall. No significant stenosis.    RIGHT CORONARY ARTERY: Gives off PDA, PLV branches. Noncalcified plaque   in the proximal and mid segments of the right coronary artery causes   minimal luminal narrowing (less than 30%).     No significant stenosis.    CARDIAC FUNCTION: Qualitatively normal resting global left ventricular   function.  Normal thickening.  Ejection fraction: 58%  End-diastolic volume: 79 cc  End-systolic findings: 33 cc  Stroke volume 46 cc  Cardiac output: 3.2 L/m    MORPHOLOGY: Normal LV size and shape.  Normal end diastolic left   ventricular wall thickness.  Normal RV size and shape.    VALVES: Trileaflet aortic valve. There is incomplete coaptation of the   aortic valve during diastole which can suggest underlying aortic   insufficiency. The anterior and posterior leaflets of the mitral valve   are qualitatively thickened.    PERICARDIUM: Normal pericardial contour. No pericardial effusion.    NONCARDIAC FINDINGS: The tree-in-bud nodules, emphysema, secretions in   the right lower lobe segmental bronchi and patchy right lower lobe   consolidation are partially imaged. These are better evaluated on the CT   of the chest. The left diaphragm is elevated. The visualized bony   structures show degenerative change.      IMPRESSION:    1.  Coronary artery disease.  2.  The calculated Agatston score is 626.  3.  No significant stenosis.  4.  Minimal luminal narrowing of the left anterior descending, left   circumflex, and right coronary arteries as reported above.      < end of copied text >    < from: CT Chest No Cont (10.03.18 @ 11:20) >  IMPRESSION:   1.  Right lower lung bronchopneumonia. Recommend follow-up imaging in 4   weeks after treatment to ensure resolution.  2.  Small right pleural effusion.      < end of copied text >  < from: TTE with Doppler (w/Cont) (18 @ 11:26) >  Conclusions:  1. Mitral annular calcification, otherwise normal mitral  valve. Minimal mitral regurgitation.  2. Aortic valve not well visualized; appears calcified.  Minimal aortic regurgitation.  3. Endocardial visualization enhanced with intravenous  injection of Ultrasonic Enhancing Agent (Definity).  Moderate segmental left ventricular systolic dysfunction.  EF approximately 40%. The mid inferolateral, mid lateral,  mid anterior, distal segments and the apical cap are  akinetic. The basal segments are hyperkinetic. No left  ventricular thrombus.  4. Mild diastolic dysfunction (Stage I).  5. The right ventricle is not well visualized; grossly  normal right ventricular size and systolic function.  6. Right pleural effusion.  Results discussed with cardiology fellow, Dr. Hodges.  *** Compared with echocardiogram of 2013, moderate LV  dysfunction with new wall motion abnormalities are seen on  today's study.    < end of copied text >    Consultant(s) Notes Reviewed:      Care Discussed with Consultants/Other Providers:

## 2018-10-04 NOTE — PROGRESS NOTE ADULT - PROBLEM SELECTOR PLAN 4
Elevated CE with new segmental LV dysfunction on TTE  -CT coronaries-no significant stenosis  -No further w/u per cards

## 2018-10-04 NOTE — PROGRESS NOTE ADULT - ASSESSMENT
83yo F pmh COPD, lung cancer s/p lobectomy 13 years ago, PVD, hypothyroid, spinal stenosis recent URI treated with abx p/w fatigue, found to have elevated troponin, JASMIN in 1 lead, and TTE with wall motion abnormalities, concerning for late presentation MI vs NSTEMI vs myocarditis. She also has evidence of pneumonia on CT chest with recent URI symptoms and is positive for Rhinovirus/Enterovirus. Sputum cultures positive for mod. pseudomonas. Recent abx use is azithro and doxy, has intolerance not a true allergy to quinolones. She has tolerated cipro in the past and her documented "allergy" was stomach upset and jitteriness which was likely from steroids that she was on at the same time.   Overall Sepsis (tachycardia, tachypnea, hypoxia) due to pseudomonas pneumonia superimposed on viral infection. Leucocytosis.       Suggest:  -continue Cefepime 1g q12hrs (based on creatinine clearance) likely for 7-8 days while inpatient  -upon discharge can transition to PO Cipro 500mg q24hr to complete 7 days of treatment   -trend WBC and fever curve 85yo F pmh COPD, lung cancer s/p lobectomy 13 years ago, PVD, hypothyroid, spinal stenosis recent URI treated with abx p/w fatigue, found to have elevated troponin, JASMIN in 1 lead, and TTE with wall motion abnormalities, concerning for late presentation MI vs NSTEMI vs myocarditis. She also has evidence of pneumonia on CT chest with recent URI symptoms and is positive for Rhinovirus/Enterovirus. Sputum cultures positive for mod. pseudomonas. Recent abx use is azithro and doxy, has intolerance not a true allergy to quinolones. She has tolerated cipro in the past and her documented "allergy" was stomach upset and jitteriness which was likely from steroids that she was on at the same time.   Overall Sepsis (tachycardia, tachypnea, hypoxia) due to pseudomonas pneumonia superimposed on viral infection. Leucocytosis.   Resolved sepsis. clinically improved.     Suggest:  -continue Cefepime 1g q12hrs (based on creatinine clearance) likely for 7-8 days while inpatient  -upon discharge can transition to PO Cipro 500mg q24hr to complete treatment   -trend WBC and fever curve  -Taper oxygen as tolerated.

## 2018-10-04 NOTE — PROGRESS NOTE ADULT - SUBJECTIVE AND OBJECTIVE BOX
Follow Up: 83 yo F admitted for Pseudomonas Pneumonia.       Interval History/ROS: Pt seen and examined. She reports feeling much better. Cough is present with less intrusive and denies SOB. Tolerating PO and nausea has essentially disappereared as well.   Denies HA, CP, palpitations, N/V/D/C/Dysuria.     Allergies  Levaquin (Unknown)  predniSONE (Unknown)  tetanus immune globulin (Rash)  tetnus (Rash)        ANTIMICROBIALS:  cefepime   IVPB    cefepime   IVPB 1000 every 12 hours      OTHER MEDS:  MEDICATIONS  (STANDING):  acetaminophen   Tablet .. 500 every 6 hours PRN  ALBUTerol/ipratropium for Nebulization 3 every 6 hours  aspirin enteric coated 81 daily  buDESOnide 160 MICROgram(s)/formoterol 4.5 MICROgram(s) Inhaler 2 two times a day  guaiFENesin ER 1200 every 12 hours  heparin  Injectable 5000 every 12 hours  influenza   Vaccine 0.5 once  levothyroxine 75 daily  metoprolol tartrate 25 two times a day  ondansetron Injectable 4 every 6 hours PRN  senna 2 at bedtime      Vital Signs Last 24 Hrs  T(C): 36.9 (04 Oct 2018 04:28), Max: 37.1 (03 Oct 2018 21:29)  T(F): 98.4 (04 Oct 2018 04:28), Max: 98.7 (03 Oct 2018 21:29)  HR: 92 (04 Oct 2018 04:28) (72 - 100)  BP: 102/62 (04 Oct 2018 04:28) (92/55 - 114/66)  BP(mean): --  RR: 18 (04 Oct 2018 04:28) (18 - 18)  SpO2: 96% (04 Oct 2018 04:28) (93% - 97%)    PHYSICAL EXAM:  General: non-toxic, appears comfortable and speaking in full sentences   HEAD/EYES: anicteric, PERRL  ENT:  supple  Cardiovascular:   S1, S2  Respiratory:  decreased breath sounds and RLL rales are still present but slightly improved   GI:  soft, non-tender, normal bowel sounds  :  no CVA tenderness   Musculoskeletal:  no synovitis  Neurologic:  grossly non-focal  Skin:  no rash  Lymph: no lymphadenopathy  Psychiatric:  appropriate affect  Vascular:  no phlebitis                          11.9   11.88 )-----------( 259      ( 04 Oct 2018 07:58 )             37.5         MICROBIOLOGY:  Legionella pneumophila Antigen, Urine (10.02.18 @ 08:01)    Legionella Antigen, Urine: Negative    Culture - Sputum . (10.02.18 @ 02:18)    Gram Stain:   Numerous polymorphonuclear leukocytes  Few Squamous epithelial cells per low power field  Numerous Gram Negative Rods  Numerous Gram positive cocci in pairs, chains and clusters per oil power  field    -  Amikacin: S <=8    -  Aztreonam: S <=4    -  Cefepime: S <=2    -  Ceftazidime: S <=1    -  Ciprofloxacin: S <=0.5    -  Gentamicin: S <=1    -  Imipenem: S 2    -  Levofloxacin: S <=1    -  Meropenem: S <=1    -  Piperacillin/Tazobactam: S <=8    -  Tobramycin: S <=2    Specimen Source: .Sputum Sputum    Culture Results:   Numerous Pseudomonas aeruginosa  Normal Respiratory Vijaya present    Organism Identification: Pseudomonas aeruginosa    Organism: Pseudomonas aeruginosa    Method Type: COREEN    Rapid RVP Result: Detected (09-30 @ 22:43)        RADIOLOGY:  < from: CT Chest No Cont (10.03.18 @ 11:20) >  IMPRESSION:   1.  Right lower lung bronchopneumonia. Recommend follow-up imaging in 4   weeks after treatment to ensure resolution.  2.  Small right pleural effusion.    < end of copied text > Follow Up: 83 yo F admitted for Pseudomonas Pneumonia.       Interval History/ROS: Pt seen and examined. She reports feeling much better. Cough is present with less intrusive and denies SOB. Tolerating PO and nausea has essentially disappereared as well.   Denies HA, CP, palpitations, N/V/D/C/Dysuria.     Allergies  Levaquin (Unknown)  predniSONE (Unknown)  tetanus immune globulin (Rash)  tetnus (Rash)        ANTIMICROBIALS:  cefepime   IVPB    cefepime   IVPB 1000 every 12 hours      OTHER MEDS:  MEDICATIONS  (STANDING):  acetaminophen   Tablet .. 500 every 6 hours PRN  ALBUTerol/ipratropium for Nebulization 3 every 6 hours  aspirin enteric coated 81 daily  buDESOnide 160 MICROgram(s)/formoterol 4.5 MICROgram(s) Inhaler 2 two times a day  guaiFENesin ER 1200 every 12 hours  heparin  Injectable 5000 every 12 hours  influenza   Vaccine 0.5 once  levothyroxine 75 daily  metoprolol tartrate 25 two times a day  ondansetron Injectable 4 every 6 hours PRN  senna 2 at bedtime      Vital Signs Last 24 Hrs  T(C): 36.9 (04 Oct 2018 04:28), Max: 37.1 (03 Oct 2018 21:29)  T(F): 98.4 (04 Oct 2018 04:28), Max: 98.7 (03 Oct 2018 21:29)  HR: 92 (04 Oct 2018 04:28) (72 - 100)  BP: 102/62 (04 Oct 2018 04:28) (92/55 - 114/66)  RR: 18 (04 Oct 2018 04:28) (18 - 18)  SpO2: 96% (04 Oct 2018 04:28) (93% - 97%)      PHYSICAL EXAM:  General: non-toxic, appears comfortable and speaking in full sentences   No thrush   Cardiovascular:   S1, S2, + murmur.   Respiratory:  decreased breath sounds and RLL rales are still present but slightly improved   GI:  soft, non-tender, normal bowel sounds  Skin:  no rash  Vascular:  no phlebitis                          11.9   11.88 )-----------( 259      ( 04 Oct 2018 07:58 )             37.5         MICROBIOLOGY:  Legionella pneumophila Antigen, Urine (10.02.18 @ 08:01)    Legionella Antigen, Urine: Negative    Culture - Sputum . (10.02.18 @ 02:18)    Gram Stain:   Numerous polymorphonuclear leukocytes  Few Squamous epithelial cells per low power field  Numerous Gram Negative Rods  Numerous Gram positive cocci in pairs, chains and clusters per oil power  field    -  Amikacin: S <=8    -  Aztreonam: S <=4    -  Cefepime: S <=2    -  Ceftazidime: S <=1    -  Ciprofloxacin: S <=0.5    -  Gentamicin: S <=1    -  Imipenem: S 2    -  Levofloxacin: S <=1    -  Meropenem: S <=1    -  Piperacillin/Tazobactam: S <=8    -  Tobramycin: S <=2    Specimen Source: .Sputum Sputum    Culture Results:   Numerous Pseudomonas aeruginosa  Normal Respiratory Vijaya present    Organism Identification: Pseudomonas aeruginosa    Organism: Pseudomonas aeruginosa    Method Type: COREEN    Rapid RVP Result: Detected (09-30 @ 22:43)        RADIOLOGY:  < from: CT Chest No Cont (10.03.18 @ 11:20) >  IMPRESSION:   1.  Right lower lung bronchopneumonia. Recommend follow-up imaging in 4   weeks after treatment to ensure resolution.  2.  Small right pleural effusion.

## 2018-10-04 NOTE — PHYSICAL THERAPY INITIAL EVALUATION ADULT - ASR WT BEARING STATUS EVAL
9/30: HsTrop x 2 100/102, 10/3: CT Coronaries -   Coronary artery disease. The calculated Agatston score is 626. No significant stenosis. Minimal luminal narrowing of the left anterior 	descending, left circumflex, and right coronary arteries as reported above.CT Chest - RLL bronchopneumonia, needs repeat imaging in 4 weeks to ensure resolution/no weight-bearing restrictions

## 2018-10-04 NOTE — PHYSICAL THERAPY INITIAL EVALUATION ADULT - PERTINENT HX OF CURRENT PROBLEM, REHAB EVAL
84F PMH PVD, HLD, COPD, lung ca presenting with cough, low grade fevers and weakness for the past week. She had been seen by her PCP Dr. Negrete who has been prescribing her PO antibiotics. Despite the medication she has continued to feel weak w/ continued cough and shortness of breath.

## 2018-10-04 NOTE — PROGRESS NOTE ADULT - ASSESSMENT
85yo F pmh COPD, lung cancer s/p lobectomy 13 years ago, PVD, hypothyroid, spinal stenosis recent URI treated with abx p/w fatigue, found to have elevated troponin, JASMIN in 1 lead, and TTE with wall motion abnormalities, concerning for late presentation MI vs NSTEMI vs myocarditis, on heparin gtt/ACS protocol plan for cath this week by cardiology.      NSTEMI (non-ST elevated myocardial infarction).    - atypical presentation, no cp/sob, +weakness   - found to have elevated trop as well as abnl ECG with one isolated elevated st segment   - echo with new segmental lv dysfxn  - clinically pt without hf on exam, no chest pain, cough  - echo findings c/w an acute nonischemic, likely stress induced cmp in light of multi territory wall motion abnl seen   - continue asa, plavix, iv heparin for now  -  CT coronaries  done  - cont bb  - plan as per cardiology     Weaknes secondary to PNA  -Sepsis (tachycardia, tachypnea, hypoxia) due to pseudomonas pneumonia superimposed on viral infection. Leucocytosis.   -continue Cefepime 1g q12hrs (based on creatinine clearance) likely for 7-8 days while inpatient  -upon discharge can transition to PO Cipro 500mg q24hr to complete 7 days of treatment   -trend WBC and fever curve    Chronic obstructive pulmonary disease   - no  acute exacerbation  - continue home advair equivalent.   - duonebs  - mucinex  - pt was seen by dr simons    PVD (peripheral vascular disease).    - no home meds.  Has h/o stents 2004    Hypothyroidism,  - continue home synthroid.     DVT px  - on hep gtt acs protocol    PT eval 83yo F pmh COPD, lung cancer s/p lobectomy 13 years ago, PVD, hypothyroid, spinal stenosis recent URI treated with abx p/w fatigue, found to have elevated troponin, JASMIN in 1 lead, and TTE with wall motion abnormalities, concerning for late presentation MI vs NSTEMI vs myocarditis, on heparin gtt/ACS protocol plan for cath this week by cardiology.      NSTEMI (non-ST elevated myocardial infarction).    - atypical presentation, no cp/sob, +weakness   - found to have elevated trop as well as abnl ECG with one isolated elevated st segment   - echo with new segmental lv dysfxn  - clinically pt without hf on exam, no chest pain, cough  - echo findings c/w an acute nonischemic, likely stress induced cmp in light of multi territory wall motion abnl seen   - continue asa, plavix, iv heparin for now  -  CT coronaries  done  - cont bb  - plan as per cardiology     Weaknes secondary to PNA  -Sepsis (tachycardia, tachypnea, hypoxia) due to pseudomonas pneumonia superimposed on viral infection. Leucocytosis.   -continue Cefepime 1g q12hrs (based on creatinine clearance) likely for 7-8 days while inpatient  -upon discharge can transition to PO Cipro 500mg q24hr to complete 7 days of treatment   -trend WBC and fever curve    Chronic obstructive pulmonary disease   - no  acute exacerbation  - continue home advair equivalent.   - duonebs  - mucinex  - pt was seen by dr simons    PVD (peripheral vascular disease).    - no home meds.  Has h/o stents 2004    Hypothyroidism,  - continue home synthroid.     DVT px  - heparin sq    PT eval

## 2018-10-04 NOTE — PHYSICAL THERAPY INITIAL EVALUATION ADULT - PRECAUTIONS/LIMITATIONS, REHAB EVAL
cardiac precautions/fall precautions/Dx: RVP +rhino-enterovirus, RLL pseudomonas PNA on IV Cefepime started 10/3 NSTEMI vs myocarditis on heparin gtt/ACS protocol

## 2018-10-04 NOTE — PROGRESS NOTE ADULT - PROBLEM SELECTOR PLAN 1
Superimposed RLL bacterial PNA  -Pan sensitive pseudomonas on sputum culture  -Appreciate ID consult-c/w Cefepime, change to Cipro upon discharge  -Patient is still hypoxic, attempt to wean off supplemental oxygen  -Prevnar 23 vaccine

## 2018-10-05 ENCOUNTER — TRANSCRIPTION ENCOUNTER (OUTPATIENT)
Age: 83
End: 2018-10-05

## 2018-10-05 VITALS
HEART RATE: 98 BPM | OXYGEN SATURATION: 97 % | SYSTOLIC BLOOD PRESSURE: 114 MMHG | DIASTOLIC BLOOD PRESSURE: 72 MMHG | RESPIRATION RATE: 18 BRPM

## 2018-10-05 DIAGNOSIS — R09.02 HYPOXEMIA: ICD-10-CM

## 2018-10-05 LAB
ANION GAP SERPL CALC-SCNC: 8 MMOL/L — SIGNIFICANT CHANGE UP (ref 5–17)
BUN SERPL-MCNC: 23 MG/DL — SIGNIFICANT CHANGE UP (ref 7–23)
CALCIUM SERPL-MCNC: 9.3 MG/DL — SIGNIFICANT CHANGE UP (ref 8.4–10.5)
CHLORIDE SERPL-SCNC: 102 MMOL/L — SIGNIFICANT CHANGE UP (ref 96–108)
CO2 SERPL-SCNC: 29 MMOL/L — SIGNIFICANT CHANGE UP (ref 22–31)
CREAT SERPL-MCNC: 1.06 MG/DL — SIGNIFICANT CHANGE UP (ref 0.5–1.3)
GLUCOSE SERPL-MCNC: 104 MG/DL — HIGH (ref 70–99)
HCT VFR BLD CALC: 37.1 % — SIGNIFICANT CHANGE UP (ref 34.5–45)
HGB BLD-MCNC: 11.3 G/DL — LOW (ref 11.5–15.5)
MCHC RBC-ENTMCNC: 30.5 GM/DL — LOW (ref 32–36)
MCHC RBC-ENTMCNC: 31.2 PG — SIGNIFICANT CHANGE UP (ref 27–34)
MCV RBC AUTO: 102.5 FL — HIGH (ref 80–100)
PLATELET # BLD AUTO: 285 K/UL — SIGNIFICANT CHANGE UP (ref 150–400)
POTASSIUM SERPL-MCNC: 4.7 MMOL/L — SIGNIFICANT CHANGE UP (ref 3.5–5.3)
POTASSIUM SERPL-SCNC: 4.7 MMOL/L — SIGNIFICANT CHANGE UP (ref 3.5–5.3)
RBC # BLD: 3.62 M/UL — LOW (ref 3.8–5.2)
RBC # FLD: 13.8 % — SIGNIFICANT CHANGE UP (ref 10.3–14.5)
SODIUM SERPL-SCNC: 139 MMOL/L — SIGNIFICANT CHANGE UP (ref 135–145)
WBC # BLD: 9.33 K/UL — SIGNIFICANT CHANGE UP (ref 3.8–10.5)
WBC # FLD AUTO: 9.33 K/UL — SIGNIFICANT CHANGE UP (ref 3.8–10.5)

## 2018-10-05 PROCEDURE — 71250 CT THORAX DX C-: CPT

## 2018-10-05 PROCEDURE — 87798 DETECT AGENT NOS DNA AMP: CPT

## 2018-10-05 PROCEDURE — 87449 NOS EACH ORGANISM AG IA: CPT

## 2018-10-05 PROCEDURE — 85610 PROTHROMBIN TIME: CPT

## 2018-10-05 PROCEDURE — 87186 SC STD MICRODIL/AGAR DIL: CPT

## 2018-10-05 PROCEDURE — 84145 PROCALCITONIN (PCT): CPT

## 2018-10-05 PROCEDURE — 80053 COMPREHEN METABOLIC PANEL: CPT

## 2018-10-05 PROCEDURE — 97162 PT EVAL MOD COMPLEX 30 MIN: CPT

## 2018-10-05 PROCEDURE — 83735 ASSAY OF MAGNESIUM: CPT

## 2018-10-05 PROCEDURE — 87633 RESP VIRUS 12-25 TARGETS: CPT

## 2018-10-05 PROCEDURE — 84436 ASSAY OF TOTAL THYROXINE: CPT

## 2018-10-05 PROCEDURE — 84484 ASSAY OF TROPONIN QUANT: CPT

## 2018-10-05 PROCEDURE — 71045 X-RAY EXAM CHEST 1 VIEW: CPT

## 2018-10-05 PROCEDURE — 82746 ASSAY OF FOLIC ACID SERUM: CPT

## 2018-10-05 PROCEDURE — 99232 SBSQ HOSP IP/OBS MODERATE 35: CPT

## 2018-10-05 PROCEDURE — 82607 VITAMIN B-12: CPT

## 2018-10-05 PROCEDURE — 82962 GLUCOSE BLOOD TEST: CPT

## 2018-10-05 PROCEDURE — 84443 ASSAY THYROID STIM HORMONE: CPT

## 2018-10-05 PROCEDURE — 94640 AIRWAY INHALATION TREATMENT: CPT

## 2018-10-05 PROCEDURE — 87581 M.PNEUMON DNA AMP PROBE: CPT

## 2018-10-05 PROCEDURE — 99285 EMERGENCY DEPT VISIT HI MDM: CPT

## 2018-10-05 PROCEDURE — 87070 CULTURE OTHR SPECIMN AEROBIC: CPT

## 2018-10-05 PROCEDURE — 85730 THROMBOPLASTIN TIME PARTIAL: CPT

## 2018-10-05 PROCEDURE — 93005 ELECTROCARDIOGRAM TRACING: CPT

## 2018-10-05 PROCEDURE — 84480 ASSAY TRIIODOTHYRONINE (T3): CPT

## 2018-10-05 PROCEDURE — 82550 ASSAY OF CK (CPK): CPT

## 2018-10-05 PROCEDURE — 75574 CT ANGIO HRT W/3D IMAGE: CPT

## 2018-10-05 PROCEDURE — 87486 CHLMYD PNEUM DNA AMP PROBE: CPT

## 2018-10-05 PROCEDURE — C8929: CPT

## 2018-10-05 PROCEDURE — 82553 CREATINE MB FRACTION: CPT

## 2018-10-05 PROCEDURE — 84100 ASSAY OF PHOSPHORUS: CPT

## 2018-10-05 PROCEDURE — 85027 COMPLETE CBC AUTOMATED: CPT

## 2018-10-05 PROCEDURE — 80048 BASIC METABOLIC PNL TOTAL CA: CPT

## 2018-10-05 RX ORDER — CIPROFLOXACIN LACTATE 400MG/40ML
1 VIAL (ML) INTRAVENOUS
Qty: 6 | Refills: 0 | OUTPATIENT
Start: 2018-10-05 | End: 2018-10-10

## 2018-10-05 RX ORDER — CIPROFLOXACIN LACTATE 400MG/40ML
500 VIAL (ML) INTRAVENOUS DAILY
Qty: 0 | Refills: 0 | Status: DISCONTINUED | OUTPATIENT
Start: 2018-10-05 | End: 2018-10-05

## 2018-10-05 RX ORDER — METOPROLOL TARTRATE 50 MG
12.5 TABLET ORAL
Qty: 0 | Refills: 0 | Status: DISCONTINUED | OUTPATIENT
Start: 2018-10-05 | End: 2018-10-05

## 2018-10-05 RX ORDER — METOPROLOL TARTRATE 50 MG
0.5 TABLET ORAL
Qty: 30 | Refills: 0 | OUTPATIENT
Start: 2018-10-05 | End: 2018-11-03

## 2018-10-05 RX ORDER — SENNA PLUS 8.6 MG/1
2 TABLET ORAL
Qty: 0 | Refills: 0 | COMMUNITY
Start: 2018-10-05

## 2018-10-05 RX ORDER — SENNA PLUS 8.6 MG/1
6 TABLET ORAL
Qty: 0 | Refills: 0 | COMMUNITY

## 2018-10-05 RX ORDER — ASPIRIN/CALCIUM CARB/MAGNESIUM 324 MG
1 TABLET ORAL
Qty: 0 | Refills: 0 | COMMUNITY
Start: 2018-10-05

## 2018-10-05 RX ADMIN — Medication 3 MILLILITER(S): at 17:05

## 2018-10-05 RX ADMIN — BUDESONIDE AND FORMOTEROL FUMARATE DIHYDRATE 2 PUFF(S): 160; 4.5 AEROSOL RESPIRATORY (INHALATION) at 17:05

## 2018-10-05 RX ADMIN — CEFEPIME 100 MILLIGRAM(S): 1 INJECTION, POWDER, FOR SOLUTION INTRAMUSCULAR; INTRAVENOUS at 05:40

## 2018-10-05 RX ADMIN — HEPARIN SODIUM 5000 UNIT(S): 5000 INJECTION INTRAVENOUS; SUBCUTANEOUS at 05:39

## 2018-10-05 RX ADMIN — Medication 3 MILLILITER(S): at 11:11

## 2018-10-05 RX ADMIN — Medication 500 MILLIGRAM(S): at 11:11

## 2018-10-05 RX ADMIN — Medication 12.5 MILLIGRAM(S): at 17:08

## 2018-10-05 RX ADMIN — Medication 3 MILLILITER(S): at 05:37

## 2018-10-05 RX ADMIN — Medication 1200 MILLIGRAM(S): at 05:38

## 2018-10-05 RX ADMIN — BUDESONIDE AND FORMOTEROL FUMARATE DIHYDRATE 2 PUFF(S): 160; 4.5 AEROSOL RESPIRATORY (INHALATION) at 05:37

## 2018-10-05 RX ADMIN — Medication 75 MICROGRAM(S): at 05:38

## 2018-10-05 RX ADMIN — Medication 25 MILLIGRAM(S): at 05:38

## 2018-10-05 RX ADMIN — Medication 1200 MILLIGRAM(S): at 17:05

## 2018-10-05 RX ADMIN — Medication 81 MILLIGRAM(S): at 11:11

## 2018-10-05 NOTE — CHART NOTE - NSCHARTNOTEFT_GEN_A_CORE
Patient requires supplemental oxygen for discharge home today, 2L NC with exertion.   COPD is chronic and stable   PNA is resolved  -f/u with Dr. William (pulmonary) on 10/15.

## 2018-10-05 NOTE — PROGRESS NOTE ADULT - ATTENDING COMMENTS
Andrez Latham  Pager: [phone]. If no response or past 5 pm call [phone].    Please call ID service for questions over weekend at [phone].
Agree with above NP note.  CV status stable  awaiting CT of the coronaries  cont current medical mgmt   if no sig disease, will d/c plavix, hep and treat for acute nicmp  cont bb
Agree with above NP note.  cv stable  acute systolic HF/nonischemic cmp/nstemi in setting of pulmo infection  clinically improved  no obs cad on CTA  abx   low dose bb  repeat echo as outpt
Patient seen and examined, agree with the above assessment and plan by STEFANI Spangler.  CT Coronaries results noted, no signficant CAD, LVEF is normal  Can DC heparin, cont asa only  decrease metoprolol to 12.5mg BID  ABX for PNA  please decrease frequency of blood draws to Q48hrs  d/w pt and family
Agree with above NP note.  acute nonischemic cmp, no obstructive cad on CTA  cont bb, dec to 12.5 if sbp drops further  no clinical chf  asa  pulmo f/u for pna  abx
Andrez Latham  Pager: [phone]. If no response or past 5 pm call [phone].
as above
as above  cont abx  f/u cards recs
as above

## 2018-10-05 NOTE — PROGRESS NOTE ADULT - SUBJECTIVE AND OBJECTIVE BOX
84y old  Female who presents with a chief complaint of 'I feel like I was hit by a truck' (05 Oct 2018 13:09)      Interval history:  Afebrile, feels much better, continues to feel more energetic, still with cough. Resolved nausea.       Allergies:   Levaquin (Nausea)  predniSONE (Unknown)  tetanus immune globulin (Rash)  tetnus (Rash)      Antimicrobials:  ciprofloxacin     Tablet 500 milliGRAM(s) Oral daily      REVIEW OF SYSTEMS:  No chest pain  No diarrhea, no abdominal pain  No dysuria or frequency  No rash.     Vital Signs Last 24 Hrs  T(C): 36.7 (10-05-18 @ 13:54), Max: 36.9 (10-05-18 @ 04:26)  T(F): 98 (10-05-18 @ 13:54), Max: 98.4 (10-05-18 @ 04:26)  HR: 90 (10-05-18 @ 13:54) (88 - 101)  BP: 102/55 (10-05-18 @ 13:54) (94/55 - 104/60)  RR: 18 (10-05-18 @ 13:54) (18 - 18)  SpO2: 99% (10-05-18 @ 13:54) (95% - 99%)      PHYSICAL EXAM:  General: non-toxic, appears comfortable and speaking in full sentences   No thrush   Cardiovascular:   S1, S2, + murmur.   Respiratory:  decreased breath sounds and RLL rales  GI:  soft, non-tender, normal bowel sounds  Skin:  ecchymosis b/l upper extremities.   Vascular:  no phlebitis                          11.3   9.33  )-----------( 285      ( 05 Oct 2018 08:13 )             37.1   10-05    139  |  102  |  23  ----------------------------<  104<H>  4.7   |  29  |  1.06    Ca    9.3      05 Oct 2018 06:30

## 2018-10-05 NOTE — DISCHARGE NOTE ADULT - PLAN OF CARE
Improvement of symptoms Follow up with Dr William in one week, call to make an appointment.  Pneumonia is a lung infection that can cause a fever, cough, and trouble breathing.  Continue all antibiotics as ordered until complete.  Nutrition is important, eat small frequent meals.  Get lots of rest and drink fluids.  Call your health care provider upon arrival home from hospital and make a follow up appointment for one week.  If your cough worsens, you develop fever greater than 101', you have shaking chills, a fast heartbeat, trouble breathing and/or feel your are breathing much faster than usual, call your healthcare provider.  Make sure you wash your hands frequently. Call your Health Care provider upon arrival home to make a follow up appointment within one week.  Take all inhalers as prescribed by your Health Care Provider.  Take steroids as prescribed by your Health Care Provider.  If your cough increases infrequency and severity and/or you have shortness of breath or increased shortness of breath call your Health Care Provider.  If you develop fever, chills, night sweats, malaise, and/or change in mental status call your Health care Provider.  Nutrition is very important.  Eat small frequent meals.  Increase your activity as tolerated.  Do not stay in bed all day  Pt. verbalized an understanding of all instructions. Call your doctor if you have unusual chest pain, pressure, or discomfort, shortness of breath, nausea, vomiting, burping, heartburn, tingling upper body parts, sweating, cold, clammy sking, racing heartbeat  Call 911 if you think you are having a heart attack  Take all cardiac medications as prescribed - notify your doctor if you have any side effects  Follow cardiac diet - avoid fatty & fried foods, don't eat too much red meat, eat lots of fruits & vegetables, dairy products should be low fat  Lose weight if you are overweight  Become more active with walking, gardening, or any other activity that gets you to move Weigh yourself daily.  If you gain 3lbs in 3 days, or 5lbs in a week call your Health Care Provider.  Do not eat or drink foods containing more than 2000mg of salt (sodium) in your diet every day.  Call your Health Care Provider if you have any swelling or increased swelling in your feet, ankles, and/or stomach.  The Pt was provided with CHF diet instruction (low sodium diet, daily weights, label reading, Heart Healthy Cooking Tips & Heart Healthy shopping Tips).  Take all of your medication as directed.  If you become dizzy call your Health Care Provider.

## 2018-10-05 NOTE — DISCHARGE NOTE ADULT - HOSPITAL COURSE
84 year old female PMH of COPD, lung cancer s/p lobectomy 13 years ago, PVD, hypothyroid, spinal stenosis presents feeling like she's 'been hit by a truck.'  She was in her usual state of health until 1 week PTA when she noted URI symptoms of cough/sneeze/dyspnea.  She went to an urgent care, was swabbed for flu which was negative, given 1 week of Azithromycin which she finished on Thursday PTA.  She felt no improvement and went to her PCP who prescribed doxycycline. She woke up on Sunday feeling as if she had been "hit by a truck" and came to ER. RVP +rhino-enterovirus. EKG changes and elevated troponin-concern for ACS. Found to have an NSTEMI on admission, CT Heart with coronaries showed no significant CAD.  No further cardiac recommendations were made.  CT Chest shows RLL pneumonia, +sputum for pseudomonas.  Treated with course of IV Cefepime, will complete Cipro course outpatient.  Will require oxygen on discharge, 2LNC.  Evaluated by PT found to have no needs.  Patient stable for discharge to home.  Follow up with Dr William and Dr Negrete.

## 2018-10-05 NOTE — DISCHARGE NOTE ADULT - ADDITIONAL INSTRUCTIONS
Follow up with Dr William in one week, call to make an appointment.  Follow up with Dr Negrete in one week, call to make an appointment.

## 2018-10-05 NOTE — DISCHARGE NOTE ADULT - CARE PLAN
Principal Discharge DX:	Pneumonia of right lower lobe due to Pseudomonas species  Goal:	Improvement of symptoms  Assessment and plan of treatment:	Follow up with Dr William in one week, call to make an appointment.  Pneumonia is a lung infection that can cause a fever, cough, and trouble breathing.  Continue all antibiotics as ordered until complete.  Nutrition is important, eat small frequent meals.  Get lots of rest and drink fluids.  Call your health care provider upon arrival home from hospital and make a follow up appointment for one week.  If your cough worsens, you develop fever greater than 101', you have shaking chills, a fast heartbeat, trouble breathing and/or feel your are breathing much faster than usual, call your healthcare provider.  Make sure you wash your hands frequently.  Secondary Diagnosis:	COPD (chronic obstructive pulmonary disease)  Assessment and plan of treatment:	Call your Health Care provider upon arrival home to make a follow up appointment within one week.  Take all inhalers as prescribed by your Health Care Provider.  Take steroids as prescribed by your Health Care Provider.  If your cough increases infrequency and severity and/or you have shortness of breath or increased shortness of breath call your Health Care Provider.  If you develop fever, chills, night sweats, malaise, and/or change in mental status call your Health care Provider.  Nutrition is very important.  Eat small frequent meals.  Increase your activity as tolerated.  Do not stay in bed all day  Pt. verbalized an understanding of all instructions.  Secondary Diagnosis:	NSTEMI (non-ST elevated myocardial infarction)  Assessment and plan of treatment:	Call your doctor if you have unusual chest pain, pressure, or discomfort, shortness of breath, nausea, vomiting, burping, heartburn, tingling upper body parts, sweating, cold, clammy sking, racing heartbeat  Call 911 if you think you are having a heart attack  Take all cardiac medications as prescribed - notify your doctor if you have any side effects  Follow cardiac diet - avoid fatty & fried foods, don't eat too much red meat, eat lots of fruits & vegetables, dairy products should be low fat  Lose weight if you are overweight  Become more active with walking, gardening, or any other activity that gets you to move  Secondary Diagnosis:	CHF (congestive heart failure), NYHA class I, acute, systolic  Assessment and plan of treatment:	Weigh yourself daily.  If you gain 3lbs in 3 days, or 5lbs in a week call your Health Care Provider.  Do not eat or drink foods containing more than 2000mg of salt (sodium) in your diet every day.  Call your Health Care Provider if you have any swelling or increased swelling in your feet, ankles, and/or stomach.  The Pt was provided with CHF diet instruction (low sodium diet, daily weights, label reading, Heart Healthy Cooking Tips & Heart Healthy shopping Tips).  Take all of your medication as directed.  If you become dizzy call your Health Care Provider.

## 2018-10-05 NOTE — DISCHARGE NOTE ADULT - MEDICATION SUMMARY - MEDICATIONS TO TAKE
I will START or STAY ON the medications listed below when I get home from the hospital:    Tylenol Extra Strength 500 mg oral tablet  -- 2 tab(s) by mouth , As Needed  -- Indication: For As needed for pain management    aspirin 81 mg oral delayed release tablet  -- 1 tab(s) by mouth once a day  -- Indication: For Heart    metoprolol tartrate 25 mg oral tablet  -- 0.5 tab(s) by mouth 2 times a day   -- It is very important that you take or use this exactly as directed.  Do not skip doses or discontinue unless directed by your doctor.  May cause drowsiness.  Alcohol may intensify this effect.  Use care when operating dangerous machinery.  Some non-prescription drugs may aggravate your condition.  Read all labels carefully.  If a warning appears, check with your doctor before taking.  Take with food or milk.  This drug may impair the ability to drive or operate machinery.  Use care until you become familiar with its effects.    -- Indication: For Heart rate    Advair Diskus 250 mcg-50 mcg inhalation powder  -- 1 puff(s) inhaled 2 times a day  -- Indication: For COPD (chronic obstructive pulmonary disease)    guaiFENesin 1200 mg oral tablet, extended release  -- 1 tab(s) by mouth every 12 hours  -- Indication: For Pneumonia    senna oral tablet  -- 2 tab(s) by mouth once a day (at bedtime)  -- Indication: For COnstipation    Coenzyme Q10 400 mg oral capsule  -- 1 cap(s) by mouth once a day  -- Indication: For Supplement    Systane ophthalmic solution  -- 1 drop(s) to each affected eye once a day  -- Indication: For Eye drops    ciprofloxacin 500 mg oral tablet  -- 1 tab(s) by mouth once a day  -- Indication: For Pneumonia    levothyroxine 75 mcg (0.075 mg) oral tablet  -- 1 tab(s) by mouth once a day  -- Indication: For Hypothyroidism, unspecified type    Hair, Skin, & Nails Gummies oral tablet, chewable  -- 2 tab(s) by mouth once a day  -- Indication: For Supplement

## 2018-10-05 NOTE — PROGRESS NOTE ADULT - PROBLEM SELECTOR PLAN 1
COPD  -Please discharge on 2L NC with exertion, sp02 documented above  -Will attempt to wean as outpt  -f/u with Dr. William on 10/15, patient instructed to call for appt

## 2018-10-05 NOTE — PROGRESS NOTE ADULT - SUBJECTIVE AND OBJECTIVE BOX
Follow-up Pulm Progress Note    Feeling much better  93% on RA at rest  87% on RA with ambulation   90% on 2L NC with ambulation     Medications:  MEDICATIONS  (STANDING):  ALBUTerol/ipratropium for Nebulization 3 milliLiter(s) Nebulizer every 6 hours  artificial tears (preservative free) Ophthalmic Solution 1 Drop(s) Both EYES daily  aspirin enteric coated 81 milliGRAM(s) Oral daily  buDESOnide 160 MICROgram(s)/formoterol 4.5 MICROgram(s) Inhaler 2 Puff(s) Inhalation two times a day  ciprofloxacin     Tablet 500 milliGRAM(s) Oral daily  guaiFENesin ER 1200 milliGRAM(s) Oral every 12 hours  heparin  Injectable 5000 Unit(s) SubCutaneous every 12 hours  influenza   Vaccine 0.5 milliLiter(s) IntraMuscular once  levothyroxine 75 MICROGram(s) Oral daily  metoprolol tartrate 25 milliGRAM(s) Oral two times a day  senna 2 Tablet(s) Oral at bedtime  sodium chloride 0.9%. 1000 milliLiter(s) (50 mL/Hr) IV Continuous <Continuous>    MEDICATIONS  (PRN):  acetaminophen   Tablet .. 500 milliGRAM(s) Oral every 6 hours PRN Temp greater or equal to 38C (100.4F), Moderate Pain (4 - 6)  ondansetron Injectable 4 milliGRAM(s) IV Push every 6 hours PRN Nausea and/or Vomiting          Vital Signs Last 24 Hrs  T(C): 36.9 (05 Oct 2018 04:26), Max: 36.9 (05 Oct 2018 04:26)  T(F): 98.4 (05 Oct 2018 04:26), Max: 98.4 (05 Oct 2018 04:26)  HR: 88 (05 Oct 2018 05:34) (82 - 101)  BP: 104/60 (05 Oct 2018 05:34) (90/54 - 104/60)  BP(mean): --  RR: 18 (05 Oct 2018 04:26) (18 - 18)  SpO2: 99% (05 Oct 2018 04:26) (95% - 99%) on 2L NC          10-04 @ 07:01  -  10-05 @ 07:00  --------------------------------------------------------  IN: 735 mL / OUT: 1050 mL / NET: -315 mL          LABS:                        11.3   9.33  )-----------( 285      ( 05 Oct 2018 08:13 )             37.1     10-05    139  |  102  |  23  ----------------------------<  104<H>  4.7   |  29  |  1.06    Ca    9.3      05 Oct 2018 06:30            CAPILLARY BLOOD GLUCOSE        PTT - ( 03 Oct 2018 17:29 )  PTT:40.8 sec                    CULTURES: (if applicable)  Culture Results:   Numerous Pseudomonas aeruginosa  Normal Respiratory Vijaya present (10-02 @ 02:18)    Most recent blood culture -- 10-02 @ 02:18   Pseudomonas aeruginosa Pseudomonas aeruginosa .Sputum Sputum 10-02 @ 02:18    Blood culture 10-02 @ 02:18  --    Numerous polymorphonuclear leukocytes  Few Squamous epithelial cells per low power field  Numerous Gram Negative Rods  Numerous Gram positive cocci in pairs, chains and clusters per oil power  field  COREEN  Pseudomonas aeruginosa  Pseudomonas aeruginosa    Urine culture    -->      Physical Examination:  PULM: Rhonchi R, bronchial BS at base  CVS: S1, S2 heard    RADIOLOGY REVIEWED  CT chest: < from: CT Chest No Cont (10.03.18 @ 11:20) >  LUNGS AND LARGE AIRWAYS:     Interval obliteration of the segmental branches of the right lower lobe   bronchus likely secondary to retained secretions.    New dense consolidation in the right lower lobe. There are also right   lower lobe and right middle lobe tree-in-bud opacities, increased from   prior. These findings collectively represent bronchopneumonia of the   right lower lobe.    Tracheomegaly.  Surgical changes from prior left upper lobe lobectomy without evidence of   recurrence. Unchanged elevation of left hemidiaphragm.    PLEURA: Small new right parapneumonic pleural effusion. No pneumothorax.    VESSELS: Calcific atherosclerotic disease of the aorta. The pulmonary   artery is not enlarged. There is no aortic aneurysm. Blood pool in the   aorta is hypodense relative to the myocardium and vessel wall, suggestive   of anemia.    HEART: Heart size is normal. No pericardial effusion.    MEDIASTINUM AND ALEXANDRA: There is a 1 cm left lower paratracheal lymph   nodes, increased in size since the prior exam (series 4 image 316). There   is also a 6 mm right hilar lymph node.  These lymph nodes can be seen in   the context of infection. The esophagus is unremarkable.    CHEST WALL AND LOWER NECK: Limited evaluation of the lower neck secondary   to left shoulder orthopedic hardware metal artifact. However, within the   limits of this exam, the visualized lower neck and chest wall are within   normal limits.    VISUALIZED UPPER ABDOMEN: Hyperdense left renal lesion (series 4 image   848) stable since its abdomen pelvis 5/12/2015. The remainder of the   upper abdomen is unremarkable    BONES: Degenerative changes of the spine. Old healed fractures of the   right sixth through 10th ribs.    IMPRESSION:   1.  Right lower lung bronchopneumonia. Recommend follow-up imaging in 4   weeks after treatment to ensure resolution.  2.  Small right pleural effusion.    < end of copied text > Follow-up Pulm Progress Note    Feeling much better  93% on RA at rest  87% on RA with ambulation   90% on 2L NC with ambulation     Medications:  MEDICATIONS  (STANDING):  ALBUTerol/ipratropium for Nebulization 3 milliLiter(s) Nebulizer every 6 hours  artificial tears (preservative free) Ophthalmic Solution 1 Drop(s) Both EYES daily  aspirin enteric coated 81 milliGRAM(s) Oral daily  buDESOnide 160 MICROgram(s)/formoterol 4.5 MICROgram(s) Inhaler 2 Puff(s) Inhalation two times a day  ciprofloxacin     Tablet 500 milliGRAM(s) Oral daily  guaiFENesin ER 1200 milliGRAM(s) Oral every 12 hours  heparin  Injectable 5000 Unit(s) SubCutaneous every 12 hours  influenza   Vaccine 0.5 milliLiter(s) IntraMuscular once  levothyroxine 75 MICROGram(s) Oral daily  metoprolol tartrate 25 milliGRAM(s) Oral two times a day  senna 2 Tablet(s) Oral at bedtime  sodium chloride 0.9%. 1000 milliLiter(s) (50 mL/Hr) IV Continuous <Continuous>    MEDICATIONS  (PRN):  acetaminophen   Tablet .. 500 milliGRAM(s) Oral every 6 hours PRN Temp greater or equal to 38C (100.4F), Moderate Pain (4 - 6)  ondansetron Injectable 4 milliGRAM(s) IV Push every 6 hours PRN Nausea and/or Vomiting    Vital Signs Last 24 Hrs  T(C): 36.9 (05 Oct 2018 04:26), Max: 36.9 (05 Oct 2018 04:26)  T(F): 98.4 (05 Oct 2018 04:26), Max: 98.4 (05 Oct 2018 04:26)  HR: 88 (05 Oct 2018 05:34) (82 - 101)  BP: 104/60 (05 Oct 2018 05:34) (90/54 - 104/60)  BP(mean): --  RR: 18 (05 Oct 2018 04:26) (18 - 18)  SpO2: 99% (05 Oct 2018 04:26) (95% - 99%) on 2L NC    10-04 @ 07:01  -  10-05 @ 07:00  --------------------------------------------------------  IN: 735 mL / OUT: 1050 mL / NET: -315 mL    LABS:                        11.3   9.33  )-----------( 285      ( 05 Oct 2018 08:13 )             37.1     10-05    139  |  102  |  23  ----------------------------<  104<H>  4.7   |  29  |  1.06    Ca    9.3      05 Oct 2018 06:30    CAPILLARY BLOOD GLUCOSE        PTT - ( 03 Oct 2018 17:29 )  PTT:40.8 sec    CULTURES: (if applicable)  Culture Results:   Numerous Pseudomonas aeruginosa  Normal Respiratory Vijaya present (10-02 @ 02:18)    Most recent blood culture -- 10-02 @ 02:18   Pseudomonas aeruginosa Pseudomonas aeruginosa .Sputum Sputum 10-02 @ 02:18    Physical Examination:  PULM: Rhonchi R, bronchial BS at base  CVS: S1, S2 heard    RADIOLOGY REVIEWED  CT chest: < from: CT Chest No Cont (10.03.18 @ 11:20) >  LUNGS AND LARGE AIRWAYS:     Interval obliteration of the segmental branches of the right lower lobe   bronchus likely secondary to retained secretions.    New dense consolidation in the right lower lobe. There are also right   lower lobe and right middle lobe tree-in-bud opacities, increased from   prior. These findings collectively represent bronchopneumonia of the   right lower lobe.    Tracheomegaly.  Surgical changes from prior left upper lobe lobectomy without evidence of   recurrence. Unchanged elevation of left hemidiaphragm.    PLEURA: Small new right parapneumonic pleural effusion. No pneumothorax.    VESSELS: Calcific atherosclerotic disease of the aorta. The pulmonary   artery is not enlarged. There is no aortic aneurysm. Blood pool in the   aorta is hypodense relative to the myocardium and vessel wall, suggestive   of anemia.    HEART: Heart size is normal. No pericardial effusion.    MEDIASTINUM AND ALEXANDRA: There is a 1 cm left lower paratracheal lymph   nodes, increased in size since the prior exam (series 4 image 316). There   is also a 6 mm right hilar lymph node.  These lymph nodes can be seen in   the context of infection. The esophagus is unremarkable.    CHEST WALL AND LOWER NECK: Limited evaluation of the lower neck secondary   to left shoulder orthopedic hardware metal artifact. However, within the   limits of this exam, the visualized lower neck and chest wall are within   normal limits.    VISUALIZED UPPER ABDOMEN: Hyperdense left renal lesion (series 4 image   848) stable since its abdomen pelvis 5/12/2015. The remainder of the   upper abdomen is unremarkable    BONES: Degenerative changes of the spine. Old healed fractures of the   right sixth through 10th ribs.    IMPRESSION:   1.  Right lower lung bronchopneumonia. Recommend follow-up imaging in 4   weeks after treatment to ensure resolution.  2.  Small right pleural effusion.    < end of copied text >

## 2018-10-05 NOTE — DISCHARGE NOTE ADULT - MEDICATION SUMMARY - MEDICATIONS TO STOP TAKING
I will STOP taking the medications listed below when I get home from the hospital:    Leg cramps  -- 4 tab(s) by mouth once a day (in the evening)    doxycycline hyclate 100 mg oral capsule  -- 1 cap(s) by mouth 2 times a day for 14 days I will STOP taking the medications listed below when I get home from the hospital:    Blue-Emu  -- Apply on skin to affected area , As Needed    Leg cramps  -- 4 tab(s) by mouth once a day (in the evening)    doxycycline hyclate 100 mg oral capsule  -- 1 cap(s) by mouth 2 times a day for 14 days

## 2018-10-05 NOTE — PROGRESS NOTE ADULT - ASSESSMENT
85yo F pmh COPD, lung cancer s/p lobectomy 13 years ago, PVD, hypothyroid, spinal stenosis recent URI treated with abx p/w fatigue, found to have elevated troponin, JASMIN in 1 lead, and TTE with wall motion abnormalities, concerning for late presentation MI vs NSTEMI vs myocarditis. She also has evidence of pneumonia on CT chest with recent URI symptoms and is positive for Rhinovirus/Enterovirus. Sputum cultures positive for mod. pseudomonas. Recent abx use is azithro and doxy, has intolerance not a true allergy to quinolones. She has tolerated cipro in the past and her documented "allergy" was stomach upset and jitteriness which was likely from steroids that she was on at the same time.   Overall Sepsis (tachycardia, tachypnea, hypoxia) due to pseudomonas pneumonia superimposed on viral infection. Leucocytosis.   Resolved sepsis. clinically improved.     Suggest:  -continue Cefepime 1g q12hrs (based on creatinine clearance) likely for 8 days while inpatient  -upon discharge can transition to PO Cipro 500mg q24hr to complete treatment   -trend WBC and fever curve  -Taper oxygen as tolerated.   -pulm on board.

## 2018-10-05 NOTE — PROGRESS NOTE ADULT - ASSESSMENT
83yo F pmh COPD, lung cancer s/p lobectomy 13 years ago, PVD, hypothyroid, spinal stenosis recent URI treated with abx p/w fatigue, found to have elevated troponin, JASMNI in 1 lead, and TTE with wall motion abnormalities, concerning for late presentation MI vs NSTEMI vs myocarditis, on heparin gtt/ACS protocol.     1. NSTEMI secondary to stress induced cardiomyopathy (takotsubo)  atypical presentation, no cp/sob, +weakness   found to have elevated trop as well as abnl ECG with one isolated elevated st segment   CT Coronaries results noted, no significant CAD, LVEF is normal  continue asa, decrease bb to 12.5mg BID      2. acute systolic HF, nonischemic cmp  stable, lv fxn improved on CTA  decrease bb to 12.5mg BID    no ace due to low bp     3. Pneumonia  Superimposed RLL bacterial PNA  on PO abx   wean off O2 as tolerated     4. COPD  stable  continue inhalers     dvt ppx   d/c planning per primary team

## 2018-10-05 NOTE — DISCHARGE NOTE ADULT - MEDICATION SUMMARY - MEDICATIONS TO CHANGE
I will SWITCH the dose or number of times a day I take the medications listed below when I get home from the hospital:    Senna 8.6 mg oral tablet  -- 6 tab(s) by mouth once a day (in the evening)

## 2018-10-05 NOTE — PROGRESS NOTE ADULT - SUBJECTIVE AND OBJECTIVE BOX
CARDIOLOGY FOLLOW UP - Dr. Holcomb    CC no cp/sob       PHYSICAL EXAM:  T(C): 36.9 (10-05-18 @ 04:26), Max: 36.9 (10-05-18 @ 04:26)  HR: 88 (10-05-18 @ 05:34) (82 - 101)  BP: 104/60 (10-05-18 @ 05:34) (90/54 - 104/60)  RR: 18 (10-05-18 @ 04:26) (18 - 18)  SpO2: 99% (10-05-18 @ 04:26) (95% - 99%)  Wt(kg): --  I&O's Summary    04 Oct 2018 07:01  -  05 Oct 2018 07:00  --------------------------------------------------------  IN: 735 mL / OUT: 1050 mL / NET: -315 mL        Appearance: Normal	  Cardiovascular: Normal S1 S2,RRR, No JVD, No murmurs  Respiratory: Lungs clear to auscultation	  Gastrointestinal:  Soft, Non-tender, + BS	  Extremities: Normal range of motion, No clubbing, cyanosis or edema        MEDICATIONS  (STANDING):  ALBUTerol/ipratropium for Nebulization 3 milliLiter(s) Nebulizer every 6 hours  artificial tears (preservative free) Ophthalmic Solution 1 Drop(s) Both EYES daily  aspirin enteric coated 81 milliGRAM(s) Oral daily  buDESOnide 160 MICROgram(s)/formoterol 4.5 MICROgram(s) Inhaler 2 Puff(s) Inhalation two times a day  ciprofloxacin     Tablet 500 milliGRAM(s) Oral daily  guaiFENesin ER 1200 milliGRAM(s) Oral every 12 hours  heparin  Injectable 5000 Unit(s) SubCutaneous every 12 hours  influenza   Vaccine 0.5 milliLiter(s) IntraMuscular once  levothyroxine 75 MICROGram(s) Oral daily  metoprolol tartrate 25 milliGRAM(s) Oral two times a day  senna 2 Tablet(s) Oral at bedtime  sodium chloride 0.9%. 1000 milliLiter(s) (50 mL/Hr) IV Continuous <Continuous>      TELEMETRY: NSR 60-80s 	    ECG:  	  RADIOLOGY:   DIAGNOSTIC TESTING:  [ ] Echocardiogram:  [ ]  Catheterization:  [ ] Stress Test:    OTHER: 	    LABS:	 	                                11.3   9.33  )-----------( 285      ( 05 Oct 2018 08:13 )             37.1     10-05    139  |  102  |  23  ----------------------------<  104<H>  4.7   |  29  |  1.06    Ca    9.3      05 Oct 2018 06:30      PTT - ( 03 Oct 2018 17:29 )  PTT:40.8 sec

## 2018-10-05 NOTE — DISCHARGE NOTE ADULT - PATIENT PORTAL LINK FT
You can access the [facility] Patient Portal, offered by [facility], by registering with the following website: [email or web address]

## 2018-10-05 NOTE — PROGRESS NOTE ADULT - SUBJECTIVE AND OBJECTIVE BOX
Patient is a 84y old  Female who presents with a chief complaint of 'I feel like I was hit by a truck' (05 Oct 2018 10:52)      SUBJECTIVE / OVERNIGHT EVENTS:  feels well.  No chest pain. No shortness of breath. No complaints. No events overnight. O2 sat 89 % on room at rest.    MEDICATIONS  (STANDING):  ALBUTerol/ipratropium for Nebulization 3 milliLiter(s) Nebulizer every 6 hours  artificial tears (preservative free) Ophthalmic Solution 1 Drop(s) Both EYES daily  aspirin enteric coated 81 milliGRAM(s) Oral daily  buDESOnide 160 MICROgram(s)/formoterol 4.5 MICROgram(s) Inhaler 2 Puff(s) Inhalation two times a day  ciprofloxacin     Tablet 500 milliGRAM(s) Oral daily  guaiFENesin ER 1200 milliGRAM(s) Oral every 12 hours  heparin  Injectable 5000 Unit(s) SubCutaneous every 12 hours  influenza   Vaccine 0.5 milliLiter(s) IntraMuscular once  levothyroxine 75 MICROGram(s) Oral daily  metoprolol tartrate 25 milliGRAM(s) Oral two times a day  senna 2 Tablet(s) Oral at bedtime  sodium chloride 0.9%. 1000 milliLiter(s) (50 mL/Hr) IV Continuous <Continuous>    MEDICATIONS  (PRN):  acetaminophen   Tablet .. 500 milliGRAM(s) Oral every 6 hours PRN Temp greater or equal to 38C (100.4F), Moderate Pain (4 - 6)  ondansetron Injectable 4 milliGRAM(s) IV Push every 6 hours PRN Nausea and/or Vomiting      Vital Signs Last 24 Hrs  T(C): 36.9 (05 Oct 2018 04:26), Max: 36.9 (05 Oct 2018 04:26)  T(F): 98.4 (05 Oct 2018 04:26), Max: 98.4 (05 Oct 2018 04:26)  HR: 88 (05 Oct 2018 05:34) (82 - 101)  BP: 104/60 (05 Oct 2018 05:34) (90/54 - 104/60)  BP(mean): --  RR: 18 (05 Oct 2018 04:26) (18 - 18)  SpO2: 99% (05 Oct 2018 04:26) (95% - 99%)  CAPILLARY BLOOD GLUCOSE        I&O's Summary    04 Oct 2018 07:01  -  05 Oct 2018 07:00  --------------------------------------------------------  IN: 735 mL / OUT: 1050 mL / NET: -315 mL        PHYSICAL EXAM:  GENERAL: NAD, well-developed  HEAD:  Atraumatic, Normocephalic  EYES: EOMI, PERRLA, conjunctiva and sclera clear  NECK: Supple, No JVD  CHEST/LUNG: left sided ronchi  HEART: Regular rate and rhythm; No murmurs, rubs, or gallops  ABDOMEN: Soft, Nontender, Nondistended; Bowel sounds present  EXTREMITIES:  2+ Peripheral Pulses, No clubbing, cyanosis, or edema  PSYCH: AAOx3  NEUROLOGY: non-focal  SKIN: No rashes or lesions    LABS:                        11.3   9.33  )-----------( 285      ( 05 Oct 2018 08:13 )             37.1     10-05    139  |  102  |  23  ----------------------------<  104<H>  4.7   |  29  |  1.06    Ca    9.3      05 Oct 2018 06:30      PTT - ( 03 Oct 2018 17:29 )  PTT:40.8 sec          RADIOLOGY & ADDITIONAL TESTS:    Imaging Personally Reviewed:    Consultant(s) Notes Reviewed:      Care Discussed with Consultants/Other Providers:

## 2018-10-05 NOTE — PROGRESS NOTE ADULT - REASON FOR ADMISSION
'I feel like I was hit by a truck'

## 2018-10-05 NOTE — PROGRESS NOTE ADULT - PROBLEM SELECTOR PLAN 2
Superimposed RLL bacterial PNA  -Pan sensitive pseudomonas on sputum culture  -Cipro 500mg qd x 6 more days to complete 7 days total abx. Please give first dose in hospital to see if she tolerates (extremely nauseous with Levaquin)   -Prevnar 23 vaccine as outpt

## 2018-10-05 NOTE — DISCHARGE NOTE ADULT - SECONDARY DIAGNOSIS.
COPD (chronic obstructive pulmonary disease) NSTEMI (non-ST elevated myocardial infarction) CHF (congestive heart failure), NYHA class I, acute, systolic

## 2018-10-05 NOTE — PROGRESS NOTE ADULT - ASSESSMENT
85yo F pmh COPD, lung cancer s/p lobectomy 13 years ago, PVD, hypothyroid, spinal stenosis recent URI treated with abx p/w fatigue, found to have elevated troponin, JASMIN in 1 lead, and TTE with wall motion abnormalities, concerning for late presentation MI vs NSTEMI vs myocarditis, on heparin gtt/ACS protocol plan for cath this week by cardiology.      NSTEMI (non-ST elevated myocardial infarction).    - secondary to stress induced cardiomyopathy (takotsubo)  - atypical presentation, no cp/sob, +weakness   - found to have elevated trop as well as abnl ECG with one isolated elevated st segment   - CT Coronaries results noted, no significant CAD, LVEF is normal  - continue asa, bb     acute systolic HF, nonischemic cmp  - stable, lv fxn improved on CTA  - cont bb as bp tolerates  - no ace due to low bp        Weaknes secondary to PNA  -Sepsis (tachycardia, tachypnea, hypoxia) due to pseudomonas pneumonia superimposed on viral infection. Leucocytosis.   -continue Cefepime 1g q12hrs (based on creatinine clearance) likely for 7-8 days while inpatient  -upon discharge can transition to PO Cipro 500mg q24hr to complete 7 days of treatment   -trend WBC and fever curve    Chronic obstructive pulmonary disease   - no  acute exacerbation  - continue home advair equivalent.   - duonebs  - mucinex  - pt was seen by dr simons  - will need home O2    PVD (peripheral vascular disease).    - no home meds.  Has h/o stents 2004    Hypothyroidism,  - continue home synthroid.     DVT px  - heparin sq    PT eval  d/c planning to home today

## 2018-10-05 NOTE — DISCHARGE NOTE ADULT - CARE PROVIDER_API CALL
Jj William (MD), Critical Care Medicine  [address]  [place] [address]  Phone: [phone]  Fax: [phone]    Timmy Negrete (MD), Cardiovascular Disease  [address]  [place] [address]  Phone: [phone]  Fax: [phone]

## 2018-12-05 RX ORDER — AZITHROMYCIN 500 MG/1
0 TABLET, FILM COATED ORAL
Qty: 0 | Refills: 0 | COMMUNITY
Start: 2018-12-05 | End: 2018-12-09

## 2018-12-07 ENCOUNTER — INPATIENT (INPATIENT)
Facility: HOSPITAL | Age: 83
LOS: 1 days | Discharge: HOME | DRG: 871 | End: 2018-12-09
Attending: INTERNAL MEDICINE | Admitting: INTERNAL MEDICINE
Payer: MEDICARE

## 2018-12-07 VITALS
WEIGHT: 91.93 LBS | SYSTOLIC BLOOD PRESSURE: 172 MMHG | HEART RATE: 116 BPM | RESPIRATION RATE: 18 BRPM | OXYGEN SATURATION: 94 % | DIASTOLIC BLOOD PRESSURE: 92 MMHG | TEMPERATURE: 98 F | HEIGHT: 59 IN

## 2018-12-07 DIAGNOSIS — J44.1 CHRONIC OBSTRUCTIVE PULMONARY DISEASE WITH (ACUTE) EXACERBATION: ICD-10-CM

## 2018-12-07 DIAGNOSIS — R06.02 SHORTNESS OF BREATH: ICD-10-CM

## 2018-12-07 DIAGNOSIS — Z85.118 PERSONAL HISTORY OF OTHER MALIGNANT NEOPLASM OF BRONCHUS AND LUNG: ICD-10-CM

## 2018-12-07 DIAGNOSIS — B33.8 OTHER SPECIFIED VIRAL DISEASES: ICD-10-CM

## 2018-12-07 DIAGNOSIS — Z29.9 ENCOUNTER FOR PROPHYLACTIC MEASURES, UNSPECIFIED: ICD-10-CM

## 2018-12-07 DIAGNOSIS — E03.9 HYPOTHYROIDISM, UNSPECIFIED: ICD-10-CM

## 2018-12-07 DIAGNOSIS — J12.9 VIRAL PNEUMONIA, UNSPECIFIED: ICD-10-CM

## 2018-12-07 LAB
ALBUMIN SERPL ELPH-MCNC: 4.2 G/DL — SIGNIFICANT CHANGE UP (ref 3.3–5)
ALP SERPL-CCNC: 66 U/L — SIGNIFICANT CHANGE UP (ref 40–120)
ALT FLD-CCNC: 10 U/L — SIGNIFICANT CHANGE UP (ref 10–45)
ANION GAP SERPL CALC-SCNC: 13 MMOL/L — SIGNIFICANT CHANGE UP (ref 5–17)
APPEARANCE UR: CLEAR — SIGNIFICANT CHANGE UP
APTT BLD: 29.6 SEC — SIGNIFICANT CHANGE UP (ref 27.5–36.3)
AST SERPL-CCNC: 20 U/L — SIGNIFICANT CHANGE UP (ref 10–40)
BASOPHILS # BLD AUTO: 0 K/UL — SIGNIFICANT CHANGE UP (ref 0–0.2)
BASOPHILS NFR BLD AUTO: 0.1 % — SIGNIFICANT CHANGE UP (ref 0–2)
BILIRUB SERPL-MCNC: 0.3 MG/DL — SIGNIFICANT CHANGE UP (ref 0.2–1.2)
BILIRUB UR-MCNC: NEGATIVE — SIGNIFICANT CHANGE UP
BUN SERPL-MCNC: 16 MG/DL — SIGNIFICANT CHANGE UP (ref 7–23)
CALCIUM SERPL-MCNC: 10.1 MG/DL — SIGNIFICANT CHANGE UP (ref 8.4–10.5)
CHLORIDE SERPL-SCNC: 100 MMOL/L — SIGNIFICANT CHANGE UP (ref 96–108)
CO2 SERPL-SCNC: 24 MMOL/L — SIGNIFICANT CHANGE UP (ref 22–31)
COLOR SPEC: YELLOW — SIGNIFICANT CHANGE UP
CREAT SERPL-MCNC: 0.89 MG/DL — SIGNIFICANT CHANGE UP (ref 0.5–1.3)
DIFF PNL FLD: NEGATIVE — SIGNIFICANT CHANGE UP
EOSINOPHIL # BLD AUTO: 0 K/UL — SIGNIFICANT CHANGE UP (ref 0–0.5)
EOSINOPHIL NFR BLD AUTO: 0.6 % — SIGNIFICANT CHANGE UP (ref 0–6)
GAS PNL BLDV: SIGNIFICANT CHANGE UP
GLUCOSE SERPL-MCNC: 109 MG/DL — HIGH (ref 70–99)
GLUCOSE UR QL: NEGATIVE — SIGNIFICANT CHANGE UP
HCT VFR BLD CALC: 43 % — SIGNIFICANT CHANGE UP (ref 34.5–45)
HGB BLD-MCNC: 14.4 G/DL — SIGNIFICANT CHANGE UP (ref 11.5–15.5)
HPIV2 RNA SPEC QL NAA+PROBE: DETECTED
INR BLD: 0.88 RATIO — SIGNIFICANT CHANGE UP (ref 0.88–1.16)
KETONES UR-MCNC: NEGATIVE — SIGNIFICANT CHANGE UP
LEUKOCYTE ESTERASE UR-ACNC: NEGATIVE — SIGNIFICANT CHANGE UP
LYMPHOCYTES # BLD AUTO: 0.6 K/UL — LOW (ref 1–3.3)
LYMPHOCYTES # BLD AUTO: 11.1 % — LOW (ref 13–44)
MCHC RBC-ENTMCNC: 32.7 PG — SIGNIFICANT CHANGE UP (ref 27–34)
MCHC RBC-ENTMCNC: 33.6 GM/DL — SIGNIFICANT CHANGE UP (ref 32–36)
MCV RBC AUTO: 97.5 FL — SIGNIFICANT CHANGE UP (ref 80–100)
MONOCYTES # BLD AUTO: 0.3 K/UL — SIGNIFICANT CHANGE UP (ref 0–0.9)
MONOCYTES NFR BLD AUTO: 6.4 % — SIGNIFICANT CHANGE UP (ref 2–14)
NEUTROPHILS # BLD AUTO: 4.3 K/UL — SIGNIFICANT CHANGE UP (ref 1.8–7.4)
NEUTROPHILS NFR BLD AUTO: 81.8 % — HIGH (ref 43–77)
NITRITE UR-MCNC: NEGATIVE — SIGNIFICANT CHANGE UP
PH UR: 6 — SIGNIFICANT CHANGE UP (ref 5–8)
PLATELET # BLD AUTO: 215 K/UL — SIGNIFICANT CHANGE UP (ref 150–400)
POTASSIUM SERPL-MCNC: 4.3 MMOL/L — SIGNIFICANT CHANGE UP (ref 3.5–5.3)
POTASSIUM SERPL-SCNC: 4.3 MMOL/L — SIGNIFICANT CHANGE UP (ref 3.5–5.3)
PROT SERPL-MCNC: 7.1 G/DL — SIGNIFICANT CHANGE UP (ref 6–8.3)
PROT UR-MCNC: ABNORMAL
PROTHROM AB SERPL-ACNC: 10 SEC — SIGNIFICANT CHANGE UP (ref 10–12.9)
RAPID RVP RESULT: DETECTED
RBC # BLD: 4.41 M/UL — SIGNIFICANT CHANGE UP (ref 3.8–5.2)
RBC # FLD: 12.6 % — SIGNIFICANT CHANGE UP (ref 10.3–14.5)
SODIUM SERPL-SCNC: 137 MMOL/L — SIGNIFICANT CHANGE UP (ref 135–145)
SP GR SPEC: 1.02 — SIGNIFICANT CHANGE UP (ref 1.01–1.02)
UROBILINOGEN FLD QL: NEGATIVE — SIGNIFICANT CHANGE UP
WBC # BLD: 5.3 K/UL — SIGNIFICANT CHANGE UP (ref 3.8–10.5)
WBC # FLD AUTO: 5.3 K/UL — SIGNIFICANT CHANGE UP (ref 3.8–10.5)

## 2018-12-07 PROCEDURE — 99285 EMERGENCY DEPT VISIT HI MDM: CPT | Mod: GC

## 2018-12-07 PROCEDURE — 71045 X-RAY EXAM CHEST 1 VIEW: CPT | Mod: 26

## 2018-12-07 PROCEDURE — 71250 CT THORAX DX C-: CPT | Mod: 26

## 2018-12-07 PROCEDURE — 99223 1ST HOSP IP/OBS HIGH 75: CPT

## 2018-12-07 RX ORDER — SENNA PLUS 8.6 MG/1
1 TABLET ORAL DAILY
Qty: 0 | Refills: 0 | Status: DISCONTINUED | OUTPATIENT
Start: 2018-12-07 | End: 2018-12-09

## 2018-12-07 RX ORDER — ONDANSETRON 8 MG/1
4 TABLET, FILM COATED ORAL ONCE
Qty: 0 | Refills: 0 | Status: COMPLETED | OUTPATIENT
Start: 2018-12-07 | End: 2018-12-07

## 2018-12-07 RX ORDER — SENNA PLUS 8.6 MG/1
4 TABLET ORAL
Qty: 0 | Refills: 0 | COMMUNITY

## 2018-12-07 RX ORDER — LEVOTHYROXINE SODIUM 125 MCG
75 TABLET ORAL DAILY
Qty: 0 | Refills: 0 | Status: DISCONTINUED | OUTPATIENT
Start: 2018-12-07 | End: 2018-12-09

## 2018-12-07 RX ORDER — ACETAMINOPHEN 500 MG
650 TABLET ORAL EVERY 6 HOURS
Qty: 0 | Refills: 0 | Status: DISCONTINUED | OUTPATIENT
Start: 2018-12-07 | End: 2018-12-09

## 2018-12-07 RX ORDER — LEVOTHYROXINE SODIUM 125 MCG
1 TABLET ORAL
Qty: 0 | Refills: 0 | COMMUNITY

## 2018-12-07 RX ORDER — BUDESONIDE AND FORMOTEROL FUMARATE DIHYDRATE 160; 4.5 UG/1; UG/1
2 AEROSOL RESPIRATORY (INHALATION)
Qty: 0 | Refills: 0 | Status: DISCONTINUED | OUTPATIENT
Start: 2018-12-07 | End: 2018-12-09

## 2018-12-07 RX ORDER — ENOXAPARIN SODIUM 100 MG/ML
40 INJECTION SUBCUTANEOUS EVERY 24 HOURS
Qty: 0 | Refills: 0 | Status: DISCONTINUED | OUTPATIENT
Start: 2018-12-07 | End: 2018-12-09

## 2018-12-07 RX ORDER — ACETAMINOPHEN 500 MG
2 TABLET ORAL
Qty: 0 | Refills: 0 | COMMUNITY

## 2018-12-07 RX ORDER — AZITHROMYCIN 500 MG/1
500 TABLET, FILM COATED ORAL EVERY 24 HOURS
Qty: 0 | Refills: 0 | Status: DISCONTINUED | OUTPATIENT
Start: 2018-12-08 | End: 2018-12-09

## 2018-12-07 RX ORDER — PIPERACILLIN AND TAZOBACTAM 4; .5 G/20ML; G/20ML
3.38 INJECTION, POWDER, LYOPHILIZED, FOR SOLUTION INTRAVENOUS ONCE
Qty: 0 | Refills: 0 | Status: DISCONTINUED | OUTPATIENT
Start: 2018-12-07 | End: 2018-12-07

## 2018-12-07 RX ORDER — CEFTRIAXONE 500 MG/1
1 INJECTION, POWDER, FOR SOLUTION INTRAMUSCULAR; INTRAVENOUS ONCE
Qty: 0 | Refills: 0 | Status: COMPLETED | OUTPATIENT
Start: 2018-12-07 | End: 2018-12-07

## 2018-12-07 RX ORDER — VANCOMYCIN HCL 1 G
1000 VIAL (EA) INTRAVENOUS ONCE
Qty: 0 | Refills: 0 | Status: DISCONTINUED | OUTPATIENT
Start: 2018-12-07 | End: 2018-12-07

## 2018-12-07 RX ORDER — AZITHROMYCIN 500 MG/1
500 TABLET, FILM COATED ORAL ONCE
Qty: 0 | Refills: 0 | Status: COMPLETED | OUTPATIENT
Start: 2018-12-07 | End: 2018-12-07

## 2018-12-07 RX ORDER — UBIDECARENONE 100 MG
1 CAPSULE ORAL
Qty: 0 | Refills: 0 | COMMUNITY

## 2018-12-07 RX ORDER — ONDANSETRON 8 MG/1
4 TABLET, FILM COATED ORAL EVERY 6 HOURS
Qty: 0 | Refills: 0 | Status: DISCONTINUED | OUTPATIENT
Start: 2018-12-07 | End: 2018-12-09

## 2018-12-07 RX ORDER — ACETAMINOPHEN 500 MG
975 TABLET ORAL ONCE
Qty: 0 | Refills: 0 | Status: COMPLETED | OUTPATIENT
Start: 2018-12-07 | End: 2018-12-07

## 2018-12-07 RX ORDER — SODIUM CHLORIDE 9 MG/ML
1250 INJECTION INTRAMUSCULAR; INTRAVENOUS; SUBCUTANEOUS ONCE
Qty: 0 | Refills: 0 | Status: COMPLETED | OUTPATIENT
Start: 2018-12-07 | End: 2018-12-07

## 2018-12-07 RX ORDER — IPRATROPIUM/ALBUTEROL SULFATE 18-103MCG
3 AEROSOL WITH ADAPTER (GRAM) INHALATION EVERY 6 HOURS
Qty: 0 | Refills: 0 | Status: DISCONTINUED | OUTPATIENT
Start: 2018-12-07 | End: 2018-12-09

## 2018-12-07 RX ADMIN — AZITHROMYCIN 500 MILLIGRAM(S): 500 TABLET, FILM COATED ORAL at 15:48

## 2018-12-07 RX ADMIN — ONDANSETRON 4 MILLIGRAM(S): 8 TABLET, FILM COATED ORAL at 21:16

## 2018-12-07 RX ADMIN — ENOXAPARIN SODIUM 40 MILLIGRAM(S): 100 INJECTION SUBCUTANEOUS at 21:15

## 2018-12-07 RX ADMIN — ONDANSETRON 4 MILLIGRAM(S): 8 TABLET, FILM COATED ORAL at 12:06

## 2018-12-07 RX ADMIN — CEFTRIAXONE 100 GRAM(S): 500 INJECTION, POWDER, FOR SOLUTION INTRAMUSCULAR; INTRAVENOUS at 12:07

## 2018-12-07 RX ADMIN — CEFTRIAXONE 1 GRAM(S): 500 INJECTION, POWDER, FOR SOLUTION INTRAMUSCULAR; INTRAVENOUS at 12:37

## 2018-12-07 RX ADMIN — AZITHROMYCIN 250 MILLIGRAM(S): 500 TABLET, FILM COATED ORAL at 14:38

## 2018-12-07 RX ADMIN — Medication 650 MILLIGRAM(S): at 22:00

## 2018-12-07 RX ADMIN — Medication 975 MILLIGRAM(S): at 12:38

## 2018-12-07 RX ADMIN — Medication 3 MILLILITER(S): at 18:25

## 2018-12-07 RX ADMIN — BUDESONIDE AND FORMOTEROL FUMARATE DIHYDRATE 2 PUFF(S): 160; 4.5 AEROSOL RESPIRATORY (INHALATION) at 18:43

## 2018-12-07 RX ADMIN — Medication 975 MILLIGRAM(S): at 12:06

## 2018-12-07 RX ADMIN — SODIUM CHLORIDE 1250 MILLILITER(S): 9 INJECTION INTRAMUSCULAR; INTRAVENOUS; SUBCUTANEOUS at 12:07

## 2018-12-07 RX ADMIN — SENNA PLUS 1 TABLET(S): 8.6 TABLET ORAL at 21:16

## 2018-12-07 RX ADMIN — Medication 650 MILLIGRAM(S): at 21:16

## 2018-12-07 NOTE — CONSULT NOTE ADULT - SUBJECTIVE AND OBJECTIVE BOX
PULMONARY CONSULT    HPI: 86 y/o F with PMH of COPD, Lung CA s/p with lobectomy, PVD, hypothyroidism, spinal stenosis presents with 2 week history of cough, sore throat, dyspnea. Found to have paraflu with CT evidence of likely viral PNA.     PAST MEDICAL & SURGICAL HISTORY:  PVD (peripheral vascular disease): S/P ;ower ext stents more than 10 years  Fall: S/P fall with 3 RT rib fractures 12/30/2013  Pneumonia: Nov 2013  High cholesterol  Peripheral Vascular Disease  Adult Hypothyroidism  COPD Exacerbation  H/O: Lung Cancer  S/P Laminectomy with Spinal Fusion  S/P Shoulder Surgery  S/P Lobectomy of Lung    Allergies    predniSONE (Unknown)  tetanus immune globulin (Rash)  tetnus (Rash)    Intolerances    Levaquin (Nausea)    FAMILY HISTORY:  No pertinent family history in first degree relatives    Social history:     Review of Systems:  CONSTITUTIONAL: No fever, chills, or fatigue  EYES: No eye pain, visual disturbances, or discharge  ENMT:  No difficulty hearing, tinnitus, vertigo; No sinus or throat pain  NECK: No pain or stiffness  RESPIRATORY: Per above  CARDIOVASCULAR: No chest pain, palpitations, dizziness, or leg swelling  GASTROINTESTINAL: No abdominal or epigastric pain. No nausea, vomiting, or hematemesis; No diarrhea or constipation. No melena or hematochezia.  GENITOURINARY: No dysuria, frequency, hematuria, or incontinence  NEUROLOGICAL: No headaches, memory loss, loss of strength, numbness, or tremors  SKIN: No itching, burning, rashes, or lesions   MUSCULOSKELETAL: No joint pain or swelling; No muscle, back, or extremity pain  PSYCHIATRIC: No depression, anxiety, mood swings, or difficulty sleeping      Medications:  MEDICATIONS  (STANDING):  piperacillin/tazobactam IVPB. 3.375 Gram(s) IV Intermittent once  vancomycin  IVPB 1000 milliGRAM(s) IV Intermittent once    MEDICATIONS  (PRN):            Vital Signs Last 24 Hrs  T(C): 37.6 (07 Dec 2018 13:00), Max: 38.9 (07 Dec 2018 11:54)  T(F): 99.6 (07 Dec 2018 13:00), Max: 102 (07 Dec 2018 11:54)  HR: 98 (07 Dec 2018 14:30) (97 - 116)  BP: 120/66 (07 Dec 2018 14:30) (117/68 - 172/92)  BP(mean): --  RR: 20 (07 Dec 2018 14:30) (18 - 23)  SpO2: 99% (07 Dec 2018 14:30) (92% - 99%)      VBG pH 7.34 12-07 @ 12:07  VBG pCO2 54 12-07 @ 12:07  VBG O2 sat 54 12-07 @ 12:07  VBG lactate 1.2 12-07 @ 12:07            LABS:                        14.4   5.3   )-----------( 215      ( 07 Dec 2018 12:07 )             43.0     12-07    137  |  100  |  16  ----------------------------<  109<H>  4.3   |  24  |  0.89    Ca    10.1      07 Dec 2018 12:07    TPro  7.1  /  Alb  4.2  /  TBili  0.3  /  DBili  x   /  AST  20  /  ALT  10  /  AlkPhos  66  12-07          CAPILLARY BLOOD GLUCOSE        PT/INR - ( 07 Dec 2018 12:07 )   PT: 10.0 sec;   INR: 0.88 ratio         PTT - ( 07 Dec 2018 12:07 )  PTT:29.6 sec                  CULTURES: (if applicable)        Physical Examination:    General: No acute distress.      HEENT: Pupils equal, reactive to light.  Symmetric.    PULM: Clear to auscultation bilaterally, no significant sputum production    CVS: S1, S2    ABD: Soft, nondistended, nontender, normoactive bowel sounds, no masses    EXT: No edema, nontender    SKIN: Warm and well perfused, no rashes noted.    NEURO: Alert, oriented, interactive, nonfocal    RADIOLOGY REVIEWED  CT chest: Tracheomegaly   Emphysema  Scattered tree in bud peripheral opacities PULMONARY CONSULT    HPI: 86 y/o F with PMH of COPD, Lung CA s/p with lobectomy, PVD, hypothyroidism, spinal stenosis presents with 2 week history of cough, sore throat, dyspnea. Found to have paraflu with CT evidence of likely viral PNA. Currently 100% on 2L NC.     PAST MEDICAL & SURGICAL HISTORY:  PVD (peripheral vascular disease): S/P ;ower ext stents more than 10 years  Fall: S/P fall with 3 RT rib fractures 12/30/2013  Pneumonia: Nov 2013  High cholesterol  Peripheral Vascular Disease  Adult Hypothyroidism  COPD Exacerbation  H/O: Lung Cancer  S/P Laminectomy with Spinal Fusion  S/P Shoulder Surgery  S/P Lobectomy of Lung    Allergies    predniSONE (Unknown)  tetanus immune globulin (Rash)  tetnus (Rash)    Intolerances    Levaquin (Nausea)    FAMILY HISTORY:  No pertinent family history in first degree relatives    Social history: Former smoker     Review of Systems:  CONSTITUTIONAL: No fever, chills, or fatigue  EYES: No eye pain, visual disturbances, or discharge  ENMT:  No difficulty hearing, tinnitus, vertigo; No sinus or throat pain  NECK: No pain or stiffness  RESPIRATORY: Per above  CARDIOVASCULAR: No chest pain, palpitations, dizziness, or leg swelling  GASTROINTESTINAL: No abdominal or epigastric pain. No nausea, vomiting, or hematemesis; No diarrhea or constipation. No melena or hematochezia.  GENITOURINARY: No dysuria, frequency, hematuria, or incontinence  NEUROLOGICAL: No headaches, memory loss, loss of strength, numbness, or tremors  SKIN: No itching, burning, rashes, or lesions   MUSCULOSKELETAL: No joint pain or swelling; No muscle, back, or extremity pain  PSYCHIATRIC: No depression, anxiety, mood swings, or difficulty sleeping      Medications:  MEDICATIONS  (STANDING):  piperacillin/tazobactam IVPB. 3.375 Gram(s) IV Intermittent once  vancomycin  IVPB 1000 milliGRAM(s) IV Intermittent once    MEDICATIONS  (PRN):            Vital Signs Last 24 Hrs  T(C): 37.6 (07 Dec 2018 13:00), Max: 38.9 (07 Dec 2018 11:54)  T(F): 99.6 (07 Dec 2018 13:00), Max: 102 (07 Dec 2018 11:54)  HR: 98 (07 Dec 2018 14:30) (97 - 116)  BP: 120/66 (07 Dec 2018 14:30) (117/68 - 172/92)  BP(mean): --  RR: 20 (07 Dec 2018 14:30) (18 - 23)  SpO2: 99% (07 Dec 2018 14:30) (92% - 99%) on 2L NC      VBG pH 7.34 12-07 @ 12:07  VBG pCO2 54 12-07 @ 12:07  VBG O2 sat 54 12-07 @ 12:07  VBG lactate 1.2 12-07 @ 12:07            LABS:                        14.4   5.3   )-----------( 215      ( 07 Dec 2018 12:07 )             43.0     12-07    137  |  100  |  16  ----------------------------<  109<H>  4.3   |  24  |  0.89    Ca    10.1      07 Dec 2018 12:07    TPro  7.1  /  Alb  4.2  /  TBili  0.3  /  DBili  x   /  AST  20  /  ALT  10  /  AlkPhos  66  12-07          CAPILLARY BLOOD GLUCOSE        PT/INR - ( 07 Dec 2018 12:07 )   PT: 10.0 sec;   INR: 0.88 ratio         PTT - ( 07 Dec 2018 12:07 )  PTT:29.6 sec                  CULTURES: (if applicable)  RVP: +paraflu        Physical Examination:    General: No acute distress.      HEENT: Pupils equal, reactive to light.  Symmetric.    PULM: Rhonchi bilaterally     CVS: S1, S2    ABD: Soft, nondistended, nontender, normoactive bowel sounds, no masses    EXT: No edema, nontender    SKIN: Warm and well perfused, no rashes noted.    NEURO: Alert, oriented, interactive, nonfocal    RADIOLOGY REVIEWED  CT chest: Tracheomegaly   Emphysema  Scattered tree in bud peripheral opacities

## 2018-12-07 NOTE — ED PROVIDER NOTE - MEDICAL DECISION MAKING DETAILS
Attending MD Gonzalez: 84 yo female COPD, no O2 at home, HTN, presents with 2 weeks of URI symptoms, on a Z-pack given to her by Souleymane William, Pulmonary. Symptoms are worse.  Fever, 99.5.  Spoke to Dr. William and told to come to ED.  Attending MD Gonzalez: A & O x 3, NAD, HEENT WNL and no facial asymmetry; lungs course BS at right base, heart with reg rhythm without murmur; abdomen soft NTND; extremities with no edema; skin with no rashes, neuro exam non focal with no motor or sensory deficits. Consider PNA as patient no febrile and tachycardic.

## 2018-12-07 NOTE — CONSULT NOTE ADULT - PROBLEM SELECTOR RECOMMENDATION 9
2nd paraflu   -Azithromycin 500mg qd x3 days  -Duoneb q6 2nd paraflu   -Azithromycin 500mg qd x3 days  -Duoneb q6  -Symbicort BID (home med: advair BID)  -No need for steroids at current 2nd paraflu   -Azithromycin 500mg qd x3 days  -Duoneb q6  -Symbicort BID (home med: advair BID)  -No need for steroids at current time

## 2018-12-07 NOTE — H&P ADULT - PROBLEM SELECTOR PLAN 4
cont Duoned/Zithromax   Oxygen supplementation with NC and wean as tolerated   Duoneb to be restarted

## 2018-12-07 NOTE — H&P ADULT - PROBLEM SELECTOR PLAN 1
POs Parainfluenza with sepsis that has improved so far   POs parainfluenza   CT of chest with Resolution of Rt lung pneumonia and Tree in bud opacities in both lung   Cont with Zithromax / Oxygen supplement and wean off as tolerated   antitussive/ Duoneb as needed   Pulmonary  consult is noted and appreciated  If patient's hemodynamics worsen will start IV antibiotics for HCAP  and repeat Sputum CX   Closely monitor patient

## 2018-12-07 NOTE — ED PROVIDER NOTE - PROGRESS NOTE DETAILS
spoke with dr simons, who states he will see pt later today or tomorrow while in hospital. agrees with admission. spoke with dr domonique kaur who request admission to dr page.

## 2018-12-07 NOTE — ED PROVIDER NOTE - OBJECTIVE STATEMENT
Pearl Gilbert M.D: 85F hx COPD, lung ca s/p lobectomy, p/w 2 weeks of URI symptoms- cough, sore throat, rhinorrhea. symptoms have been weorsening over the last few days with subjective fevers and cough productive of white sputum. was started on z-gayathri by Dr Souleymane William a few days ago, still taking. called today given worsening symptoms and was told to present to ER. no cp no abd pain +nausea +vomiting.

## 2018-12-07 NOTE — ED PROVIDER NOTE - CARE PLAN
Principal Discharge DX:	Shortness of breath  Secondary Diagnosis:	Fever  Secondary Diagnosis:	HCAP (healthcare-associated pneumonia)

## 2018-12-07 NOTE — ED ADULT NURSE NOTE - OBJECTIVE STATEMENT
84 y/o F A&Ox3 with PMH of PVD, pneumonia, fall, high cholesterol, hypothyroidism, COPD, Lung CA (lung lobectomy 13 years ago), cataracts, shoulder replacement, presents to the ED c.o fever, cough, n/v. Pt. reports has been having URI symptoms for about 2 weeks. Had 99.5 temp yesterday. Spoke with PMD and was advised to come to ED to rule out pneumonia. Pt. reports she has been coughing up yellow colored phlegm. Pt. does not appear to be in any acute distress- nonlabored breathing noted and speaking in full coherent sentences. Rales auscultated to R lower lobe. No edema noted. Rectal temp 102 - MD aware. Pt. placed on CM - sinus tach to the 100s. EKG done. IV access obtained. ABD appears soft, non tender. Stage 1 pressure ulcer - blanchable redness noted to R buttocks. Denies dizziness, HA, n/v/d, CP, SOB, ABD pain, dysuria, and hematuria. Pt. reports uses 2.5L NC at home at night. Pt. sating at 92% on RA - placed on 2 L NC sating at 99%. Safety and comfort provided. Family at bedside.

## 2018-12-07 NOTE — H&P ADULT - ASSESSMENT
85F with  hx COPD ( as needed home oxygen), lung ca s/p Left  lobectomy 13 years ago, PVD, Hypothyroidism, Spinal stenosis  p/w 2 weeks of URI symptoms- cough, sore throat, rhinorrhea. symptoms have been worsening over the last few days with subjective fevers and cough productive of white sputum.  CT of Ct is done which reveals resolution of RLL pneumonia and Tree in bud opacity.

## 2018-12-07 NOTE — ED PROVIDER NOTE - PHYSICAL EXAMINATION
Pearl Gilbert M.D.:   patient awake alert NAD .   LUNGS Coarse breath sounds at right base.   CARD tachycardic no m/r/g.    Abdomen soft NT ND no rebound no guarding no CVA tenderness.   EXT WWP no edema no calf tenderness CV 2+DP/PT bilaterally.   neuro A&Ox3 gait normal.    skin warm and dry no rash  HEENT: dry mucous membranes, PERRL, EOMI

## 2018-12-07 NOTE — ED ADULT NURSE REASSESSMENT NOTE - NS ED NURSE REASSESS COMMENT FT1
19:05. Report received from DWAIN Shaw. Pt AAOx4, NAD, resp nonlabored, skin warm/dry, resting comfortably in bed. Pt denies headache, dizziness, chest pain, palpitations, SOB, abd pain, n/v/d, urinary symptoms, fevers, chills, weakness at this time. Pt awaiting transport to bed upstairs on 8 Pj. Safety maintained.

## 2018-12-07 NOTE — H&P ADULT - PROBLEM SELECTOR PLAN 2
most likely from pos Parainfluenza with sepsis that has improved so far   CT of chest with Resolution of Rt lung pneumonia and Tree in bud opacities in both lung   Cont with Zithromax / Oxygen supplement and wean off as tolerated   antitussive/ Duoneb as needed   pt is s/p IVF in the ED /cont PO intake   Pulmonary  consult is noted and appreciated  If patient's hemodynamics worsen will start IV antibiotics for HCAP  and repeat Sputum CX   Closely monitor patient

## 2018-12-07 NOTE — CONSULT NOTE ADULT - PROBLEM SELECTOR RECOMMENDATION 2
2nd paraflu   -CT chest with scattered peripheral GGO  -check procalcitonin, would suggest observing off abx

## 2018-12-07 NOTE — H&P ADULT - NSHPLABSRESULTS_GEN_ALL_CORE
Lab results , CXR and ECG results were reviewed by me with noted  WBC = 5.3  H/H= 14.4/43.0  PLT = 215   Neutr= 81.8  L= 11.0    Cr = 0.89  RVP : pos Parainfluenza  Lactate= 1.2

## 2018-12-07 NOTE — ED ADULT TRIAGE NOTE - CHIEF COMPLAINT QUOTE
I think I might have pna; my doctor wants a ct and he wants to be called; im weak and have a cough and a low grade fever; this am I vomited

## 2018-12-07 NOTE — H&P ADULT - HISTORY OF PRESENT ILLNESS
85F with  hx COPD, lung ca s/p lobectomy 13 years ago, PVD, Hypothyroidism, Spinal stenosis  p/w 2 weeks of URI symptoms- cough, sore throat, rhinorrhea. symptoms have been weorsening over the last few days with subjective fevers and cough productive of white sputum. was started on z-gayathri by Dr Souleymane William a few days ago,  called today given worsening symptoms and was told to present to ER. no cp no abd pain +nausea +vomiting.      ED : 85F with  hx COPD, lung ca s/p lobectomy 13 years ago, PVD, Hypothyroidism, Spinal stenosis  p/w 2 weeks of URI symptoms- cough, sore throat, rhinorrhea. symptoms have been weorsening over the last few days with subjective fevers and cough productive of white sputum. was started on z-gayathri by Dr Souleymane William a few days ago,  called today given worsening symptoms and was told to present to ER. no cp no abd pain +nausea +vomiting.  Of note patient was recently d/c from [facility] after being treated for Pseudomonas RLL pneumonia.  As per son, patient was doing fine and went back to her usual activities until one week ago when she started feeling unwell with cough , sore throat       ED :  Tm= 102F  Tc= 99.6F   HR=   BP= 117-172/63-92 85F with  hx COPD, lung ca s/p lobectomy 13 years ago, PVD, Hypothyroidism, Spinal stenosis  p/w 2 weeks of URI symptoms- cough, sore throat, rhinorrhea. symptoms have been weorsening over the last few days with subjective fevers and cough productive of white sputum. was started on z-gayathri by Dr Souleymane William a few days ago,  called today given worsening symptoms and was told to present to ER. no cp no abd pain +nausea +vomiting.  Of note patient was recently hospitalized at  [facility] ( 9/30-10/5) when she was treated for Pseudomonas RLL pneumonia with IV Cefepime and d/c on Cipro oral.  As per son, patient was doing fine and went back to her usual activities until one week ago when she started feeling unwell with cough with whitish sputum production, sore throat, rhinorrhea  NO diarrhea , no GI complaints.   is also sick as per son.  Patient states that she felt warm at home but no fever recorded from her Thermometer.        ED :  Tm= 102F  Tc= 99.6F   HR=   BP= 117-172/63-92  RR= 18-23  SPo2= 92% RA and 98% on 2 L NC  .  WBC = 5.3  H/H= 14.4/43.0  PLT = 215   Neutr= 81.8  L= 11.0    Cr = 0.89  RVP : pos Parainfluenza  Lactate= 1.2  Patient received 1250ml of NS , one dose of Zithromax 500mg IV once , Ceftriaxone 1 gram IV once 85F with  hx COPD ( as needed home oxygen), lung ca s/p Left  lobectomy 13 years ago, PVD, Hypothyroidism, Spinal stenosis  p/w 2 weeks of URI symptoms- cough, sore throat, rhinorrhea. symptoms have been weorsening over the last few days with subjective fevers and cough productive of white sputum. was started on z-gayathri by Dr Souleymane William a few days ago,  called today given worsening symptoms and was told to present to ER. no cp no abd pain +nausea +vomiting.  Of note patient was recently hospitalized at  [facility] ( 9/30-10/5) when she was treated for Pseudomonas RLL pneumonia with IV Cefepime and d/c on Cipro oral.  As per son, patient was doing fine and went back to her usual activities until one week ago when she started feeling unwell with cough with whitish sputum production, sore throat, rhinorrhea  NO diarrhea , no GI complaints.   is also sick as per son.  Patient states that she felt warm at home but no fever recorded from her Thermometer.        ED :  Tm= 102F  Tc= 99.6F   HR=   BP= 117-172/63-92  RR= 18-23  SPo2= 92% RA and 98% on 2 L NC  .  WBC = 5.3  H/H= 14.4/43.0  PLT = 215   Neutr= 81.8  L= 11.0    Cr = 0.89  RVP : pos Parainfluenza  Lactate= 1.2  Patient received 1250ml of NS , one dose of Zithromax 500mg IV once , Ceftriaxone 1 gram IV once

## 2018-12-07 NOTE — H&P ADULT - PROBLEM SELECTOR PLAN 3
Cont with Zithromax / Oxygen supplement and wean off as tolerated   antitussive/ Duoneb as needed   Pt is s/p IVF in the ED /cont PO intake   Pulmonary  consult is noted and appreciated  If patient's hemodynamics worsen will start IV antibiotics for HCAP  and repeat Sputum CX   Closely monitor patient

## 2018-12-08 LAB
ALBUMIN SERPL ELPH-MCNC: 3.5 G/DL — SIGNIFICANT CHANGE UP (ref 3.3–5)
ALP SERPL-CCNC: 54 U/L — SIGNIFICANT CHANGE UP (ref 40–120)
ALT FLD-CCNC: 9 U/L — LOW (ref 10–45)
ANION GAP SERPL CALC-SCNC: 8 MMOL/L — SIGNIFICANT CHANGE UP (ref 5–17)
AST SERPL-CCNC: 18 U/L — SIGNIFICANT CHANGE UP (ref 10–40)
BASOPHILS # BLD AUTO: 0.02 K/UL — SIGNIFICANT CHANGE UP (ref 0–0.2)
BASOPHILS NFR BLD AUTO: 0.4 % — SIGNIFICANT CHANGE UP (ref 0–2)
BILIRUB SERPL-MCNC: 0.2 MG/DL — SIGNIFICANT CHANGE UP (ref 0.2–1.2)
BUN SERPL-MCNC: 14 MG/DL — SIGNIFICANT CHANGE UP (ref 7–23)
CALCIUM SERPL-MCNC: 9.3 MG/DL — SIGNIFICANT CHANGE UP (ref 8.4–10.5)
CHLORIDE SERPL-SCNC: 99 MMOL/L — SIGNIFICANT CHANGE UP (ref 96–108)
CO2 SERPL-SCNC: 26 MMOL/L — SIGNIFICANT CHANGE UP (ref 22–31)
CREAT SERPL-MCNC: 0.94 MG/DL — SIGNIFICANT CHANGE UP (ref 0.5–1.3)
CULTURE RESULTS: SIGNIFICANT CHANGE UP
EOSINOPHIL # BLD AUTO: 0.04 K/UL — SIGNIFICANT CHANGE UP (ref 0–0.5)
EOSINOPHIL NFR BLD AUTO: 0.8 % — SIGNIFICANT CHANGE UP (ref 0–6)
GLUCOSE SERPL-MCNC: 112 MG/DL — HIGH (ref 70–99)
HCT VFR BLD CALC: 39.1 % — SIGNIFICANT CHANGE UP (ref 34.5–45)
HGB BLD-MCNC: 12.1 G/DL — SIGNIFICANT CHANGE UP (ref 11.5–15.5)
IMM GRANULOCYTES NFR BLD AUTO: 0.6 % — SIGNIFICANT CHANGE UP (ref 0–1.5)
LACTATE SERPL-SCNC: 1 MMOL/L — SIGNIFICANT CHANGE UP (ref 0.7–2)
LYMPHOCYTES # BLD AUTO: 0.6 K/UL — LOW (ref 1–3.3)
LYMPHOCYTES # BLD AUTO: 12.2 % — LOW (ref 13–44)
MAGNESIUM SERPL-MCNC: 1.5 MG/DL — LOW (ref 1.6–2.6)
MCHC RBC-ENTMCNC: 30.8 PG — SIGNIFICANT CHANGE UP (ref 27–34)
MCHC RBC-ENTMCNC: 30.9 GM/DL — LOW (ref 32–36)
MCV RBC AUTO: 99.5 FL — SIGNIFICANT CHANGE UP (ref 80–100)
MONOCYTES # BLD AUTO: 0.53 K/UL — SIGNIFICANT CHANGE UP (ref 0–0.9)
MONOCYTES NFR BLD AUTO: 10.8 % — SIGNIFICANT CHANGE UP (ref 2–14)
NEUTROPHILS # BLD AUTO: 3.71 K/UL — SIGNIFICANT CHANGE UP (ref 1.8–7.4)
NEUTROPHILS NFR BLD AUTO: 75.2 % — SIGNIFICANT CHANGE UP (ref 43–77)
PHOSPHATE SERPL-MCNC: 3.5 MG/DL — SIGNIFICANT CHANGE UP (ref 2.5–4.5)
PLATELET # BLD AUTO: 212 K/UL — SIGNIFICANT CHANGE UP (ref 150–400)
POTASSIUM SERPL-MCNC: 4.3 MMOL/L — SIGNIFICANT CHANGE UP (ref 3.5–5.3)
POTASSIUM SERPL-SCNC: 4.3 MMOL/L — SIGNIFICANT CHANGE UP (ref 3.5–5.3)
PROCALCITONIN SERPL-MCNC: 0.2 NG/ML — HIGH (ref 0.02–0.1)
PROT SERPL-MCNC: 6.1 G/DL — SIGNIFICANT CHANGE UP (ref 6–8.3)
RBC # BLD: 3.93 M/UL — SIGNIFICANT CHANGE UP (ref 3.8–5.2)
RBC # FLD: 14.1 % — SIGNIFICANT CHANGE UP (ref 10.3–14.5)
SODIUM SERPL-SCNC: 133 MMOL/L — LOW (ref 135–145)
SPECIMEN SOURCE: SIGNIFICANT CHANGE UP
WBC # BLD: 4.93 K/UL — SIGNIFICANT CHANGE UP (ref 3.8–10.5)
WBC # FLD AUTO: 4.93 K/UL — SIGNIFICANT CHANGE UP (ref 3.8–10.5)

## 2018-12-08 RX ADMIN — Medication 650 MILLIGRAM(S): at 05:00

## 2018-12-08 RX ADMIN — Medication 650 MILLIGRAM(S): at 15:00

## 2018-12-08 RX ADMIN — Medication 75 MICROGRAM(S): at 06:26

## 2018-12-08 RX ADMIN — Medication 3 MILLILITER(S): at 17:18

## 2018-12-08 RX ADMIN — AZITHROMYCIN 250 MILLIGRAM(S): 500 TABLET, FILM COATED ORAL at 14:46

## 2018-12-08 RX ADMIN — Medication 1 DROP(S): at 11:54

## 2018-12-08 RX ADMIN — Medication 650 MILLIGRAM(S): at 13:51

## 2018-12-08 RX ADMIN — BUDESONIDE AND FORMOTEROL FUMARATE DIHYDRATE 2 PUFF(S): 160; 4.5 AEROSOL RESPIRATORY (INHALATION) at 06:27

## 2018-12-08 RX ADMIN — Medication 100 MILLIGRAM(S): at 19:53

## 2018-12-08 RX ADMIN — ONDANSETRON 4 MILLIGRAM(S): 8 TABLET, FILM COATED ORAL at 04:06

## 2018-12-08 RX ADMIN — Medication 3 MILLILITER(S): at 06:26

## 2018-12-08 RX ADMIN — Medication 650 MILLIGRAM(S): at 04:09

## 2018-12-08 RX ADMIN — Medication 650 MILLIGRAM(S): at 21:29

## 2018-12-08 RX ADMIN — Medication 3 MILLILITER(S): at 11:55

## 2018-12-08 RX ADMIN — BUDESONIDE AND FORMOTEROL FUMARATE DIHYDRATE 2 PUFF(S): 160; 4.5 AEROSOL RESPIRATORY (INHALATION) at 17:18

## 2018-12-08 RX ADMIN — ENOXAPARIN SODIUM 40 MILLIGRAM(S): 100 INJECTION SUBCUTANEOUS at 19:55

## 2018-12-08 RX ADMIN — Medication 650 MILLIGRAM(S): at 22:29

## 2018-12-08 RX ADMIN — Medication 3 MILLILITER(S): at 01:36

## 2018-12-08 RX ADMIN — SENNA PLUS 1 TABLET(S): 8.6 TABLET ORAL at 19:55

## 2018-12-08 NOTE — CONSULT NOTE ADULT - SUBJECTIVE AND OBJECTIVE BOX
85F with  hx COPD ( as needed home oxygen), lung ca s/p Left  lobectomy 13 years ago, PVD, Hypothyroidism, Spinal stenosis  p/w 2 weeks of URI symptoms- cough, sore throat, rhinorrhea. symptoms have been weorsening over the last few days with subjective fevers and cough productive of white sputum. was started on z-gayathri by Dr Souleymane William a few days ago,  called today given worsening symptoms and was told to present to ER. no cp no abd pain +nausea +vomiting.  Of note patient was recently hospitalized at  [facility] ( -10/5) when she was treated for Pseudomonas RLL pneumonia with IV Cefepime and d/c on Cipro oral.  As per son, patient was doing fine and went back to her usual activities until one week ago when she started feeling unwell with cough with whitish sputum production, sore throat, rhinorrhea  NO diarrhea , no GI complaints.   is also sick as per son.  Patient states that she felt warm at home but no fever recorded from her Thermometer.        ED :  Tm= 102F  Tc= 99.6F   HR=   BP= 117-172/63-92  RR= 18-23  SPo2= 92% RA and 98% on 2 L NC  .  WBC = 5.3  H/H= 14.4/43.0  PLT = 215   Neutr= 81.8  L= 11.0    Cr = 0.89  RVP : pos Parainfluenza  Lactate= 1.2  Patient received 1250ml of NS , one dose of Zithromax 500mg IV once , Ceftriaxone 1 gram IV once       PAST MEDICAL & SURGICAL HISTORY:  PVD (peripheral vascular disease): S/P ;ower ext stents more than 10 years  Fall: S/P fall with 3 RT rib fractures 2013  Pneumonia: 2013  High cholesterol  Peripheral Vascular Disease  Adult Hypothyroidism  COPD Exacerbation  H/O: Lung Cancer  S/P Laminectomy with Spinal Fusion  S/P Shoulder Surgery  S/P Lobectomy of Lung      Review of Systems:   CONSTITUTIONAL: No fever, weight loss, or fatigue  EYES: No eye pain, visual disturbances, or discharge  ENMT:  No difficulty hearing, tinnitus, vertigo; No sinus or throat pain  NECK: No pain or stiffness  BREASTS: No pain, masses, or nipple discharge  RESPIRATORY: No cough, wheezing, chills or hemoptysis; No shortness of breath  CARDIOVASCULAR: No chest pain, palpitations, dizziness, or leg swelling  GASTROINTESTINAL: No abdominal or epigastric pain. No nausea, vomiting, or hematemesis; No diarrhea or constipation. No melena or hematochezia.  GENITOURINARY: No dysuria, frequency, hematuria, or incontinence  NEUROLOGICAL: No headaches, memory loss, loss of strength, numbness, or tremors  SKIN: No itching, burning, rashes, or lesions   LYMPH NODES: No enlarged glands  ENDOCRINE: No heat or cold intolerance; No hair loss  MUSCULOSKELETAL: No joint pain or swelling; No muscle, back, or extremity pain  PSYCHIATRIC: No depression, anxiety, mood swings, or difficulty sleeping  HEME/LYMPH: No easy bruising, or bleeding gums  ALLERY AND IMMUNOLOGIC: No hives or eczema    Allergies    predniSONE (Unknown)  tetanus immune globulin (Rash)  tetnus (Rash)    Intolerances    Levaquin (Nausea)  narcotic analgesics (Stomach Upset)  statins (Other)      Social History:     FAMILY HISTORY:  No pertinent family history in first degree relatives      MEDICATIONS  (STANDING):  ALBUTerol/ipratropium for Nebulization 3 milliLiter(s) Nebulizer every 6 hours  artificial tears (preservative free) Ophthalmic Solution 1 Drop(s) Both EYES daily  azithromycin  IVPB 500 milliGRAM(s) IV Intermittent every 24 hours  buDESOnide 160 MICROgram(s)/formoterol 4.5 MICROgram(s) Inhaler 2 Puff(s) Inhalation two times a day  enoxaparin Injectable 40 milliGRAM(s) SubCutaneous every 24 hours  levothyroxine 75 MICROGram(s) Oral daily  senna 1 Tablet(s) Oral daily    MEDICATIONS  (PRN):  acetaminophen   Tablet .. 650 milliGRAM(s) Oral every 6 hours PRN Temp greater or equal to 38C (100.4F), Moderate Pain (4 - 6)  guaiFENesin    Syrup 100 milliGRAM(s) Oral every 6 hours PRN Cough  ondansetron Injectable 4 milliGRAM(s) IV Push every 6 hours PRN Nausea      Vital Signs Last 24 Hrs  T(C): 36.6 (08 Dec 2018 08:05), Max: 38.9 (07 Dec 2018 11:54)  T(F): 97.9 (08 Dec 2018 08:05), Max: 102 (07 Dec 2018 11:54)  HR: 99 (08 Dec 2018 08:05) (97 - 116)  BP: 100/62 (08 Dec 2018 08:05) (100/62 - 172/92)  BP(mean): --  RR: 18 (08 Dec 2018 08:05) (18 - 23)  SpO2: 97% (08 Dec 2018 08:05) (92% - 100%)  CAPILLARY BLOOD GLUCOSE        I&O's Summary    07 Dec 2018 07:01  -  08 Dec 2018 07:00  --------------------------------------------------------  IN: 240 mL / OUT: 0 mL / NET: 240 mL        PHYSICAL EXAM:  GENERAL: NAD, well-developed  HEAD:  Atraumatic, Normocephalic  EYES: EOMI, PERRLA, conjunctiva and sclera clear  NECK: Supple, No JVD  CHEST/LUNG: Clear to auscultation bilaterally; mild wheeze  HEART: Regular rate and rhythm; No murmurs, rubs, or gallops  ABDOMEN: Soft, Nontender, Nondistended; Bowel sounds present  EXTREMITIES:  2+ Peripheral Pulses, No clubbing, cyanosis, or edema  PSYCH: AAOx3  NEUROLOGY: non-focal  SKIN: No rashes or lesions    LABS:                        12.1   4.93  )-----------( 212      ( 08 Dec 2018 08:02 )             39.1     12-08    133<L>  |  99  |  14  ----------------------------<  112<H>  4.3   |  26  |  0.94    Ca    9.3      08 Dec 2018 06:55  Phos  3.5     12-08  Mg     1.5     12-08    TPro  6.1  /  Alb  3.5  /  TBili  0.2  /  DBili  x   /  AST  18  /  ALT  9<L>  /  AlkPhos  54  12-08    PT/INR - ( 07 Dec 2018 12:07 )   PT: 10.0 sec;   INR: 0.88 ratio         PTT - ( 07 Dec 2018 12:07 )  PTT:29.6 sec      Urinalysis Basic - ( 07 Dec 2018 16:30 )    Color: Yellow / Appearance: Clear / S.016 / pH: x  Gluc: x / Ketone: Negative  / Bili: Negative / Urobili: Negative   Blood: x / Protein: Trace / Nitrite: Negative   Leuk Esterase: Negative / RBC: 59 /hpf / WBC 1 /hpf   Sq Epi: x / Non Sq Epi: 1 /hpf / Bacteria: Negative        RADIOLOGY & ADDITIONAL TESTS:    Imaging Personally Reviewed:    Consultant(s) Notes Reviewed:      Care Discussed with Consultants/Other Providers:

## 2018-12-08 NOTE — CONSULT NOTE ADULT - ASSESSMENT
84 y/o F with PMH of COPD, Lung CA s/p with lobectomy, PVD, hypothyroidism, spinal stenosis presents with 2 week history of cough, sore throat, dyspnea. Found to have paraflu with CT evidence of likely viral PNA.
85F with  hx COPD ( as needed home oxygen), lung ca s/p Left  lobectomy 13 years ago, PVD, Hypothyroidism, Spinal stenosis  p/w 2 weeks of URI symptoms- cough, sore throat, rhinorrhea. symptoms have been worsening over the last few days with subjective fevers and cough productive of white sputum.  CT of Ct is done which reveals resolution of RLL pneumonia and Tree in bud opacity.         Viral pneumonia.   and  Shortness of breath.    - POs Parainfluenza with sepsis that has improved so far   - POs parainfluenza   - CT of chest with Resolution of Rt lung pneumonia and Tree in bud opacities in both lung   - Cont with Zithromax / Oxygen supplement and wean off as tolerated   - antitussive/ Duoneb as needed   - Pulmonary  consult is noted and appreciated  - If patient's hemodynamics worsen will start IV antibiotics for HCAP  and repeat Sputum CX   - Closely monitor patient.     COPD exacerbation.    - cont Duoned/Zithromax   - Oxygen supplementation with NC and wean as tolerated   - Duoneb to be restarted.      H/O: Lung Cancer.    - S/P Left  lobectomy 13 years ago.     Hypothyroidism.   - COntinue Levothyroxine.    Prophylactic measure.    - cont Heparin subcutaneous.

## 2018-12-09 ENCOUNTER — TRANSCRIPTION ENCOUNTER (OUTPATIENT)
Age: 83
End: 2018-12-09

## 2018-12-09 VITALS
OXYGEN SATURATION: 93 % | RESPIRATION RATE: 18 BRPM | DIASTOLIC BLOOD PRESSURE: 64 MMHG | SYSTOLIC BLOOD PRESSURE: 108 MMHG | HEART RATE: 96 BPM | TEMPERATURE: 98 F

## 2018-12-09 LAB
ALBUMIN SERPL ELPH-MCNC: 3.5 G/DL — SIGNIFICANT CHANGE UP (ref 3.3–5)
ALP SERPL-CCNC: 51 U/L — SIGNIFICANT CHANGE UP (ref 40–120)
ALT FLD-CCNC: 10 U/L — SIGNIFICANT CHANGE UP (ref 10–45)
ANION GAP SERPL CALC-SCNC: 10 MMOL/L — SIGNIFICANT CHANGE UP (ref 5–17)
AST SERPL-CCNC: 20 U/L — SIGNIFICANT CHANGE UP (ref 10–40)
BASOPHILS # BLD AUTO: 0.03 K/UL — SIGNIFICANT CHANGE UP (ref 0–0.2)
BASOPHILS NFR BLD AUTO: 0.7 % — SIGNIFICANT CHANGE UP (ref 0–2)
BILIRUB SERPL-MCNC: 0.1 MG/DL — LOW (ref 0.2–1.2)
BUN SERPL-MCNC: 16 MG/DL — SIGNIFICANT CHANGE UP (ref 7–23)
CALCIUM SERPL-MCNC: 9.4 MG/DL — SIGNIFICANT CHANGE UP (ref 8.4–10.5)
CHLORIDE SERPL-SCNC: 103 MMOL/L — SIGNIFICANT CHANGE UP (ref 96–108)
CO2 SERPL-SCNC: 28 MMOL/L — SIGNIFICANT CHANGE UP (ref 22–31)
CREAT SERPL-MCNC: 0.91 MG/DL — SIGNIFICANT CHANGE UP (ref 0.5–1.3)
EOSINOPHIL # BLD AUTO: 0.08 K/UL — SIGNIFICANT CHANGE UP (ref 0–0.5)
EOSINOPHIL NFR BLD AUTO: 1.9 % — SIGNIFICANT CHANGE UP (ref 0–6)
GLUCOSE SERPL-MCNC: 102 MG/DL — HIGH (ref 70–99)
HCT VFR BLD CALC: 39.6 % — SIGNIFICANT CHANGE UP (ref 34.5–45)
HGB BLD-MCNC: 11.9 G/DL — SIGNIFICANT CHANGE UP (ref 11.5–15.5)
IMM GRANULOCYTES NFR BLD AUTO: 0.7 % — SIGNIFICANT CHANGE UP (ref 0–1.5)
LYMPHOCYTES # BLD AUTO: 1.16 K/UL — SIGNIFICANT CHANGE UP (ref 1–3.3)
LYMPHOCYTES # BLD AUTO: 28.2 % — SIGNIFICANT CHANGE UP (ref 13–44)
MCHC RBC-ENTMCNC: 30.1 GM/DL — LOW (ref 32–36)
MCHC RBC-ENTMCNC: 31.2 PG — SIGNIFICANT CHANGE UP (ref 27–34)
MCV RBC AUTO: 103.9 FL — HIGH (ref 80–100)
MONOCYTES # BLD AUTO: 0.69 K/UL — SIGNIFICANT CHANGE UP (ref 0–0.9)
MONOCYTES NFR BLD AUTO: 16.7 % — HIGH (ref 2–14)
NEUTROPHILS # BLD AUTO: 2.13 K/UL — SIGNIFICANT CHANGE UP (ref 1.8–7.4)
NEUTROPHILS NFR BLD AUTO: 51.8 % — SIGNIFICANT CHANGE UP (ref 43–77)
PLATELET # BLD AUTO: 209 K/UL — SIGNIFICANT CHANGE UP (ref 150–400)
POTASSIUM SERPL-MCNC: 4.9 MMOL/L — SIGNIFICANT CHANGE UP (ref 3.5–5.3)
POTASSIUM SERPL-SCNC: 4.9 MMOL/L — SIGNIFICANT CHANGE UP (ref 3.5–5.3)
PROT SERPL-MCNC: 6 G/DL — SIGNIFICANT CHANGE UP (ref 6–8.3)
RBC # BLD: 3.81 M/UL — SIGNIFICANT CHANGE UP (ref 3.8–5.2)
RBC # FLD: 14.3 % — SIGNIFICANT CHANGE UP (ref 10.3–14.5)
SODIUM SERPL-SCNC: 141 MMOL/L — SIGNIFICANT CHANGE UP (ref 135–145)
WBC # BLD: 4.12 K/UL — SIGNIFICANT CHANGE UP (ref 3.8–10.5)
WBC # FLD AUTO: 4.12 K/UL — SIGNIFICANT CHANGE UP (ref 3.8–10.5)

## 2018-12-09 PROCEDURE — 87798 DETECT AGENT NOS DNA AMP: CPT

## 2018-12-09 PROCEDURE — 87486 CHLMYD PNEUM DNA AMP PROBE: CPT

## 2018-12-09 PROCEDURE — 99285 EMERGENCY DEPT VISIT HI MDM: CPT | Mod: 25

## 2018-12-09 PROCEDURE — 87040 BLOOD CULTURE FOR BACTERIA: CPT

## 2018-12-09 PROCEDURE — 71250 CT THORAX DX C-: CPT

## 2018-12-09 PROCEDURE — 84145 PROCALCITONIN (PCT): CPT

## 2018-12-09 PROCEDURE — 83605 ASSAY OF LACTIC ACID: CPT

## 2018-12-09 PROCEDURE — 85730 THROMBOPLASTIN TIME PARTIAL: CPT

## 2018-12-09 PROCEDURE — 80053 COMPREHEN METABOLIC PANEL: CPT

## 2018-12-09 PROCEDURE — 82435 ASSAY OF BLOOD CHLORIDE: CPT

## 2018-12-09 PROCEDURE — 87086 URINE CULTURE/COLONY COUNT: CPT

## 2018-12-09 PROCEDURE — 87581 M.PNEUMON DNA AMP PROBE: CPT

## 2018-12-09 PROCEDURE — 82803 BLOOD GASES ANY COMBINATION: CPT

## 2018-12-09 PROCEDURE — 83735 ASSAY OF MAGNESIUM: CPT

## 2018-12-09 PROCEDURE — 84100 ASSAY OF PHOSPHORUS: CPT

## 2018-12-09 PROCEDURE — 85027 COMPLETE CBC AUTOMATED: CPT

## 2018-12-09 PROCEDURE — 85014 HEMATOCRIT: CPT

## 2018-12-09 PROCEDURE — 82947 ASSAY GLUCOSE BLOOD QUANT: CPT

## 2018-12-09 PROCEDURE — 96365 THER/PROPH/DIAG IV INF INIT: CPT

## 2018-12-09 PROCEDURE — 82330 ASSAY OF CALCIUM: CPT

## 2018-12-09 PROCEDURE — 93005 ELECTROCARDIOGRAM TRACING: CPT

## 2018-12-09 PROCEDURE — 84132 ASSAY OF SERUM POTASSIUM: CPT

## 2018-12-09 PROCEDURE — 85610 PROTHROMBIN TIME: CPT

## 2018-12-09 PROCEDURE — 87633 RESP VIRUS 12-25 TARGETS: CPT

## 2018-12-09 PROCEDURE — 96375 TX/PRO/DX INJ NEW DRUG ADDON: CPT

## 2018-12-09 PROCEDURE — 94640 AIRWAY INHALATION TREATMENT: CPT

## 2018-12-09 PROCEDURE — 81001 URINALYSIS AUTO W/SCOPE: CPT

## 2018-12-09 PROCEDURE — 84295 ASSAY OF SERUM SODIUM: CPT

## 2018-12-09 PROCEDURE — 71045 X-RAY EXAM CHEST 1 VIEW: CPT

## 2018-12-09 RX ORDER — UBIDECARENONE 100 MG
2 CAPSULE ORAL
Qty: 0 | Refills: 0 | COMMUNITY

## 2018-12-09 RX ORDER — ALBUTEROL 90 UG/1
2 AEROSOL, METERED ORAL
Qty: 1 | Refills: 0 | OUTPATIENT
Start: 2018-12-09 | End: 2019-01-07

## 2018-12-09 RX ADMIN — Medication 3 MILLILITER(S): at 11:29

## 2018-12-09 RX ADMIN — Medication 75 MICROGRAM(S): at 05:14

## 2018-12-09 RX ADMIN — Medication 650 MILLIGRAM(S): at 05:19

## 2018-12-09 RX ADMIN — SENNA PLUS 1 TABLET(S): 8.6 TABLET ORAL at 11:40

## 2018-12-09 RX ADMIN — BUDESONIDE AND FORMOTEROL FUMARATE DIHYDRATE 2 PUFF(S): 160; 4.5 AEROSOL RESPIRATORY (INHALATION) at 05:14

## 2018-12-09 RX ADMIN — Medication 3 MILLILITER(S): at 05:14

## 2018-12-09 RX ADMIN — ONDANSETRON 4 MILLIGRAM(S): 8 TABLET, FILM COATED ORAL at 05:14

## 2018-12-09 RX ADMIN — Medication 1 DROP(S): at 11:28

## 2018-12-09 RX ADMIN — Medication 650 MILLIGRAM(S): at 06:19

## 2018-12-09 NOTE — DISCHARGE NOTE ADULT - HOSPITAL COURSE
85F with  hx COPD ( as needed home oxygen), lung ca s/p Left  lobectomy 13 years ago, PVD, Hypothyroidism, Spinal stenosis  p/w 2 weeks of URI symptoms- cough, sore throat, rhinorrhea.   CT of Chest is done which reveals resolution of RLL pneumonia and Tree in bud opacity.  Patient admitted with  Viral pneumonia,  Parainfluenza with sepsis and  COPD exacerbation. Symptoms improved and patient cleared for discharge with pulmonary follow up

## 2018-12-09 NOTE — PROGRESS NOTE ADULT - SUBJECTIVE AND OBJECTIVE BOX
Patient is a 85y old  Female who presents with a chief complaint of Cough (08 Dec 2018 09:12)      SUBJECTIVE / OVERNIGHT EVENTS:  feels better. wants to go home.    MEDICATIONS  (STANDING):  ALBUTerol/ipratropium for Nebulization 3 milliLiter(s) Nebulizer every 6 hours  artificial tears (preservative free) Ophthalmic Solution 1 Drop(s) Both EYES daily  azithromycin  IVPB 500 milliGRAM(s) IV Intermittent every 24 hours  buDESOnide 160 MICROgram(s)/formoterol 4.5 MICROgram(s) Inhaler 2 Puff(s) Inhalation two times a day  enoxaparin Injectable 40 milliGRAM(s) SubCutaneous every 24 hours  levothyroxine 75 MICROGram(s) Oral daily  senna 1 Tablet(s) Oral daily    MEDICATIONS  (PRN):  acetaminophen   Tablet .. 650 milliGRAM(s) Oral every 6 hours PRN Temp greater or equal to 38C (100.4F), Moderate Pain (4 - 6)  guaiFENesin    Syrup 100 milliGRAM(s) Oral every 6 hours PRN Cough  ondansetron Injectable 4 milliGRAM(s) IV Push every 6 hours PRN Nausea      Vital Signs Last 24 Hrs  T(C): 37.1 (09 Dec 2018 08:01), Max: 37.1 (09 Dec 2018 08:01)  T(F): 98.7 (09 Dec 2018 08:01), Max: 98.7 (09 Dec 2018 08:01)  HR: 98 (09 Dec 2018 08:01) (97 - 98)  BP: 111/71 (09 Dec 2018 08:01) (100/64 - 114/69)  BP(mean): --  RR: 18 (09 Dec 2018 08:01) (18 - 18)  SpO2: 99% (09 Dec 2018 08:01) (98% - 100%)  CAPILLARY BLOOD GLUCOSE        I&O's Summary    08 Dec 2018 07:01  -  09 Dec 2018 07:00  --------------------------------------------------------  IN: 420 mL / OUT: 0 mL / NET: 420 mL        PHYSICAL EXAM:  GENERAL: NAD, well-developed  HEAD:  Atraumatic, Normocephalic  EYES: EOMI, PERRLA, conjunctiva and sclera clear  NECK: Supple, No JVD  CHEST/LUNG: Clear to auscultation bilaterally; minimal  wheeze  cris lower lobes  HEART: Regular rate and rhythm; No murmurs, rubs, or gallops  ABDOMEN: Soft, Nontender, Nondistended; Bowel sounds present  EXTREMITIES:  2+ Peripheral Pulses, No clubbing, cyanosis, or edema  PSYCH: AAOx3  NEUROLOGY: non-focal  SKIN: No rashes or lesions    LABS:                        11.9   4.12  )-----------( 209      ( 09 Dec 2018 08:01 )             39.6     12    141  |  103  |  16  ----------------------------<  102<H>  4.9   |  28  |  0.91    Ca    9.4      09 Dec 2018 07:20  Phos  3.5     12-08  Mg     1.5     12-08    TPro  6.0  /  Alb  3.5  /  TBili  0.1<L>  /  DBili  x   /  AST  20  /  ALT  10  /  AlkPhos  51  12-09    PT/INR - ( 07 Dec 2018 12:07 )   PT: 10.0 sec;   INR: 0.88 ratio         PTT - ( 07 Dec 2018 12:07 )  PTT:29.6 sec      Urinalysis Basic - ( 07 Dec 2018 16:30 )    Color: Yellow / Appearance: Clear / S.016 / pH: x  Gluc: x / Ketone: Negative  / Bili: Negative / Urobili: Negative   Blood: x / Protein: Trace / Nitrite: Negative   Leuk Esterase: Negative / RBC: 59 /hpf / WBC 1 /hpf   Sq Epi: x / Non Sq Epi: 1 /hpf / Bacteria: Negative        RADIOLOGY & ADDITIONAL TESTS:    Imaging Personally Reviewed:    Consultant(s) Notes Reviewed:      Care Discussed with Consultants/Other Providers:

## 2018-12-09 NOTE — DISCHARGE NOTE ADULT - PATIENT PORTAL LINK FT
You can access the [facility], offered by [facility], by registering with the following website: [email or web address]

## 2018-12-09 NOTE — DISCHARGE NOTE ADULT - MEDICATION SUMMARY - MEDICATIONS TO TAKE
I will START or STAY ON the medications listed below when I get home from the hospital:    Tylenol Extra Strength 500 mg oral tablet  -- 2 tab(s) by mouth , As Needed  -- Indication: For Pain    Advair Diskus 250 mcg-50 mcg inhalation powder  -- 1 puff(s) inhaled 2 times a day  -- Indication: For COPD exacerbation    albuterol 90 mcg/inh inhalation powder  -- 2 puff(s) inhaled every 6 hours   -- For inhalation only.  It is very important that you take or use this exactly as directed.  Do not skip doses or discontinue unless directed by your doctor.  Obtain medical advice before taking any non-prescription drugs as some may affect the action of this medication.    -- Indication: For Shortness of breath    Robitussin 100 mg/5 mL oral liquid  -- 5 milliliter(s) by mouth , As Needed  -- Indication: For Parainfluenza infection    senna oral tablet  -- 4 tab(s) by mouth once a day  -- Indication: For COnstipation    Azithromycin 5 Day Dose Pack 250 mg oral tablet  -- Take 2 tab(s) on day 1, then 1 tab daily for 4 days    Note: patient has x2 days remaining  -- Indication: For COntinue antibiotics for a total 2 days    Systane ophthalmic solution  -- 1 drop(s) in each eye once a day  -- Indication: For Prophylactic measure    levothyroxine 75 mcg (0.075 mg) oral tablet  -- 1 tab(s) by mouth once a day  -- Indication: For Adult Hypothyroidism

## 2018-12-09 NOTE — DISCHARGE NOTE ADULT - CARE PLAN
Principal Discharge DX:	Shortness of breath  Goal:	resolved  Assessment and plan of treatment:	Follow up with pulmonary  Secondary Diagnosis:	Fever  Goal:	resolved  Secondary Diagnosis:	HCAP (healthcare-associated pneumonia)  Goal:	cont antibiotics x 2 days  Secondary Diagnosis:	COPD exacerbation  Secondary Diagnosis:	Adult Hypothyroidism

## 2018-12-09 NOTE — PROGRESS NOTE ADULT - ASSESSMENT
85F with  hx COPD ( as needed home oxygen), lung ca s/p Left  lobectomy 13 years ago, PVD, Hypothyroidism, Spinal stenosis  p/w 2 weeks of URI symptoms- cough, sore throat, rhinorrhea. symptoms have been worsening over the last few days with subjective fevers and cough productive of white sputum.  CT of Ct is done which reveals resolution of RLL pneumonia and Tree in bud opacity.         Viral pneumonia.   and  Shortness of breath.    - POs Parainfluenza with sepsis that has improved so far   - POs parainfluenza   - CT of chest with Resolution of Rt lung pneumonia and Tree in bud opacities in both lung   - Cont with Zithromax / Oxygen supplement and wean off as tolerated   - antitussive/ Duoneb as needed   - Pulmonary  consult is noted and appreciated  - If patient's hemodynamics worsen will start IV antibiotics for HCAP  and repeat Sputum CX   - Closely monitor patient.     COPD exacerbation.    - cont Duoned/Zithromax   - Oxygen supplementation with NC and wean as tolerated   - Duoneb to be restarted.      H/O: Lung Cancer.    - S/P Left  lobectomy 13 years ago.     Hypothyroidism.   - COntinue Levothyroxine.    Prophylactic measure.    - cont Heparin subcutaneous.     d/c home today.  follow up with dr cormier and dr simons

## 2018-12-09 NOTE — DISCHARGE NOTE ADULT - CARE PROVIDER_API CALL
Timmy Negrete (MD), Cardiovascular Disease  [address] [facility]  [address]  [place] [address]  Phone: [phone]  Fax: [phone]    Jj William), Critical Care Medicine  [address] [place] [address] [address]  [place] [address]  Phone: [phone]  Fax: [phone]

## 2018-12-12 LAB
CULTURE RESULTS: SIGNIFICANT CHANGE UP
CULTURE RESULTS: SIGNIFICANT CHANGE UP
SPECIMEN SOURCE: SIGNIFICANT CHANGE UP
SPECIMEN SOURCE: SIGNIFICANT CHANGE UP

## 2018-12-22 ENCOUNTER — EMERGENCY (EMERGENCY)
Facility: HOSPITAL | Age: 83
LOS: 1 days | Discharge: HOME | End: 2018-12-22
Attending: EMERGENCY MEDICINE
Payer: MEDICARE

## 2018-12-22 VITALS
DIASTOLIC BLOOD PRESSURE: 62 MMHG | HEART RATE: 95 BPM | SYSTOLIC BLOOD PRESSURE: 122 MMHG | RESPIRATION RATE: 22 BRPM | OXYGEN SATURATION: 96 %

## 2018-12-22 VITALS
HEIGHT: 59 IN | RESPIRATION RATE: 22 BRPM | TEMPERATURE: 98 F | HEART RATE: 90 BPM | SYSTOLIC BLOOD PRESSURE: 146 MMHG | DIASTOLIC BLOOD PRESSURE: 75 MMHG | WEIGHT: 85.1 LBS | OXYGEN SATURATION: 94 %

## 2018-12-22 LAB
ALBUMIN SERPL ELPH-MCNC: 4 G/DL — SIGNIFICANT CHANGE UP (ref 3.3–5)
ALP SERPL-CCNC: 65 U/L — SIGNIFICANT CHANGE UP (ref 40–120)
ALT FLD-CCNC: 7 U/L — LOW (ref 10–45)
ANION GAP SERPL CALC-SCNC: 13 MMOL/L — SIGNIFICANT CHANGE UP (ref 5–17)
APPEARANCE UR: CLEAR — SIGNIFICANT CHANGE UP
AST SERPL-CCNC: 16 U/L — SIGNIFICANT CHANGE UP (ref 10–40)
BACTERIA # UR AUTO: NEGATIVE — SIGNIFICANT CHANGE UP
BASOPHILS # BLD AUTO: 0 K/UL — SIGNIFICANT CHANGE UP (ref 0–0.2)
BASOPHILS NFR BLD AUTO: 0.6 % — SIGNIFICANT CHANGE UP (ref 0–2)
BILIRUB SERPL-MCNC: 0.3 MG/DL — SIGNIFICANT CHANGE UP (ref 0.2–1.2)
BILIRUB UR-MCNC: NEGATIVE — SIGNIFICANT CHANGE UP
BUN SERPL-MCNC: 19 MG/DL — SIGNIFICANT CHANGE UP (ref 7–23)
CALCIUM SERPL-MCNC: 9.8 MG/DL — SIGNIFICANT CHANGE UP (ref 8.4–10.5)
CHLORIDE SERPL-SCNC: 98 MMOL/L — SIGNIFICANT CHANGE UP (ref 96–108)
CO2 SERPL-SCNC: 25 MMOL/L — SIGNIFICANT CHANGE UP (ref 22–31)
COLOR SPEC: YELLOW — SIGNIFICANT CHANGE UP
CREAT SERPL-MCNC: 0.95 MG/DL — SIGNIFICANT CHANGE UP (ref 0.5–1.3)
DIFF PNL FLD: NEGATIVE — SIGNIFICANT CHANGE UP
EOSINOPHIL # BLD AUTO: 0.1 K/UL — SIGNIFICANT CHANGE UP (ref 0–0.5)
EOSINOPHIL NFR BLD AUTO: 1.3 % — SIGNIFICANT CHANGE UP (ref 0–6)
EPI CELLS # UR: 0 /HPF — SIGNIFICANT CHANGE UP
GAS PNL BLDV: SIGNIFICANT CHANGE UP
GLUCOSE SERPL-MCNC: 110 MG/DL — HIGH (ref 70–99)
GLUCOSE UR QL: NEGATIVE — SIGNIFICANT CHANGE UP
HCT VFR BLD CALC: 40.5 % — SIGNIFICANT CHANGE UP (ref 34.5–45)
HGB BLD-MCNC: 13.1 G/DL — SIGNIFICANT CHANGE UP (ref 11.5–15.5)
HPIV2 RNA SPEC QL NAA+PROBE: DETECTED
HYALINE CASTS # UR AUTO: 0 /LPF — SIGNIFICANT CHANGE UP (ref 0–2)
KETONES UR-MCNC: NEGATIVE — SIGNIFICANT CHANGE UP
LEUKOCYTE ESTERASE UR-ACNC: NEGATIVE — SIGNIFICANT CHANGE UP
LYMPHOCYTES # BLD AUTO: 1.2 K/UL — SIGNIFICANT CHANGE UP (ref 1–3.3)
LYMPHOCYTES # BLD AUTO: 13.6 % — SIGNIFICANT CHANGE UP (ref 13–44)
MAGNESIUM SERPL-MCNC: 1.8 MG/DL — SIGNIFICANT CHANGE UP (ref 1.6–2.6)
MCHC RBC-ENTMCNC: 32.1 PG — SIGNIFICANT CHANGE UP (ref 27–34)
MCHC RBC-ENTMCNC: 32.3 GM/DL — SIGNIFICANT CHANGE UP (ref 32–36)
MCV RBC AUTO: 99.5 FL — SIGNIFICANT CHANGE UP (ref 80–100)
MONOCYTES # BLD AUTO: 0.6 K/UL — SIGNIFICANT CHANGE UP (ref 0–0.9)
MONOCYTES NFR BLD AUTO: 7.6 % — SIGNIFICANT CHANGE UP (ref 2–14)
NEUTROPHILS # BLD AUTO: 6.6 K/UL — SIGNIFICANT CHANGE UP (ref 1.8–7.4)
NEUTROPHILS NFR BLD AUTO: 76.9 % — SIGNIFICANT CHANGE UP (ref 43–77)
NITRITE UR-MCNC: NEGATIVE — SIGNIFICANT CHANGE UP
PH UR: 5.5 — SIGNIFICANT CHANGE UP (ref 5–8)
PLATELET # BLD AUTO: 368 K/UL — SIGNIFICANT CHANGE UP (ref 150–400)
POTASSIUM SERPL-MCNC: 4.3 MMOL/L — SIGNIFICANT CHANGE UP (ref 3.5–5.3)
POTASSIUM SERPL-SCNC: 4.3 MMOL/L — SIGNIFICANT CHANGE UP (ref 3.5–5.3)
PROT SERPL-MCNC: 7.2 G/DL — SIGNIFICANT CHANGE UP (ref 6–8.3)
PROT UR-MCNC: SIGNIFICANT CHANGE UP
RAPID RVP RESULT: DETECTED
RBC # BLD: 4.07 M/UL — SIGNIFICANT CHANGE UP (ref 3.8–5.2)
RBC # FLD: 12.7 % — SIGNIFICANT CHANGE UP (ref 10.3–14.5)
RBC CASTS # UR COMP ASSIST: 94 /HPF — HIGH (ref 0–4)
SODIUM SERPL-SCNC: 136 MMOL/L — SIGNIFICANT CHANGE UP (ref 135–145)
SP GR SPEC: 1.01 — SIGNIFICANT CHANGE UP (ref 1.01–1.02)
UROBILINOGEN FLD QL: NEGATIVE — SIGNIFICANT CHANGE UP
WBC # BLD: 8.5 K/UL — SIGNIFICANT CHANGE UP (ref 3.8–10.5)
WBC # FLD AUTO: 8.5 K/UL — SIGNIFICANT CHANGE UP (ref 3.8–10.5)
WBC UR QL: 1 /HPF — SIGNIFICANT CHANGE UP (ref 0–5)

## 2018-12-22 PROCEDURE — 99284 EMERGENCY DEPT VISIT MOD MDM: CPT | Mod: 25

## 2018-12-22 PROCEDURE — 87086 URINE CULTURE/COLONY COUNT: CPT

## 2018-12-22 PROCEDURE — 82803 BLOOD GASES ANY COMBINATION: CPT

## 2018-12-22 PROCEDURE — 80053 COMPREHEN METABOLIC PANEL: CPT

## 2018-12-22 PROCEDURE — 71046 X-RAY EXAM CHEST 2 VIEWS: CPT | Mod: 26

## 2018-12-22 PROCEDURE — 82947 ASSAY GLUCOSE BLOOD QUANT: CPT

## 2018-12-22 PROCEDURE — 99285 EMERGENCY DEPT VISIT HI MDM: CPT | Mod: GC,25

## 2018-12-22 PROCEDURE — 83605 ASSAY OF LACTIC ACID: CPT

## 2018-12-22 PROCEDURE — 84132 ASSAY OF SERUM POTASSIUM: CPT

## 2018-12-22 PROCEDURE — 93005 ELECTROCARDIOGRAM TRACING: CPT

## 2018-12-22 PROCEDURE — 71250 CT THORAX DX C-: CPT

## 2018-12-22 PROCEDURE — 85014 HEMATOCRIT: CPT

## 2018-12-22 PROCEDURE — 85027 COMPLETE CBC AUTOMATED: CPT

## 2018-12-22 PROCEDURE — 71046 X-RAY EXAM CHEST 2 VIEWS: CPT

## 2018-12-22 PROCEDURE — 81001 URINALYSIS AUTO W/SCOPE: CPT

## 2018-12-22 PROCEDURE — 94640 AIRWAY INHALATION TREATMENT: CPT

## 2018-12-22 PROCEDURE — 87798 DETECT AGENT NOS DNA AMP: CPT

## 2018-12-22 PROCEDURE — 93010 ELECTROCARDIOGRAM REPORT: CPT

## 2018-12-22 PROCEDURE — 84295 ASSAY OF SERUM SODIUM: CPT

## 2018-12-22 PROCEDURE — 87633 RESP VIRUS 12-25 TARGETS: CPT

## 2018-12-22 PROCEDURE — 87040 BLOOD CULTURE FOR BACTERIA: CPT

## 2018-12-22 PROCEDURE — 71250 CT THORAX DX C-: CPT | Mod: 26

## 2018-12-22 PROCEDURE — 82330 ASSAY OF CALCIUM: CPT

## 2018-12-22 PROCEDURE — 87486 CHLMYD PNEUM DNA AMP PROBE: CPT

## 2018-12-22 PROCEDURE — 87581 M.PNEUMON DNA AMP PROBE: CPT

## 2018-12-22 PROCEDURE — 82435 ASSAY OF BLOOD CHLORIDE: CPT

## 2018-12-22 PROCEDURE — 83735 ASSAY OF MAGNESIUM: CPT

## 2018-12-22 RX ORDER — IPRATROPIUM/ALBUTEROL SULFATE 18-103MCG
3 AEROSOL WITH ADAPTER (GRAM) INHALATION EVERY 6 HOURS
Qty: 0 | Refills: 0 | Status: DISCONTINUED | OUTPATIENT
Start: 2018-12-22 | End: 2018-12-26

## 2018-12-22 RX ORDER — SODIUM CHLORIDE 9 MG/ML
1000 INJECTION INTRAMUSCULAR; INTRAVENOUS; SUBCUTANEOUS ONCE
Qty: 0 | Refills: 0 | Status: COMPLETED | OUTPATIENT
Start: 2018-12-22 | End: 2018-12-22

## 2018-12-22 RX ADMIN — Medication 3 MILLILITER(S): at 11:00

## 2018-12-22 RX ADMIN — SODIUM CHLORIDE 1000 MILLILITER(S): 9 INJECTION INTRAMUSCULAR; INTRAVENOUS; SUBCUTANEOUS at 11:02

## 2018-12-22 RX ADMIN — Medication 3 MILLILITER(S): at 11:02

## 2018-12-22 NOTE — ED ADULT TRIAGE NOTE - CHIEF COMPLAINT QUOTE
Pt was admitted 2 weeks ago for Parainfluenza and d/c after 3 days, told pt she was negative for pneumonia, but was tx with abx and d/c on z-pack which she completed.  Pt now reporting 3 days of weakness, night sweats, chills, cough.  Intermittent SOB.

## 2018-12-22 NOTE — ED PROVIDER NOTE - CARE PROVIDER_API CALL
Jj William (MD), Critical Care Medicine  [address] [place] [address]  Phone: [phone]  Fax: [phone]

## 2018-12-22 NOTE — ED PROVIDER NOTE - OBJECTIVE STATEMENT
85yof w/ COPD, left lobectomy, had pseudomonas pneumonia in October which was treated and resolved, then was admitted at the beginning of this month with parainfluenza. Was treated with course of azithromycin, but only had a transient improvement in symptoms. Endorses feeling severely weak and lethargic, w/ nausea and poor appetite, as well as chills, night sweats for the past 3 days and tactile fever. Denies any chest pain, abdominal pain, vomiting, diarrhea, urinary symptoms. Use home oxygen as needed. Chronic shortness of breath currently at baseline, occasional non-productive cough.

## 2018-12-22 NOTE — ED PROVIDER NOTE - NSFOLLOWUPINSTRUCTIONS_ED_ALL_ED_FT
Follow up with Dr. William within 1-2 weeks  Continue all home medications as prescribed  Return to the ER if you have any new or worsening symptoms.

## 2018-12-22 NOTE — ED PROVIDER NOTE - PROGRESS NOTE DETAILS
Guszack: Paraflu still positive. Labs reassuring, CT unchanged from prior with no new evidence suggesting a bacterial pneumonia. Evaluated by Dr. William in the ED, comfortable w/ dc home, can follow up in the office, continue her home treatments.

## 2018-12-22 NOTE — ED PROVIDER NOTE - MEDICAL DECISION MAKING DETAILS
Worsening weakness, tactile fevers and night sweats in the setting of recent parainfluenza infection without any improvement, concern now for superimposed bacterial pneumonia vs continued exacerbation of underlying lung disease. Will repeat CT to evaluate for infiltrate, cultures due to night sweats. Afebrile here, will hold antibiotics pending labs and imaging.

## 2018-12-22 NOTE — ED PROVIDER NOTE - ATTENDING CONTRIBUTION TO CARE
85yof w/ COPD, left lobectomy, had pseudomonas pneumonia in October with recent parainfluenza virus d.c from hospital last week, with continual sts, sats 90% but uses o2 at home with no distress, ct chest unchanged, labs nl, to discuss with her pulmonoglist possible luz marina.

## 2018-12-23 LAB
CULTURE RESULTS: SIGNIFICANT CHANGE UP
SPECIMEN SOURCE: SIGNIFICANT CHANGE UP

## 2019-01-03 NOTE — PHYSICAL THERAPY INITIAL EVALUATION ADULT - NAME OF CLINICIAN

## 2019-01-11 ENCOUNTER — EMERGENCY (EMERGENCY)
Facility: HOSPITAL | Age: 84
LOS: 1 days | Discharge: HOME | End: 2019-01-11
Attending: EMERGENCY MEDICINE
Payer: MEDICARE

## 2019-01-11 VITALS
HEART RATE: 82 BPM | HEIGHT: 59 IN | DIASTOLIC BLOOD PRESSURE: 52 MMHG | RESPIRATION RATE: 17 BRPM | WEIGHT: 87.08 LBS | SYSTOLIC BLOOD PRESSURE: 155 MMHG | OXYGEN SATURATION: 97 % | TEMPERATURE: 98 F

## 2019-01-11 VITALS
DIASTOLIC BLOOD PRESSURE: 76 MMHG | SYSTOLIC BLOOD PRESSURE: 147 MMHG | HEART RATE: 86 BPM | OXYGEN SATURATION: 96 % | RESPIRATION RATE: 17 BRPM | TEMPERATURE: 98 F

## 2019-01-11 LAB
ALBUMIN SERPL ELPH-MCNC: 4 G/DL — SIGNIFICANT CHANGE UP (ref 3.3–5)
ALBUMIN SERPL ELPH-MCNC: 4.4 G/DL — SIGNIFICANT CHANGE UP (ref 3.3–5)
ALP SERPL-CCNC: 54 U/L — SIGNIFICANT CHANGE UP (ref 40–120)
ALP SERPL-CCNC: 56 U/L — SIGNIFICANT CHANGE UP (ref 40–120)
ALT FLD-CCNC: 14 U/L — SIGNIFICANT CHANGE UP (ref 10–45)
ALT FLD-CCNC: 7 U/L — LOW (ref 10–45)
ANION GAP SERPL CALC-SCNC: 12 MMOL/L — SIGNIFICANT CHANGE UP (ref 5–17)
ANION GAP SERPL CALC-SCNC: 7 MMOL/L — SIGNIFICANT CHANGE UP (ref 5–17)
AST SERPL-CCNC: 18 U/L — SIGNIFICANT CHANGE UP (ref 10–40)
AST SERPL-CCNC: 45 U/L — HIGH (ref 10–40)
BASE EXCESS BLDV CALC-SCNC: 1.9 MMOL/L — SIGNIFICANT CHANGE UP (ref -2–2)
BASOPHILS # BLD AUTO: 0.1 K/UL — SIGNIFICANT CHANGE UP (ref 0–0.2)
BASOPHILS NFR BLD AUTO: 0.9 % — SIGNIFICANT CHANGE UP (ref 0–2)
BILIRUB SERPL-MCNC: 0.3 MG/DL — SIGNIFICANT CHANGE UP (ref 0.2–1.2)
BILIRUB SERPL-MCNC: 0.5 MG/DL — SIGNIFICANT CHANGE UP (ref 0.2–1.2)
BUN SERPL-MCNC: 17 MG/DL — SIGNIFICANT CHANGE UP (ref 7–23)
BUN SERPL-MCNC: 18 MG/DL — SIGNIFICANT CHANGE UP (ref 7–23)
CA-I SERPL-SCNC: 1.13 MMOL/L — SIGNIFICANT CHANGE UP (ref 1.12–1.3)
CALCIUM SERPL-MCNC: 9 MG/DL — SIGNIFICANT CHANGE UP (ref 8.4–10.5)
CALCIUM SERPL-MCNC: 9.7 MG/DL — SIGNIFICANT CHANGE UP (ref 8.4–10.5)
CHLORIDE BLDV-SCNC: 101 MMOL/L — SIGNIFICANT CHANGE UP (ref 96–108)
CHLORIDE SERPL-SCNC: 104 MMOL/L — SIGNIFICANT CHANGE UP (ref 96–108)
CHLORIDE SERPL-SCNC: 104 MMOL/L — SIGNIFICANT CHANGE UP (ref 96–108)
CO2 BLDV-SCNC: 32 MMOL/L — HIGH (ref 22–30)
CO2 SERPL-SCNC: 23 MMOL/L — SIGNIFICANT CHANGE UP (ref 22–31)
CO2 SERPL-SCNC: 23 MMOL/L — SIGNIFICANT CHANGE UP (ref 22–31)
CREAT SERPL-MCNC: 0.74 MG/DL — SIGNIFICANT CHANGE UP (ref 0.5–1.3)
CREAT SERPL-MCNC: 0.77 MG/DL — SIGNIFICANT CHANGE UP (ref 0.5–1.3)
EOSINOPHIL # BLD AUTO: 0 K/UL — SIGNIFICANT CHANGE UP (ref 0–0.5)
EOSINOPHIL NFR BLD AUTO: 0.8 % — SIGNIFICANT CHANGE UP (ref 0–6)
GAS PNL BLDV: 131 MMOL/L — LOW (ref 136–145)
GAS PNL BLDV: SIGNIFICANT CHANGE UP
GAS PNL BLDV: SIGNIFICANT CHANGE UP
GLUCOSE BLDV-MCNC: 86 MG/DL — SIGNIFICANT CHANGE UP (ref 70–99)
GLUCOSE SERPL-MCNC: 85 MG/DL — SIGNIFICANT CHANGE UP (ref 70–99)
GLUCOSE SERPL-MCNC: 86 MG/DL — SIGNIFICANT CHANGE UP (ref 70–99)
HCO3 BLDV-SCNC: 30 MMOL/L — HIGH (ref 21–29)
HCT VFR BLD CALC: 40.3 % — SIGNIFICANT CHANGE UP (ref 34.5–45)
HCT VFR BLDA CALC: 42 % — SIGNIFICANT CHANGE UP (ref 39–50)
HGB BLD CALC-MCNC: 13.6 G/DL — SIGNIFICANT CHANGE UP (ref 11.5–15.5)
HGB BLD-MCNC: 12.8 G/DL — SIGNIFICANT CHANGE UP (ref 11.5–15.5)
LACTATE BLDV-MCNC: 1.6 MMOL/L — SIGNIFICANT CHANGE UP (ref 0.7–2)
LYMPHOCYTES # BLD AUTO: 1.5 K/UL — SIGNIFICANT CHANGE UP (ref 1–3.3)
LYMPHOCYTES # BLD AUTO: 27.3 % — SIGNIFICANT CHANGE UP (ref 13–44)
MCHC RBC-ENTMCNC: 31.3 PG — SIGNIFICANT CHANGE UP (ref 27–34)
MCHC RBC-ENTMCNC: 31.8 GM/DL — LOW (ref 32–36)
MCV RBC AUTO: 98.3 FL — SIGNIFICANT CHANGE UP (ref 80–100)
MONOCYTES # BLD AUTO: 0.5 K/UL — SIGNIFICANT CHANGE UP (ref 0–0.9)
MONOCYTES NFR BLD AUTO: 10 % — SIGNIFICANT CHANGE UP (ref 2–14)
NEUTROPHILS # BLD AUTO: 3.3 K/UL — SIGNIFICANT CHANGE UP (ref 1.8–7.4)
NEUTROPHILS NFR BLD AUTO: 60.9 % — SIGNIFICANT CHANGE UP (ref 43–77)
NT-PROBNP SERPL-SCNC: 98 PG/ML — SIGNIFICANT CHANGE UP (ref 0–300)
PCO2 BLDV: 64 MMHG — HIGH (ref 35–50)
PH BLDV: 7.29 — LOW (ref 7.35–7.45)
PLATELET # BLD AUTO: 251 K/UL — SIGNIFICANT CHANGE UP (ref 150–400)
PO2 BLDV: 28 MMHG — SIGNIFICANT CHANGE UP (ref 25–45)
POTASSIUM BLDV-SCNC: 11.2 MMOL/L — CRITICAL HIGH (ref 3.5–5.3)
POTASSIUM SERPL-MCNC: 5 MMOL/L — SIGNIFICANT CHANGE UP (ref 3.5–5.3)
POTASSIUM SERPL-MCNC: 6.6 MMOL/L — CRITICAL HIGH (ref 3.5–5.3)
POTASSIUM SERPL-SCNC: 5 MMOL/L — SIGNIFICANT CHANGE UP (ref 3.5–5.3)
POTASSIUM SERPL-SCNC: 6.6 MMOL/L — CRITICAL HIGH (ref 3.5–5.3)
PROT SERPL-MCNC: 6.4 G/DL — SIGNIFICANT CHANGE UP (ref 6–8.3)
PROT SERPL-MCNC: 7.6 G/DL — SIGNIFICANT CHANGE UP (ref 6–8.3)
RAPID RVP RESULT: SIGNIFICANT CHANGE UP
RBC # BLD: 4.11 M/UL — SIGNIFICANT CHANGE UP (ref 3.8–5.2)
RBC # FLD: 13.1 % — SIGNIFICANT CHANGE UP (ref 10.3–14.5)
SAO2 % BLDV: 44 % — LOW (ref 67–88)
SODIUM SERPL-SCNC: 134 MMOL/L — LOW (ref 135–145)
SODIUM SERPL-SCNC: 139 MMOL/L — SIGNIFICANT CHANGE UP (ref 135–145)
TROPONIN T, HIGH SENSITIVITY RESULT: 16 NG/L — SIGNIFICANT CHANGE UP (ref 0–51)
TROPONIN T, HIGH SENSITIVITY RESULT: 19 NG/L — SIGNIFICANT CHANGE UP (ref 0–51)
WBC # BLD: 5.4 K/UL — SIGNIFICANT CHANGE UP (ref 3.8–10.5)
WBC # FLD AUTO: 5.4 K/UL — SIGNIFICANT CHANGE UP (ref 3.8–10.5)

## 2019-01-11 PROCEDURE — 71045 X-RAY EXAM CHEST 1 VIEW: CPT

## 2019-01-11 PROCEDURE — 93010 ELECTROCARDIOGRAM REPORT: CPT

## 2019-01-11 PROCEDURE — 84484 ASSAY OF TROPONIN QUANT: CPT

## 2019-01-11 PROCEDURE — 82803 BLOOD GASES ANY COMBINATION: CPT

## 2019-01-11 PROCEDURE — 82435 ASSAY OF BLOOD CHLORIDE: CPT

## 2019-01-11 PROCEDURE — 80053 COMPREHEN METABOLIC PANEL: CPT

## 2019-01-11 PROCEDURE — 87581 M.PNEUMON DNA AMP PROBE: CPT

## 2019-01-11 PROCEDURE — 71045 X-RAY EXAM CHEST 1 VIEW: CPT | Mod: 26

## 2019-01-11 PROCEDURE — 99284 EMERGENCY DEPT VISIT MOD MDM: CPT | Mod: 25

## 2019-01-11 PROCEDURE — 87798 DETECT AGENT NOS DNA AMP: CPT

## 2019-01-11 PROCEDURE — 87633 RESP VIRUS 12-25 TARGETS: CPT

## 2019-01-11 PROCEDURE — 71046 X-RAY EXAM CHEST 2 VIEWS: CPT

## 2019-01-11 PROCEDURE — 93005 ELECTROCARDIOGRAM TRACING: CPT

## 2019-01-11 PROCEDURE — 84132 ASSAY OF SERUM POTASSIUM: CPT

## 2019-01-11 PROCEDURE — 83605 ASSAY OF LACTIC ACID: CPT

## 2019-01-11 PROCEDURE — 84295 ASSAY OF SERUM SODIUM: CPT

## 2019-01-11 PROCEDURE — 96360 HYDRATION IV INFUSION INIT: CPT

## 2019-01-11 PROCEDURE — 85027 COMPLETE CBC AUTOMATED: CPT

## 2019-01-11 PROCEDURE — 85014 HEMATOCRIT: CPT

## 2019-01-11 PROCEDURE — 99284 EMERGENCY DEPT VISIT MOD MDM: CPT | Mod: GC,25

## 2019-01-11 PROCEDURE — 82947 ASSAY GLUCOSE BLOOD QUANT: CPT

## 2019-01-11 PROCEDURE — 82330 ASSAY OF CALCIUM: CPT

## 2019-01-11 PROCEDURE — 87486 CHLMYD PNEUM DNA AMP PROBE: CPT

## 2019-01-11 PROCEDURE — 83880 ASSAY OF NATRIURETIC PEPTIDE: CPT

## 2019-01-11 PROCEDURE — 71046 X-RAY EXAM CHEST 2 VIEWS: CPT | Mod: 26

## 2019-01-11 RX ORDER — SODIUM CHLORIDE 9 MG/ML
500 INJECTION INTRAMUSCULAR; INTRAVENOUS; SUBCUTANEOUS ONCE
Qty: 0 | Refills: 0 | Status: COMPLETED | OUTPATIENT
Start: 2019-01-11 | End: 2019-01-11

## 2019-01-11 RX ADMIN — SODIUM CHLORIDE 500 MILLILITER(S): 9 INJECTION INTRAMUSCULAR; INTRAVENOUS; SUBCUTANEOUS at 12:53

## 2019-01-11 RX ADMIN — SODIUM CHLORIDE 500 MILLILITER(S): 9 INJECTION INTRAMUSCULAR; INTRAVENOUS; SUBCUTANEOUS at 14:00

## 2019-01-11 NOTE — ED ADULT NURSE REASSESSMENT NOTE - NS ED NURSE REASSESS COMMENT FT1
CXR and NS bolus completed. Pt provided food per MD Vivas. Awaiting repeat troponin in one hour, plan of care to d/c.

## 2019-01-11 NOTE — ED ADULT TRIAGE NOTE - CHIEF COMPLAINT QUOTE
pt c/o "nausea, during the night I feel like my heart is racing at times with tingling to arms/sweats on and off x 1 week"

## 2019-01-11 NOTE — ED PROVIDER NOTE - MEDICAL DECISION MAKING DETAILS
ATTG: : cough / recent pna / concern for recurrent infection. check xray, ekg, labs, rsv, cautious ivf hydration.

## 2019-01-11 NOTE — ED PROVIDER NOTE - NSFOLLOWUPINSTRUCTIONS_ED_ALL_ED_FT
1) Please return to the ED should you have any new or worsening symptoms, worsening pain, develop chest pain, difficulty breathing or any concerning symptoms  2) Please follow up with your primary care doctor in 2-3 days.

## 2019-01-11 NOTE — ED PROVIDER NOTE - OBJECTIVE STATEMENT
84 y/o female with PMH of COPD, Lung Cancer s/p lobectomy, HLD, PVD comes to ED w/ SOB and decreased energy. States that she has had a decreased appetite for the past few days, and recently states she is vomiting every after brushing her teeth. Denies any CP, abd pain. fevers. states was recently diagnosed w/ a pneumonia and concerned she may have a recurrence. Reports cough. Sons at bedside w/ patient. 86 y/o female with PMH of COPD, Lung Cancer s/p lobectomy, HLD, PVD comes to ED w/ SOB and decreased energy. States that she has had a decreased appetite for the past few days, and recently states she is vomiting every after brushing her teeth. Denies any CP, abd pain. fevers. states was recently diagnosed w/ a viral pneumonia and concerned she may have a recurrence. No cough. Sons at bedside w/ patient.

## 2019-01-11 NOTE — ED PROVIDER NOTE - ATTENDING CONTRIBUTION TO CARE
86 y/o f with pmhx COPD, s/p left lung lobectomy ling ca, HLD, PVD, hypothyroidism, presents with sons for eval of worsening shortness of breath, weakness and fatigue. patient also with diminished energy / diminished appetite, and not feeling well. has some numbness on both arms. no fever or chills. states she is vomiting every day after she brushes her teeth.   Gen.  no acute distress  HEENT:  PERRL dry mm, no jvd  Lungs:  b/l bs no crackles no wheezing no retractions  CVS: S1S2   Abd;  soft non tender  Ext:  full range of motion. no pitting edema  Neuro: aaox3 clear speech, no focal deficits.   MSK: muscle strength 5/5 b/l  upper and lower ext.

## 2019-01-11 NOTE — ED ADULT NURSE REASSESSMENT NOTE - NS ED NURSE REASSESS COMMENT FT1
Pt resting comfortably in stretcher, unlabored, spontaneous respirations, NAD. Awaiting dispo at this time.

## 2019-01-11 NOTE — ED ADULT NURSE NOTE - OBJECTIVE STATEMENT
86 y/o female PMH shoulder replacement, lung cancer and left lobectomy presents to ED reporting SOB, tingling in arms every night, occasional chest pounding and overall feeling "lousy." Pt reports SOB on exertion and at night, sleeps on two pillows. On exam, AOx3, speaking in complete sentences. Lung sounds CTA, NAD, O2 sat 98% RA. Abdomen soft, non-tender, non-distended, hypoactive bowel sounds in all 4 quadrants. Pt denies any SOB, pain, CP, N/V/D at this time. Heplock placed, labs sent. Pt placed on CM, NSR HR 68. Awaiting CXR at this time.

## 2019-01-11 NOTE — ED PROVIDER NOTE - SHIFT CHANGE DETAILS
Attending MD Trivedi: 85F with shortness of breath, pending repeat troponin.  If non actionable, can be discharged.  VBG discussed, nonactionable at this time.

## 2019-01-11 NOTE — ED PROVIDER NOTE - PROGRESS NOTE DETAILS
Bogdan Vivas (Resident): labs neg - d/w patient that trops were neg, no signs of pneumonia - d/w patient and sons return precautions - safe to d/c home Attending MD Trivedi: Patient re-evaluated and no acute issues at  this time.  Lab and radiology tests reviewed with patient.  Patient stable for discharge. Follow up instructions given, importance of follow up emphasized, return to ED parameters reviewed and patient verbalized understanding.  All questions answered, all concerns addressed.

## 2019-05-16 ENCOUNTER — OUTPATIENT (OUTPATIENT)
Dept: OUTPATIENT SERVICES | Facility: HOSPITAL | Age: 84
LOS: 1 days | End: 2019-05-16
Payer: MEDICARE

## 2019-05-16 ENCOUNTER — APPOINTMENT (OUTPATIENT)
Dept: RADIOLOGY | Facility: HOSPITAL | Age: 84
End: 2019-05-16
Payer: MEDICARE

## 2019-05-16 DIAGNOSIS — R13.10 DYSPHAGIA, UNSPECIFIED: ICD-10-CM

## 2019-05-16 DIAGNOSIS — Z00.00 ENCOUNTER FOR GENERAL ADULT MEDICAL EXAMINATION WITHOUT ABNORMAL FINDINGS: ICD-10-CM

## 2019-05-16 PROCEDURE — 74220 X-RAY XM ESOPHAGUS 1CNTRST: CPT

## 2019-05-16 PROCEDURE — 74220 X-RAY XM ESOPHAGUS 1CNTRST: CPT | Mod: 26

## 2019-06-18 ENCOUNTER — EMERGENCY (EMERGENCY)
Facility: HOSPITAL | Age: 84
LOS: 1 days | Discharge: HOME | End: 2019-06-18
Attending: EMERGENCY MEDICINE
Payer: MEDICARE

## 2019-06-18 VITALS
SYSTOLIC BLOOD PRESSURE: 127 MMHG | DIASTOLIC BLOOD PRESSURE: 70 MMHG | RESPIRATION RATE: 17 BRPM | OXYGEN SATURATION: 98 % | HEART RATE: 78 BPM | TEMPERATURE: 99 F

## 2019-06-18 VITALS
HEART RATE: 94 BPM | HEIGHT: 59 IN | OXYGEN SATURATION: 95 % | TEMPERATURE: 98 F | DIASTOLIC BLOOD PRESSURE: 69 MMHG | RESPIRATION RATE: 18 BRPM | WEIGHT: 85.1 LBS | SYSTOLIC BLOOD PRESSURE: 146 MMHG

## 2019-06-18 LAB
ALBUMIN SERPL ELPH-MCNC: 3.8 G/DL — SIGNIFICANT CHANGE UP (ref 3.3–5)
ALP SERPL-CCNC: 89 U/L — SIGNIFICANT CHANGE UP (ref 40–120)
ALT FLD-CCNC: 14 U/L — SIGNIFICANT CHANGE UP (ref 10–45)
ANION GAP SERPL CALC-SCNC: 12 MMOL/L — SIGNIFICANT CHANGE UP (ref 5–17)
AST SERPL-CCNC: 14 U/L — SIGNIFICANT CHANGE UP (ref 10–40)
BASOPHILS # BLD AUTO: 0 K/UL — SIGNIFICANT CHANGE UP (ref 0–0.2)
BASOPHILS NFR BLD AUTO: 0.6 % — SIGNIFICANT CHANGE UP (ref 0–2)
BILIRUB SERPL-MCNC: 0.4 MG/DL — SIGNIFICANT CHANGE UP (ref 0.2–1.2)
BUN SERPL-MCNC: 23 MG/DL — SIGNIFICANT CHANGE UP (ref 7–23)
CALCIUM SERPL-MCNC: 9.6 MG/DL — SIGNIFICANT CHANGE UP (ref 8.4–10.5)
CHLORIDE SERPL-SCNC: 104 MMOL/L — SIGNIFICANT CHANGE UP (ref 96–108)
CO2 SERPL-SCNC: 25 MMOL/L — SIGNIFICANT CHANGE UP (ref 22–31)
CREAT SERPL-MCNC: 0.85 MG/DL — SIGNIFICANT CHANGE UP (ref 0.5–1.3)
EOSINOPHIL # BLD AUTO: 0.3 K/UL — SIGNIFICANT CHANGE UP (ref 0–0.5)
EOSINOPHIL NFR BLD AUTO: 3.4 % — SIGNIFICANT CHANGE UP (ref 0–6)
GAS PNL BLDV: SIGNIFICANT CHANGE UP
GLUCOSE SERPL-MCNC: 100 MG/DL — HIGH (ref 70–99)
HCT VFR BLD CALC: 38.5 % — SIGNIFICANT CHANGE UP (ref 34.5–45)
HGB BLD-MCNC: 12.5 G/DL — SIGNIFICANT CHANGE UP (ref 11.5–15.5)
LIDOCAIN IGE QN: 31 U/L — SIGNIFICANT CHANGE UP (ref 7–60)
LYMPHOCYTES # BLD AUTO: 1.4 K/UL — SIGNIFICANT CHANGE UP (ref 1–3.3)
LYMPHOCYTES # BLD AUTO: 16.4 % — SIGNIFICANT CHANGE UP (ref 13–44)
MCHC RBC-ENTMCNC: 32.5 GM/DL — SIGNIFICANT CHANGE UP (ref 32–36)
MCHC RBC-ENTMCNC: 32.7 PG — SIGNIFICANT CHANGE UP (ref 27–34)
MCV RBC AUTO: 101 FL — HIGH (ref 80–100)
MONOCYTES # BLD AUTO: 0.7 K/UL — SIGNIFICANT CHANGE UP (ref 0–0.9)
MONOCYTES NFR BLD AUTO: 8.2 % — SIGNIFICANT CHANGE UP (ref 2–14)
NEUTROPHILS # BLD AUTO: 6.2 K/UL — SIGNIFICANT CHANGE UP (ref 1.8–7.4)
NEUTROPHILS NFR BLD AUTO: 71.4 % — SIGNIFICANT CHANGE UP (ref 43–77)
PLATELET # BLD AUTO: 297 K/UL — SIGNIFICANT CHANGE UP (ref 150–400)
POTASSIUM SERPL-MCNC: 4.6 MMOL/L — SIGNIFICANT CHANGE UP (ref 3.5–5.3)
POTASSIUM SERPL-SCNC: 4.6 MMOL/L — SIGNIFICANT CHANGE UP (ref 3.5–5.3)
PROT SERPL-MCNC: 6.8 G/DL — SIGNIFICANT CHANGE UP (ref 6–8.3)
RBC # BLD: 3.83 M/UL — SIGNIFICANT CHANGE UP (ref 3.8–5.2)
RBC # FLD: 12.1 % — SIGNIFICANT CHANGE UP (ref 10.3–14.5)
SODIUM SERPL-SCNC: 141 MMOL/L — SIGNIFICANT CHANGE UP (ref 135–145)
TROPONIN T, HIGH SENSITIVITY RESULT: 19 NG/L — SIGNIFICANT CHANGE UP (ref 0–51)
TROPONIN T, HIGH SENSITIVITY RESULT: 19 NG/L — SIGNIFICANT CHANGE UP (ref 0–51)
WBC # BLD: 8.7 K/UL — SIGNIFICANT CHANGE UP (ref 3.8–10.5)
WBC # FLD AUTO: 8.7 K/UL — SIGNIFICANT CHANGE UP (ref 3.8–10.5)

## 2019-06-18 PROCEDURE — 83690 ASSAY OF LIPASE: CPT

## 2019-06-18 PROCEDURE — 76705 ECHO EXAM OF ABDOMEN: CPT | Mod: 26,RT

## 2019-06-18 PROCEDURE — 82435 ASSAY OF BLOOD CHLORIDE: CPT

## 2019-06-18 PROCEDURE — 71046 X-RAY EXAM CHEST 2 VIEWS: CPT | Mod: 26

## 2019-06-18 PROCEDURE — 36415 COLL VENOUS BLD VENIPUNCTURE: CPT

## 2019-06-18 PROCEDURE — 93005 ELECTROCARDIOGRAM TRACING: CPT

## 2019-06-18 PROCEDURE — 93010 ELECTROCARDIOGRAM REPORT: CPT

## 2019-06-18 PROCEDURE — 99285 EMERGENCY DEPT VISIT HI MDM: CPT | Mod: GC,25

## 2019-06-18 PROCEDURE — 84132 ASSAY OF SERUM POTASSIUM: CPT

## 2019-06-18 PROCEDURE — 84295 ASSAY OF SERUM SODIUM: CPT

## 2019-06-18 PROCEDURE — 99284 EMERGENCY DEPT VISIT MOD MDM: CPT | Mod: 25

## 2019-06-18 PROCEDURE — 85027 COMPLETE CBC AUTOMATED: CPT

## 2019-06-18 PROCEDURE — 82803 BLOOD GASES ANY COMBINATION: CPT

## 2019-06-18 PROCEDURE — 85014 HEMATOCRIT: CPT

## 2019-06-18 PROCEDURE — 80053 COMPREHEN METABOLIC PANEL: CPT

## 2019-06-18 PROCEDURE — 71046 X-RAY EXAM CHEST 2 VIEWS: CPT

## 2019-06-18 PROCEDURE — 76705 ECHO EXAM OF ABDOMEN: CPT

## 2019-06-18 PROCEDURE — 83605 ASSAY OF LACTIC ACID: CPT

## 2019-06-18 PROCEDURE — 74177 CT ABD & PELVIS W/CONTRAST: CPT

## 2019-06-18 PROCEDURE — 74177 CT ABD & PELVIS W/CONTRAST: CPT | Mod: 26

## 2019-06-18 PROCEDURE — 84484 ASSAY OF TROPONIN QUANT: CPT

## 2019-06-18 PROCEDURE — 82330 ASSAY OF CALCIUM: CPT

## 2019-06-18 PROCEDURE — 82947 ASSAY GLUCOSE BLOOD QUANT: CPT

## 2019-06-18 NOTE — ED PROVIDER NOTE - CLINICAL SUMMARY MEDICAL DECISION MAKING FREE TEXT BOX
85y female with intermittent epigastric and RUQ pain, with nausea and vomiting.  DDx: biliary colic, PUD, acs, pancreatitis. Will get cbc, cmp, lipase, RUQ US, cxr, ekg, trop.

## 2019-06-18 NOTE — ED ADULT NURSE NOTE - CHPI ED NUR SYMPTOMS NEG
no fever/no diarrhea/no chills/no dysuria/no hematuria/no blood in stool/no abdominal distension/no burning urination

## 2019-06-18 NOTE — ED ADULT NURSE NOTE - NSIMPLEMENTINTERV_GEN_ALL_ED
Implemented All Fall Risk Interventions:  [place] to call system. Call bell, personal items and telephone within reach. Instruct patient to call for assistance. Room bathroom lighting operational. Non-slip footwear when patient is off stretcher. Physically safe environment: no spills, clutter or unnecessary equipment. Stretcher in lowest position, wheels locked, appropriate side rails in place. Provide visual cue, wrist band, yellow gown, etc. Monitor gait and stability. Monitor for mental status changes and reorient to person, place, and time. Review medications for side effects contributing to fall risk. Reinforce activity limits and safety measures with patient and family.

## 2019-06-18 NOTE — ED PROVIDER NOTE - ATTENDING CONTRIBUTION TO CARE
I performed a history and physical exam of the patient and discussed their management with the resident. I reviewed the resident's note and agree with the documented findings and plan of care, except if noted below. My medical decision making and observations can be found be found below.    84 yo female presents with epigastric and ruq pain x months but had episode yesterday that was severe and came to ED. No fevers. No chest pain. On exam, well appearing, nad, mild epigastric ttp, no rebound or guarding, no le edema. Will check labs, RUQ US and possibly CT scan, ekg cxr, reevaluate.

## 2019-06-18 NOTE — ED ADULT NURSE NOTE - OBJECTIVE STATEMENT
86 yo F aaox4 c/o of Epigastric pain Reporting abdominal pain PMH Hypothyroid, COPD, Lung CA in Remission. Onset of pain couple months ago, Pain is non radiating and intermittent. + Nausea, vomiting x1. No  distress. No CP dizziness weakness or SOB. BS sounds + All 4Q Abdomen soft, nontender, nondistended. Last BM yesterday Denies weight loss or loss of appetite. Provider at bedside. Labs drawn and sent. IV line placed.

## 2019-06-18 NOTE — ED PROVIDER NOTE - PROGRESS NOTE DETAILS
Yaya Sorensen, PGY-1 EM: discussed results of CT scan with patient, she would prefer to get work up for mass with [facility], also at her request called her son to discuss results with him.

## 2019-06-18 NOTE — ED PROVIDER NOTE - NS ED ROS FT
Gen: No fever, normal appetite  ENT: No congestion, sore throat  Resp: No cough or trouble breathing  Cardiovascular: No chest pain or palpitation  Gastroenteric: nausea and vomiting, epigastric and RUQ pain  :  No change in urine output; no dysuria  MS: No joint or muscle pain  Skin: No rashes  Neuro: No headache; no abnormal movements  Remainder negative, except as per the HPI

## 2019-06-18 NOTE — ED PROVIDER NOTE - OBJECTIVE STATEMENT
85y female with PMH of hypothyroid, COPD, lung CA s/p lobectomy, PVD presenting with epigastric and RUQ pain radiating to the back, nausea and vomiting.  Symptoms have been intermittent over the past couple of months, pain is associated with eating, but can occur at with out. Symptoms occurred last night, were relieved with pepcid.  Pain occurred again this morning, she had cold sweats, RUQ pain radiating to the back, nausea and one episode of vomiting. She is being evaluated by her GI for dysphagia, had a swallowing test last week. Patient states that she does not have difficulty swallowing.

## 2019-06-18 NOTE — ED PROVIDER NOTE - NSFOLLOWUPINSTRUCTIONS_ED_ALL_ED_FT
Please follow up with your regular doctor and discuss the findings of the CT scan which showed a pancreatic mass and liver masses. These are possibly cancerous masses and need immediate follow up.    All lab work and imaging has been printed and provided to you in the discharge paper work.    Please return to the Emergency Department if you have any new or worsening symptoms.

## 2019-11-21 NOTE — ED PROVIDER NOTE - CPE EDP ENMT NORM
Called, spoke to pt. Two pt identifiers confirmed. Pt informed per Dr. Lien Issa augmentin ordered. Pt asking for albuterol inhaler and cough medication. Pt informed per Dr. Lien Issa that both will be sent in. Pt informed per Dr. Lien Issa that if sx continue, f/u w/ Dr. Dewey Garcia. Pt verbalized understanding of information discussed w/ no further questions at this time. normal...

## 2020-02-27 NOTE — ED ADULT TRIAGE NOTE - WEIGHT IN LBS
IP Acute Occupational Therapy Evaluation  Plan of Care Note    Assessment: The patient presents to Occupational Therapy s/p right ankle pain.  The patient presents with impairments in mobility, and the patient is currently functioning at mod assist with ADLs.   The patient needs to be at a modified independent level for safe return to prior living situation.  The patient will benefit from continued skilled OT to address these deficits.     Recommendations and Plan:     Recommendations for Discharge: OT WI: Sub-acute nursing home;Home (02/27/20 0940)      Below is key objective and subjective information from the last 24 hours.  For further details and goals, please refer to the OT Assess/Treat/Goals flowsheet.    Diagnosis:  1. Acute right ankle pain        Precautions:  Precautions  Weight Bearing Status: Weight bearing as tolerated right lower extremity (02/27/20 0931)  Weight Bearing Status Comments: At this time patient unwilling to WB through RLE due to increased reported pain (02/27/20 0931)  Other Precautions: fall (02/27/20 0940)    Prior Living Situation:  Type of Home: Mobile home (02/27/20 0931)  Home Layout: One level;Ramped entrance (02/27/20 0931)  Lives With: Son (02/27/20 0931)  Receives Help From: Family (02/27/20 0931)  Bathroom Equipment: Grab bars in shower;Toilet riser with arms (02/27/20 0931)    Communication/Cognition:  Cognition Comments: WFL (02/27/20 0940)    ADLs:  Self Cares/ADL's  Self Cares/ADL's Comments #1: functional reaching and mobility at edge of bed for ADL completion.  (02/27/20 0940)    Household Mobility:  Household Mobility  Sitting - Static: Independent (02/27/20 0940)  Sitting - Dynamic: Independent (02/27/20 0940)      Interventions:  Interventions  Interventions Comments: Eval (02/27/20 0940)    Education:   On this date, the patient was educated on OT plan of care.    The response to education was: Verbalizes understanding.      Goals:  Goals  Short Term Goals Are The  Same as Discharge Goals: Yes (02/27/20 0940)  Goal Agreement: Patient agrees with goals and treatment plan (02/27/20 0940)  Bathing Discharge Goal 1: Patient will stand at sink to wash hands.  (02/27/20 0940)    Interventions and Treatment Time:     OT Time Spent: 8 minutes (02/27/20 0940)    Co-morbidities:   Patient Active Problem List   Diagnosis   • Anatomical narrow angle   • Combined forms of age-related cataract of both eyes   • Hyperopia of both eyes with astigmatism and presbyopia   • Gastroesophageal reflux disease   • Stroke (CMS/HCC)   • Longstanding persistent atrial fibrillation   • Warfarin anticoagulation   • Hemarthrosis of right knee   • Fall at home   • Traumatic hematoma of right wrist   • Macrocytosis without anemia   • Carotid artery disease (CMS/HCC)   • Dyslipidemia   • Closed traumatic nondisplaced fracture of distal end of right radius   • Acquired hypothyroidism   • Probable sprain of right ankle   • Inability to ambulate due to right ankle or foot       Task Modification: clinical decision making of moderate complexity, minimal to moderate task modification   91.9
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
## 2021-03-26 NOTE — ED PROVIDER NOTE - RESPIRATORY [+], MLM
- DO NOT REPLY / DO NOT REPLY ALL --  -- Message is from the [facility]--    COVID-19 Universal Screening: N/A - Not about scheduling    General Patient Message      Reason for Call: the patient has less than a week left, thanks     RX Name and Dose: insulin lispro protamine-insulin lispro (HumaLOG Mix 75/25 KwikPen) (75-25) 100 UNIT/ML pen-injector 40 Units, Subcutaneous, 2 TIMES DAILY BEFORE MEALS      Summary: Inject 40 Units into the skin 2 times daily (before meals). Prime 2 units before each dose.  Normal, Subcutaneous, 2 TIMES DAILY BEFORE MEALS, Disp-60 mL,R-11 Prime 2 units before each dose. This is for insulin pen. Dispense quantity is 15 mL per box (package).  Dose, Frequency: 40 Units, 2 TIMES DAILY BEFORE MEALS        Duration: 90 days     Could not locate pharmacy in [facility].  Pharmacy name: [facility] phone: [phone]; [place] [address]     Fax [phone] Fax# [phone]  Please contact patient    Caller Information       Type Contact Phone    03/26/2021 09:29 AM CDT Phone (Incoming) Eleuterio Mi (Self) [phone] (M)          Alternative phone number:     Turnaround time given to caller:   \"This message will be sent to [state Provider's name]. The clinical team will fulfill your request as soon as they review your message.\"         COUGH/SHORTNESS OF BREATH

## 2021-12-27 NOTE — DISCHARGE NOTE ADULT - CAREGIVER ADDRESS
You can access the FollowMyHealth Patient Portal offered by [facility] by registering at the following website: [email or web address]. By joining [facility]’s FollowMyHealth portal, you will also be able to view your health information using other applications (apps) compatible with our system.
[address] [facility] [address]

## 2022-08-22 NOTE — CONSULT NOTE ADULT - SUBJECTIVE AND OBJECTIVE BOX
"CHIEF COMPLAINT  Chief Complaint   Patient presents with    Pregnancy     \"13 or 14 weeks\"    T-5000 GLF       HPI  Kristal Cesar is a 26 y.o. female who presents evaluation of the left lower quadrant abdominal pain after mechanical fall yesterday.  Patient is  A1 at 14 weeks gestation via ultrasound dating.  States she was cleaning the bathroom yesterday when she slipped on the floor causing her to strike her left abdomen on the ground.  Did strike her head though there was no LOC and no episodes of vomiting.  Had persistent abdominal pain today so she called her OBs office was told to come to the ER for evaluation.  Does follow with high risk due to her prior history of a spontaneous .  No vaginal bleeding vaginal cramping or abnormal vaginal discharge no nausea or vomiting.  No other complaints.  REVIEW OF SYSTEMS  See HPI for further details. All other systems are negative.     PAST MEDICAL HISTORY   has a past medical history of Allergy, Anxiety, Depression, and Obesity (BMI 30-39.9).    SOCIAL HISTORY  Social History     Tobacco Use    Smoking status: Never    Smokeless tobacco: Never   Substance and Sexual Activity    Alcohol use: No    Drug use: No    Sexual activity: Yes     Partners: Male       SURGICAL HISTORY   has a past surgical history that includes ariana by laparoscopy (3/7/2016); other; and dilation and curettage (N/A, 2022).    CURRENT MEDICATIONS  Home Medications       Reviewed by Winifred Soriano R.N. (Registered Nurse) on 22 at 1511  Med List Status: Partial     Medication Last Dose Status   albuterol 108 (90 Base) MCG/ACT Aero Soln inhalation aerosol  Active   buPROPion (WELLBUTRIN XL) 150 MG XL tablet  Active   escitalopram (LEXAPRO) 10 MG Tab  Active   mupirocin (BACTROBAN) 2 % Ointment  Active   Prenatal Vit-Fe Fumarate-FA (PRENATAL VITAMINS) 28-0.8 MG Tab  Active   triamcinolone acetonide (KENALOG) 0.1 % Ointment  Active              " "      ALLERGIES  Allergies   Allergen Reactions    Other Environmental Rash     \"dogs\" - \"my skin turns red\"       FAMILY HISTORY  No pertinent family history    PHYSICAL EXAM   /59   Pulse 70   Temp 36.3 °C (97.3 °F) (Temporal)   Resp 19   Wt 56.7 kg (125 lb)   LMP  (LMP Unknown)   SpO2 97%   BMI 24.41 kg/m²  @FRANSICO[[id number]::@   Pulse ox interpretation: I interpret this pulse ox as normal.  VITALS - vital signs documented prior to this note have been reviewed and noted,  GENERAL - awake, alert, oriented, GCS 15, no apparent distress, non-toxic  appearing  HEENT - normocephalic, atraumatic, pupils equal, sclera anicteric, mucus  membranes moist  NECK - supple, no meningismus, full active range of motion, trachea midline  CARDIOVASCULAR - regular rate/rhythm, no murmurs/gallops/rubs  PULMONARY - no respiratory distress, speaking in full sentences, clear to  auscultation bilaterally, no wheezing/ronchi/rales, no accessory muscle use  GASTROINTESTINAL -mild tenderness upon deep palpation of the left lower quadrant otherwise does have mild tenderness with palpation of the left lower quadrant.  Soft, non-tender, non-distended, no rebound, guarding,  or peritonitis  GENITOURINARY - Deferred  NEUROLOGIC - Awake alert, normal mental status, speech fluid, cognition  normal, moves all extremities  MUSCULOSKELETAL - no obvious asymmetry or deformities present  EXTREMITIES - warm, well-perfused, no cyanosis or significant edema  DERMATOLOGIC - warm, dry, no rashes, no jaundice  PSYCHIATRIC - normal affect, normal insight, normal concentration          LABS      Labs Reviewed   CBC WITH DIFFERENTIAL - Abnormal; Notable for the following components:       Result Value    RBC 4.14 (*)     Hematocrit 34.5 (*)     All other components within normal limits   URINALYSIS - Abnormal; Notable for the following components:    Ketones Trace (*)     Leukocyte Esterase Trace (*)     All other components within normal limits   URINE " MICROSCOPIC (W/UA) - Abnormal; Notable for the following components:    Bacteria Few (*)     Hyaline Cast 3-5 (*)     All other components within normal limits   COMP METABOLIC PANEL   HCG QUANTITATIVE         Pertinent Labs & Imaging studies reviewed. (See chart for details)    RADIOLOGY  US-OB 1ST TRIMESTER WITH TRANSVAGINAL (COMBO)    (Results Pending)             ED COURSE/PROCEDURES          Medications - No data to display            MEDICAL DECISION MAKING    Patient presented for evaluation of abdominal pain after a mechanical fall yesterday.  Differential included was not limited to placental abruption, skin skeletal pain musculoskeletal contusion subchorionic hemorrhage among many other considerations.  Did strike her head but no LOC she is Dixon CT head rule negative.  Labs were obtained were nonactionable.  Ultrasound was obtained given the history of fall to evaluate for possible placental abruption which was negative.  Pain is controlled in the ER.  Reevaluation at 1729 patient is resting comfortably no acute distress and her repeat abdominal exam is reassuring thus I considered discharge reasonable.  She is instructed on symptomatic care.  And close OB/GYN follow-up.  All pertinent return precautions were discussed and she was discharged in a stable condition.       FINAL IMPRESSION  fall  2. Abdominal pain in pregnancy  3 closed head injury          Electronically signed by: Art Little D.O., 8/22/2022 4:46 PM      Dictation Disclaimer  Please note this report has been produced using speech recognition software and  may contain errors related to that system, including errors seen in grammar,  punctuation and spelling, as well as words and phrases that may be inappropriate.  If there are any questions or concerns, please feel free to contact the dictating  physician for clarification.     Patient is a 84y old  Female who presents with a chief complaint of 'I feel like I was hit by a truck' (03 Oct 2018 13:07)      HPI:  83yo F pmh COPD, lung cancer s/p lobectomy 13 years ago, PVD, hypothyroid, spinal stenosis presents feeling like she's 'been hit by a truck.'  She was in her usual state of health until 1 week PTA when she noted URI symptoms of cough/sneeze/dyspnea.  She went to an urgent care, was swabbed for flu which was negative, told she did not have pna, and was given zpac which she finished on Thursday PTA.  She felt no improvement and went to her PCP who prescribed doxycycline.  Since starting the abx, she has c/o daily nausea/vomiting.  Also reports night sweats/chills.  She denies CP/palpitations but feels overwhelmingly weak.    In the ED, EKG was concerning for JASMIN in 1 lead (not contiguous) and subsequent TTE with wall motion abnormalities: asa 324mg, plavix 300mg x 2, heparin gtt ACS protocol. (30 Sep 2018 19:17)    Above reviewed. She has had a URI for 2 weeks and took 2 zpacs then doxycycline and still did not improve. Her  and son had similar symptoms.    PAST MEDICAL & SURGICAL HISTORY:  PVD (peripheral vascular disease): S/P ;ower ext stents more than 10 years  Fall: S/P fall with 3 RT rib fractures 12/30/2013  Pneumonia: Nov 2013  High cholesterol  Peripheral Vascular Disease  Adult Hypothyroidism  COPD Exacerbation  H/O: Lung Cancer  S/P Laminectomy with Spinal Fusion  S/P Shoulder Surgery  S/P Lobectomy of Lung      Allergies  Levaquin (Intolerance-GI upset)  predniSONE (Unknown)  tetanus immune globulin (Rash)  tetnus (Rash)        ANTIMICROBIALS:  meropenem  IVPB 1000 once  meropenem  IVPB        OTHER MEDS: MEDICATIONS  (STANDING):  acetaminophen   Tablet .. 500 every 6 hours PRN  ALBUTerol/ipratropium for Nebulization 3 every 6 hours  aspirin enteric coated 81 daily  buDESOnide 160 MICROgram(s)/formoterol 4.5 MICROgram(s) Inhaler 2 two times a day  guaiFENesin ER 1200 every 12 hours  heparin  Injectable 5000 every 12 hours  influenza   Vaccine 0.5 once  levothyroxine 75 daily  metoprolol tartrate 25 two times a day  ondansetron Injectable 4 every 6 hours PRN  senna 2 at bedtime      SOCIAL HISTORY:     quit tobacco after lung cancer diagnosis 13 years ago, no etoh/drugs  retired   lives with  in apartment  has aide 10am-4pm 5days/week    FAMILY HISTORY:  No pertinent family history in first degree relatives      REVIEW OF SYSTEMS    [x ] All other systems negative except as noted below:	    Constitutional:  [ ] fever [ ] chills  [ ] weight loss  [x ] weakness  Skin:  [ ] rash [ ] phlebitis	  Eyes: [ ] icterus [ ] pain  [ ] discharge	  ENMT: [ ] sore throat  [ ] thrush [ ] ulcers [ ] exudates  Respiratory: [x ] dyspnea [ ] hemoptysis [x ] cough [ ] sputum	  Cardiovascular:  [x ] chest pain [ ] palpitations [ ] edema	  Gastrointestinal:  [ ] nausea [ ] vomiting [ ] diarrhea [ ] constipation [ ] pain	  Genitourinary:  [ ] dysuria [ ] frequency [ ] hematuria [ ] discharge [ ] flank pain  [ ] incontinence  Musculoskeletal:  [ ] myalgias [ ] arthralgias [ ] arthritis  [ ] back pain  Neurological:  [ ] headache [ ] seizures  [ ] confusion/altered mental status  Psychiatric:  [ ] anxiety [ ] depression	  Hematology/Lymphatics:  [ ] lymphadenopathy  Endocrine:  [ ] adrenal [ ] thyroid  Allergic/Immunologic:	 [ ] transplant [ ] seasonal    Vital Signs Last 24 Hrs  T(F): 97.6 (10-03-18 @ 12:46), Max: 99 (09-30-18 @ 19:17)    Vital Signs Last 24 Hrs  HR: 77 (10-03-18 @ 12:46) (72 - 91)  BP: 100/61 (10-03-18 @ 12:46) (91/48 - 119/68)  RR: 18 (10-03-18 @ 12:46)  SpO2: 97% (10-03-18 @ 12:46) (92% - 97%)  Wt(kg): --    PHYSICAL EXAM:  General: non-toxic  HEAD/EYES: anicteric, PERRL  ENT:  supple  Cardiovascular:   S1, S2  Respiratory:  RLL Rales   GI:  soft, non-tender, normal bowel sounds  :  no CVA tenderness   Musculoskeletal:  no synovitis  Neurologic:  grossly non-focal, alert and oriented x3   Skin:  no rash  Lymph: no lymphadenopathy  Psychiatric:  appropriate affect  Vascular:  no phlebitis                          12.5   11.21 )-----------( 262      ( 03 Oct 2018 12:58 )             40.2       10-02    139  |  103  |  23  ----------------------------<  116<H>  4.4   |  24  |  1.03    Ca    9.1      02 Oct 2018 05:12  Phos  2.8     10-02  Mg     1.8     10-02        MICROBIOLOGY:    Culture - Sputum (collected 10-02-18 @ 02:18)  Source: .Sputum Sputum  Gram Stain (10-02-18 @ 12:09):    Numerous polymorphonuclear leukocytes    Few Squamous epithelial cells per low power field    Numerous Gram Negative Rods    Numerous Gram positive cocci in pairs, chains and clusters per oil power    field  Preliminary Report (10-02-18 @ 18:25):    Moderate Pseudomonas aeruginosa    Normal Respiratory Vijaya present      Rapid RVP Result: Detected (09-30 @ 22:43)  Rapid Respiratory Viral Panel (09.30.18 @ 22:43)  Entero/Rhinovirus (RapRVP): Detected      RADIOLOGY:  CT Chest No Cont (10.03.18 @ 11:20) >  IMPRESSION:   1.  Right lower lung bronchopneumonia. Recommend follow-up imaging in 4   weeks after treatment to ensure resolution.  2.  Small right pleural effusion. Patient is a 84y old  Female who presents with a chief complaint of 'I feel like I was hit by a truck' (03 Oct 2018 13:07)      HPI:  83yo F pmh COPD, lung cancer s/p lobectomy 13 years ago, PVD, hypothyroid, spinal stenosis presents feeling like she's 'been hit by a truck.'  She was in her usual state of health until 1 week PTA when she noted URI symptoms of cough/sneeze/dyspnea.  She went to an urgent care, was swabbed for flu which was negative, told she did not have pna, and was given zpac which she finished on Thursday PTA.  She felt no improvement and went to her PCP who prescribed doxycycline.  Since starting the abx, she has c/o daily nausea/vomiting.  Also reports night sweats/chills.  She denies CP/palpitations but feels overwhelmingly weak.    In the ED, EKG was concerning for JASMIN in 1 lead (not contiguous) and subsequent TTE with wall motion abnormalities: asa 324mg, plavix 300mg x 2, heparin gtt ACS protocol. (30 Sep 2018 19:17)    Above reviewed. She has had a URI for 2 weeks and took 2 zpacs then doxycycline and still did not improve. Her  and son had similar symptoms.  Currently continues to have cough, unable to bring it up, still feels SOB and fatigued. No pleuritic chest pain.       PAST MEDICAL & SURGICAL HISTORY:  PVD (peripheral vascular disease): S/P ;ower ext stents more than 10 years  Fall: S/P fall with 3 RT rib fractures 12/30/2013  Pneumonia: Nov 2013  High cholesterol  Peripheral Vascular Disease  Adult Hypothyroidism  COPD Exacerbation  H/O: Lung Cancer  S/P Laminectomy with Spinal Fusion  S/P Shoulder Surgery  S/P Lobectomy of Lung      Allergies  Levaquin (Intolerance-GI upset)  predniSONE (Unknown)  tetanus immune globulin (Rash)  tetnus (Rash)        ANTIMICROBIALS:  meropenem  IVPB 1000 once  meropenem  IVPB        OTHER MEDS: MEDICATIONS  (STANDING):  acetaminophen   Tablet .. 500 every 6 hours PRN  ALBUTerol/ipratropium for Nebulization 3 every 6 hours  aspirin enteric coated 81 daily  buDESOnide 160 MICROgram(s)/formoterol 4.5 MICROgram(s) Inhaler 2 two times a day  guaiFENesin ER 1200 every 12 hours  heparin  Injectable 5000 every 12 hours  influenza   Vaccine 0.5 once  levothyroxine 75 daily  metoprolol tartrate 25 two times a day  ondansetron Injectable 4 every 6 hours PRN  senna 2 at bedtime      SOCIAL HISTORY:     quit tobacco after lung cancer diagnosis 13 years ago, no etoh/drugs  retired   lives with  in apartment  has aide 10am-4pm 5days/week    FAMILY HISTORY:  No family history of lung cancer.       REVIEW OF SYSTEMS    [x ] All other systems negative except as noted below:	    Constitutional:  [ ] fever [ ] chills  [ ] weight loss  [x ] weakness  Skin:  [ ] rash [ ] phlebitis	  Eyes: [ ] icterus [ ] pain  [ ] discharge	  ENMT: [ ] sore throat  [ ] thrush [ ] ulcers [ ] exudates  Respiratory: [x ] dyspnea [ ] hemoptysis [x ] cough [ ] sputum	  Cardiovascular:  [x ] chest pain [ ] palpitations [ ] edema	  Gastrointestinal:  [ ] nausea [ ] vomiting [ ] diarrhea [ ] constipation [ ] pain	  Genitourinary:  [ ] dysuria [ ] frequency [ ] hematuria [ ] discharge [ ] flank pain  [ ] incontinence  Musculoskeletal:  [ ] myalgias [ ] arthralgias [ ] arthritis  [ ] back pain  Neurological:  [ ] headache [ ] seizures  [ ] confusion/altered mental status  Psychiatric:  [ ] anxiety [ ] depression	  Endocrine:  [ ] adrenal [ ] thyroid  Allergic/Immunologic:	 [ ] transplant [ ] seasonal    Vital Signs Last 24 Hrs  T(F): 97.6 (10-03-18 @ 12:46), Max: 99 (09-30-18 @ 19:17)    Vital Signs Last 24 Hrs  HR: 77 (10-03-18 @ 12:46) (72 - 91)  BP: 100/61 (10-03-18 @ 12:46) (91/48 - 119/68)  RR: 18 (10-03-18 @ 12:46)  SpO2: 97% (10-03-18 @ 12:46) (92% - 97%)      PHYSICAL EXAM:  General: non-toxic  HEAD/EYES: anicteric, PERRL  ENT:  supple  Cardiovascular:   S1, S2 normal, + murmur.   Respiratory:  RLL Rales   GI:  soft, non-tender, normal bowel sounds  :  no CVA tenderness   Musculoskeletal:  no synovitis  Neurologic:  grossly non-focal, alert and oriented x3   Skin:  no rash  Psychiatric:  appropriate affect  Vascular:  no phlebitis                          12.5   11.21 )-----------( 262      ( 03 Oct 2018 12:58 )             40.2       10-02    139  |  103  |  23  ----------------------------<  116<H>  4.4   |  24  |  1.03    Ca    9.1      02 Oct 2018 05:12  Phos  2.8     10-02  Mg     1.8     10-02        MICROBIOLOGY:    Culture - Sputum (collected 10-02-18 @ 02:18)  Source: .Sputum Sputum  Gram Stain (10-02-18 @ 12:09):    Numerous polymorphonuclear leukocytes    Few Squamous epithelial cells per low power field    Numerous Gram Negative Rods    Numerous Gram positive cocci in pairs, chains and clusters per oil power    field  Preliminary Report (10-02-18 @ 18:25):    Moderate Pseudomonas aeruginosa    Normal Respiratory Vijaya present      Rapid RVP Result: Detected (09-30 @ 22:43)  Rapid Respiratory Viral Panel (09.30.18 @ 22:43)  Entero/Rhinovirus (RapRVP): Detected      RADIOLOGY: Imaging reviewed personally.   CT Chest No Cont (10.03.18 @ 11:20) >  IMPRESSION:   1.  Right lower lung bronchopneumonia. Recommend follow-up imaging in 4   weeks after treatment to ensure resolution.  2.  Small right pleural effusion.

## 2023-06-27 NOTE — PATIENT PROFILE ADULT - NSPROHMONCCHEMOGIVEN_GEN_A_NUR
Quality 111:Pneumonia Vaccination Status For Older Adults: Patient received any pneumococcal conjugate or polysaccharide vaccine on or after their 60th birthday and before the end of the measurement period
Detail Level: Detailed
Quality 110: Preventive Care And Screening: Influenza Immunization: Influenza Immunization Administered during Influenza season
more than 90 days ago

## 2024-02-28 NOTE — ED PROVIDER NOTE - CROS ED CONS ALL NEG
Last visit: 10/30/2023  Last Med refill: 12/06/2023  Does patient have enough medication for 72 hours: No:     Next Visit Date:  Future Appointments   Date Time Provider Department Center   3/27/2024  1:30 PM [facility]   4/29/2024  2:15 PM [facility]   5/14/2024 10:30 AM Trinidad Abdi DO [facility]       Health Maintenance   Topic Date Due    Hepatitis B vaccine (1 of 3 - 3-dose series) Never done    Pneumococcal 0-64 years Vaccine (1 - PCV) Never done    Diabetic retinal exam  Never done    Hepatitis C screen  Never done    COVID-19 Vaccine (4 - 2023-24 season) 09/01/2023    Depression Screen  02/20/2024    Flu vaccine (1) 10/30/2024 (Originally 8/1/2023)    A1C test (Diabetic or Prediabetic)  10/30/2024    Diabetic Alb to Cr ratio (uACR) test  10/30/2024    Lipids  11/09/2024    GFR test (Diabetes, CKD 3-4, OR last GFR 15-59)  11/09/2024    Colorectal Cancer Screen  12/22/2024    Diabetic foot exam  01/17/2025    DTaP/Tdap/Td vaccine (2 - Td or Tdap) 12/16/2027    HIV screen  Completed    Hepatitis A vaccine  Aged Out    Hib vaccine  Aged Out    Polio vaccine  Aged Out    Meningococcal (ACWY) vaccine  Aged Out       Hemoglobin A1C (%)   Date Value   10/30/2023 7.0   02/20/2023 6.8   08/02/2022 7.1             ( goal A1C is < 7)   No components found for: \"LABMICR\"  LDL Cholesterol (mg/dL)   Date Value   11/09/2023 51   08/02/2022 39       (goal LDL is <100)   AST (U/L)   Date Value   11/09/2023 22     ALT (U/L)   Date Value   11/09/2023 26     BUN (mg/dL)   Date Value   11/09/2023 10     BP Readings from Last 3 Encounters:   01/04/24 109/69   10/30/23 104/64   10/17/23 (!) 143/75          (goal 120/80)    All Future Testing planned in [facility]  Lab Frequency Next Occurrence   Cervical/Thoracic Epidural Steroid Injection Once 10/04/2023               Patient Active Problem List:     Hypertension     Rectal bleeding     GERD 
- - -

## 2024-06-25 NOTE — H&P ADULT - PROBLEM SELECTOR PROBLEM 4
Patient [facility] pharmacy called  States Estratest was taken off market.  Script was sent 6/19/24  They will run again  RN phoned  pharmacy for update.  Brand name has been discontinued in [place]  Generics are available (Covaryx)  RN phoned [facility].  She will follow up on this    Routed to Dr Gavin  Please advise patient is to be on Estrace and Estratest?                     COPD exacerbation

## 2024-12-02 NOTE — DISCHARGE NOTE ADULT - NURSING SECTION COMPLETE
NEGATIVE PPD or Quantiferon Gold:7/2024
MEDS PROVIDED BY [facility]
Lab obtained prior to infusion Patient/Caregiver provided printed discharge information.